# Patient Record
Sex: MALE | Race: WHITE | NOT HISPANIC OR LATINO | Employment: OTHER | URBAN - METROPOLITAN AREA
[De-identification: names, ages, dates, MRNs, and addresses within clinical notes are randomized per-mention and may not be internally consistent; named-entity substitution may affect disease eponyms.]

---

## 2018-01-17 ENCOUNTER — OFFICE VISIT CONVERTED (OUTPATIENT)
Dept: UROLOGY | Facility: CLINIC | Age: 58
End: 2018-01-17
Attending: UROLOGY

## 2019-01-23 ENCOUNTER — HOSPITAL ENCOUNTER (OUTPATIENT)
Dept: LAB | Facility: HOSPITAL | Age: 59
Discharge: HOME | End: 2019-01-23
Attending: UROLOGY

## 2019-01-23 ENCOUNTER — OFFICE VISIT CONVERTED (OUTPATIENT)
Dept: UROLOGY | Facility: CLINIC | Age: 59
End: 2019-01-23
Attending: UROLOGY

## 2019-01-23 LAB
BASOPHILS # BLD AUTO: 0.08 10*3/UL (ref 0–0.2)
BASOPHILS NFR BLD AUTO: 0.93 % (ref 0–3)
EOSINOPHIL # BLD AUTO: 0.14 10*3/UL (ref 0–0.7)
EOSINOPHIL # BLD AUTO: 1.64 % (ref 0–7)
ERYTHROCYTE [DISTWIDTH] IN BLOOD BY AUTOMATED COUNT: 12.9 % (ref 11.5–14.5)
HBA1C MFR BLD: 16.5 G/DL (ref 14–18)
HCT VFR BLD AUTO: 51.6 % (ref 42–52)
LYMPHOCYTES # BLD AUTO: 1.51 10*3/UL (ref 1–5)
MCH RBC QN AUTO: 28.9 PG (ref 27–31)
MCHC RBC AUTO-ENTMCNC: 31.9 G/DL (ref 33–37)
MCV RBC AUTO: 90.8 FL (ref 80–96)
MONOCYTES # BLD AUTO: 0.61 10*3/UL (ref 0.2–1.2)
MONOCYTES NFR BLD AUTO: 7.39 % (ref 3–10)
NEUTROPHILS # BLD AUTO: 5.95 10*3/UL (ref 2–8)
NEUTROPHILS NFR BLD AUTO: 71.8 % (ref 30–85)
NRBC BLD AUTO-RTO: 0 % (ref 0–0.01)
PLATELET # BLD AUTO: 167 10*3/UL (ref 130–400)
PMV BLD AUTO: 8.7 FL (ref 7.4–10.4)
RBC # BLD AUTO: 5.69 10*6/UL (ref 4.7–6.1)
VARIANT LYMPHS NFR BLD MANUAL: 18.2 % (ref 20–45)
WBC # BLD AUTO: 8.29 10*3/UL (ref 4.8–10.8)

## 2019-01-30 ENCOUNTER — OFFICE VISIT CONVERTED (OUTPATIENT)
Dept: FAMILY MEDICINE CLINIC | Facility: CLINIC | Age: 59
End: 2019-01-30
Attending: FAMILY MEDICINE

## 2019-05-27 ENCOUNTER — HOSPITAL ENCOUNTER (OUTPATIENT)
Dept: URGENT CARE | Facility: CLINIC | Age: 59
Discharge: HOME | End: 2019-05-27
Attending: PHYSICIAN ASSISTANT

## 2019-05-29 LAB — BACTERIA SPEC AEROBE CULT: NORMAL

## 2019-06-13 ENCOUNTER — OFFICE VISIT CONVERTED (OUTPATIENT)
Dept: FAMILY MEDICINE CLINIC | Facility: CLINIC | Age: 59
End: 2019-06-13
Attending: FAMILY MEDICINE

## 2019-11-22 ENCOUNTER — HOSPITAL ENCOUNTER (OUTPATIENT)
Dept: URGENT CARE | Facility: CLINIC | Age: 59
Discharge: HOME | End: 2019-11-22
Attending: EMERGENCY MEDICINE

## 2020-01-24 ENCOUNTER — HOSPITAL ENCOUNTER (OUTPATIENT)
Dept: LAB | Facility: HOSPITAL | Age: 60
Discharge: HOME | End: 2020-01-24
Attending: UROLOGY

## 2020-01-24 LAB
ALBUMIN SERPL-MCNC: 4.7 G/DL (ref 3.5–5)
ALBUMIN/GLOB SERPL: 1.7 {RATIO} (ref 1.4–2.6)
ALP SERPL-CCNC: 59 U/L (ref 56–155)
ALT SERPL-CCNC: 45 U/L (ref 10–40)
ANION GAP SERPL CALC-SCNC: 23 MMOL/L (ref 8–19)
AST SERPL-CCNC: 21 U/L (ref 15–50)
BASOPHILS # BLD AUTO: 0.05 10*3/UL (ref 0–0.2)
BASOPHILS NFR BLD AUTO: 0.7 % (ref 0–3)
BILIRUB SERPL-MCNC: 0.52 MG/DL (ref 0.2–1.3)
BUN SERPL-MCNC: 12 MG/DL (ref 5–25)
BUN/CREAT SERPL: 11 {RATIO} (ref 6–20)
CALCIUM SERPL-MCNC: 9.4 MG/DL (ref 8.7–10.4)
CHLORIDE SERPL-SCNC: 100 MMOL/L (ref 99–111)
CONV ABS IMM GRAN: 0.03 10*3/UL (ref 0–0.2)
CONV CO2: 22 MMOL/L (ref 22–32)
CONV IMMATURE GRAN: 0.4 % (ref 0–1.8)
CONV TOTAL PROTEIN: 7.5 G/DL (ref 6.3–8.2)
CREAT UR-MCNC: 1.05 MG/DL (ref 0.7–1.2)
DEPRECATED RDW RBC AUTO: 44.5 FL (ref 35.1–43.9)
EOSINOPHIL # BLD AUTO: 0.15 10*3/UL (ref 0–0.7)
EOSINOPHIL # BLD AUTO: 2.1 % (ref 0–7)
ERYTHROCYTE [DISTWIDTH] IN BLOOD BY AUTOMATED COUNT: 13.5 % (ref 11.6–14.4)
GFR SERPLBLD BASED ON 1.73 SQ M-ARVRAT: >60 ML/MIN/{1.73_M2}
GLOBULIN UR ELPH-MCNC: 2.8 G/DL (ref 2–3.5)
GLUCOSE SERPL-MCNC: 245 MG/DL (ref 70–99)
HCT VFR BLD AUTO: 49 % (ref 42–52)
HGB BLD-MCNC: 15.7 G/DL (ref 14–18)
LYMPHOCYTES # BLD AUTO: 1.75 10*3/UL (ref 1–5)
LYMPHOCYTES NFR BLD AUTO: 24.6 % (ref 20–45)
MCH RBC QN AUTO: 29.1 PG (ref 27–31)
MCHC RBC AUTO-ENTMCNC: 32 G/DL (ref 33–37)
MCV RBC AUTO: 90.9 FL (ref 80–96)
MONOCYTES # BLD AUTO: 0.63 10*3/UL (ref 0.2–1.2)
MONOCYTES NFR BLD AUTO: 8.8 % (ref 3–10)
NEUTROPHILS # BLD AUTO: 4.51 10*3/UL (ref 2–8)
NEUTROPHILS NFR BLD AUTO: 63.4 % (ref 30–85)
NRBC CBCN: 0 % (ref 0–0.7)
OSMOLALITY SERPL CALC.SUM OF ELEC: 298 MOSM/KG (ref 273–304)
PLATELET # BLD AUTO: 170 10*3/UL (ref 130–400)
PMV BLD AUTO: 11.3 FL (ref 9.4–12.4)
POTASSIUM SERPL-SCNC: 4.6 MMOL/L (ref 3.5–5.3)
PSA SERPL-MCNC: 0.41 NG/ML (ref 0–4)
RBC # BLD AUTO: 5.39 10*6/UL (ref 4.7–6.1)
SODIUM SERPL-SCNC: 140 MMOL/L (ref 135–147)
TESTOST SERPL-MCNC: 400 NG/DL (ref 193–740)
WBC # BLD AUTO: 7.12 10*3/UL (ref 4.8–10.8)

## 2020-01-29 ENCOUNTER — OFFICE VISIT CONVERTED (OUTPATIENT)
Dept: UROLOGY | Facility: CLINIC | Age: 60
End: 2020-01-29
Attending: UROLOGY

## 2020-01-29 ENCOUNTER — HOSPITAL ENCOUNTER (OUTPATIENT)
Dept: LAB | Facility: HOSPITAL | Age: 60
Discharge: HOME | End: 2020-01-29
Attending: UROLOGY

## 2020-01-29 LAB
AMPHETAMINES UR QL SCN: NEGATIVE
BARBITURATES UR QL SCN: NEGATIVE
BENZODIAZ UR QL SCN: NEGATIVE
CONV COCAINE, UR: NEGATIVE
METHADONE UR QL SCN: NEGATIVE
OPIATES TESTED UR SCN: NEGATIVE
OXYCODONE UR QL SCN: NEGATIVE
PCP UR QL: NEGATIVE
THC SERPLBLD CFM-MCNC: NEGATIVE NG/ML

## 2020-02-27 ENCOUNTER — CONVERSION ENCOUNTER (OUTPATIENT)
Dept: FAMILY MEDICINE CLINIC | Facility: CLINIC | Age: 60
End: 2020-02-27

## 2020-02-27 ENCOUNTER — OFFICE VISIT CONVERTED (OUTPATIENT)
Dept: FAMILY MEDICINE CLINIC | Facility: CLINIC | Age: 60
End: 2020-02-27
Attending: FAMILY MEDICINE

## 2020-04-24 ENCOUNTER — HOSPITAL ENCOUNTER (OUTPATIENT)
Dept: LAB | Facility: HOSPITAL | Age: 60
Discharge: HOME | End: 2020-04-24
Attending: INTERNAL MEDICINE

## 2020-04-24 LAB
ANION GAP SERPL CALC-SCNC: 24 MMOL/L (ref 8–19)
BUN SERPL-MCNC: 17 MG/DL (ref 5–25)
BUN/CREAT SERPL: 18 {RATIO} (ref 6–20)
CALCIUM SERPL-MCNC: 9.2 MG/DL (ref 8.7–10.4)
CHLORIDE SERPL-SCNC: 97 MMOL/L (ref 99–111)
CONV CO2: 19 MMOL/L (ref 22–32)
CREAT UR-MCNC: 0.96 MG/DL (ref 0.7–1.2)
GFR SERPLBLD BASED ON 1.73 SQ M-ARVRAT: >60 ML/MIN/{1.73_M2}
GLUCOSE SERPL-MCNC: 216 MG/DL (ref 70–99)
OSMOLALITY SERPL CALC.SUM OF ELEC: 288 MOSM/KG (ref 273–304)
POTASSIUM SERPL-SCNC: 4.5 MMOL/L (ref 3.5–5.3)
SODIUM SERPL-SCNC: 135 MMOL/L (ref 135–147)

## 2020-07-04 ENCOUNTER — HOSPITAL ENCOUNTER (OUTPATIENT)
Dept: URGENT CARE | Facility: CLINIC | Age: 60
Discharge: HOME | End: 2020-07-04
Attending: PHYSICIAN ASSISTANT

## 2020-07-08 LAB — SARS-COV-2 RNA SPEC QL NAA+PROBE: NOT DETECTED

## 2020-08-12 ENCOUNTER — HOSPITAL ENCOUNTER (OUTPATIENT)
Dept: URGENT CARE | Facility: CLINIC | Age: 60
Discharge: HOME | End: 2020-08-12
Attending: NURSE PRACTITIONER

## 2020-11-09 ENCOUNTER — HOSPITAL ENCOUNTER (OUTPATIENT)
Dept: LAB | Facility: HOSPITAL | Age: 60
Discharge: HOME | End: 2020-11-09
Attending: INTERNAL MEDICINE

## 2020-11-09 LAB
BASOPHILS # BLD AUTO: 0.07 10*3/UL (ref 0–0.2)
BASOPHILS NFR BLD AUTO: 0.8 % (ref 0–3)
CONV ABS IMM GRAN: 0.07 10*3/UL (ref 0–0.2)
CONV IMMATURE GRAN: 0.8 % (ref 0–1.8)
DEPRECATED RDW RBC AUTO: 44.6 FL (ref 35.1–43.9)
EOSINOPHIL # BLD AUTO: 0.17 10*3/UL (ref 0–0.7)
EOSINOPHIL # BLD AUTO: 1.8 % (ref 0–7)
ERYTHROCYTE [DISTWIDTH] IN BLOOD BY AUTOMATED COUNT: 13.8 % (ref 11.6–14.4)
HCT VFR BLD AUTO: 49.9 % (ref 42–52)
HGB BLD-MCNC: 16.3 G/DL (ref 14–18)
LYMPHOCYTES # BLD AUTO: 1.37 10*3/UL (ref 1–5)
LYMPHOCYTES NFR BLD AUTO: 14.9 % (ref 20–45)
MCH RBC QN AUTO: 28.9 PG (ref 27–31)
MCHC RBC AUTO-ENTMCNC: 32.7 G/DL (ref 33–37)
MCV RBC AUTO: 88.5 FL (ref 80–96)
MONOCYTES # BLD AUTO: 0.69 10*3/UL (ref 0.2–1.2)
MONOCYTES NFR BLD AUTO: 7.5 % (ref 3–10)
NEUTROPHILS # BLD AUTO: 6.84 10*3/UL (ref 2–8)
NEUTROPHILS NFR BLD AUTO: 74.2 % (ref 30–85)
NRBC CBCN: 0 % (ref 0–0.7)
PLATELET # BLD AUTO: 186 10*3/UL (ref 130–400)
PMV BLD AUTO: 11 FL (ref 9.4–12.4)
RBC # BLD AUTO: 5.64 10*6/UL (ref 4.7–6.1)
WBC # BLD AUTO: 9.21 10*3/UL (ref 4.8–10.8)

## 2021-01-19 ENCOUNTER — OFFICE VISIT CONVERTED (OUTPATIENT)
Dept: NEUROLOGY | Facility: CLINIC | Age: 61
End: 2021-01-19
Attending: PSYCHIATRY & NEUROLOGY

## 2021-01-19 ENCOUNTER — CONVERSION ENCOUNTER (OUTPATIENT)
Dept: OTHER | Facility: HOSPITAL | Age: 61
End: 2021-01-19

## 2021-01-21 ENCOUNTER — HOSPITAL ENCOUNTER (OUTPATIENT)
Dept: LAB | Facility: HOSPITAL | Age: 61
Discharge: HOME | End: 2021-01-21
Attending: UROLOGY

## 2021-01-21 LAB
ALBUMIN SERPL-MCNC: 4.6 G/DL (ref 3.5–5)
ALBUMIN/GLOB SERPL: 1.5 {RATIO} (ref 1.4–2.6)
ALP SERPL-CCNC: 65 U/L (ref 56–155)
ALT SERPL-CCNC: 37 U/L (ref 10–40)
AMPHETAMINES UR QL SCN: NEGATIVE
ANION GAP SERPL CALC-SCNC: 23 MMOL/L (ref 8–19)
AST SERPL-CCNC: 19 U/L (ref 15–50)
BARBITURATES UR QL SCN: NEGATIVE
BASOPHILS # BLD AUTO: 0.07 10*3/UL (ref 0–0.2)
BASOPHILS NFR BLD AUTO: 0.9 % (ref 0–3)
BENZODIAZ UR QL SCN: NEGATIVE
BILIRUB SERPL-MCNC: 0.57 MG/DL (ref 0.2–1.3)
BUN SERPL-MCNC: 17 MG/DL (ref 5–25)
BUN/CREAT SERPL: 15 {RATIO} (ref 6–20)
CALCIUM SERPL-MCNC: 9.7 MG/DL (ref 8.7–10.4)
CHLORIDE SERPL-SCNC: 99 MMOL/L (ref 99–111)
CONV ABS IMM GRAN: 0.05 10*3/UL (ref 0–0.2)
CONV CO2: 22 MMOL/L (ref 22–32)
CONV COCAINE, UR: NEGATIVE
CONV IMMATURE GRAN: 0.6 % (ref 0–1.8)
CONV TOTAL PROTEIN: 7.7 G/DL (ref 6.3–8.2)
CREAT UR-MCNC: 1.12 MG/DL (ref 0.7–1.2)
DEPRECATED RDW RBC AUTO: 45.1 FL (ref 35.1–43.9)
EOSINOPHIL # BLD AUTO: 0.2 10*3/UL (ref 0–0.7)
EOSINOPHIL # BLD AUTO: 2.5 % (ref 0–7)
ERYTHROCYTE [DISTWIDTH] IN BLOOD BY AUTOMATED COUNT: 13.8 % (ref 11.6–14.4)
GFR SERPLBLD BASED ON 1.73 SQ M-ARVRAT: >60 ML/MIN/{1.73_M2}
GLOBULIN UR ELPH-MCNC: 3.1 G/DL (ref 2–3.5)
GLUCOSE SERPL-MCNC: 211 MG/DL (ref 70–99)
HCT VFR BLD AUTO: 51.6 % (ref 42–52)
HGB BLD-MCNC: 16.8 G/DL (ref 14–18)
LYMPHOCYTES # BLD AUTO: 1.67 10*3/UL (ref 1–5)
LYMPHOCYTES NFR BLD AUTO: 21.1 % (ref 20–45)
MCH RBC QN AUTO: 29.3 PG (ref 27–31)
MCHC RBC AUTO-ENTMCNC: 32.6 G/DL (ref 33–37)
MCV RBC AUTO: 90.1 FL (ref 80–96)
METHADONE UR QL SCN: NEGATIVE
MONOCYTES # BLD AUTO: 0.83 10*3/UL (ref 0.2–1.2)
MONOCYTES NFR BLD AUTO: 10.5 % (ref 3–10)
NEUTROPHILS # BLD AUTO: 5.09 10*3/UL (ref 2–8)
NEUTROPHILS NFR BLD AUTO: 64.4 % (ref 30–85)
NRBC CBCN: 0 % (ref 0–0.7)
OPIATES TESTED UR SCN: NEGATIVE
OSMOLALITY SERPL CALC.SUM OF ELEC: 296 MOSM/KG (ref 273–304)
OXYCODONE UR QL SCN: NEGATIVE
PCP UR QL: NEGATIVE
PLATELET # BLD AUTO: 193 10*3/UL (ref 130–400)
PMV BLD AUTO: 11.4 FL (ref 9.4–12.4)
POTASSIUM SERPL-SCNC: 4.5 MMOL/L (ref 3.5–5.3)
PSA SERPL-MCNC: 0.44 NG/ML (ref 0–4)
RBC # BLD AUTO: 5.73 10*6/UL (ref 4.7–6.1)
SODIUM SERPL-SCNC: 139 MMOL/L (ref 135–147)
TESTOST SERPL-MCNC: 251 NG/DL (ref 193–740)
THC SERPLBLD CFM-MCNC: NEGATIVE NG/ML
WBC # BLD AUTO: 7.91 10*3/UL (ref 4.8–10.8)

## 2021-01-25 ENCOUNTER — OFFICE VISIT CONVERTED (OUTPATIENT)
Dept: ORTHOPEDIC SURGERY | Facility: CLINIC | Age: 61
End: 2021-01-25
Attending: ORTHOPAEDIC SURGERY

## 2021-01-25 ENCOUNTER — CONVERSION ENCOUNTER (OUTPATIENT)
Dept: ORTHOPEDIC SURGERY | Facility: CLINIC | Age: 61
End: 2021-01-25

## 2021-02-02 ENCOUNTER — TELEPHONE CONVERTED (OUTPATIENT)
Dept: UROLOGY | Facility: CLINIC | Age: 61
End: 2021-02-02
Attending: UROLOGY

## 2021-03-01 ENCOUNTER — HOSPITAL ENCOUNTER (OUTPATIENT)
Dept: LAB | Facility: HOSPITAL | Age: 61
Discharge: HOME | End: 2021-03-01
Attending: SPECIALIST

## 2021-03-01 LAB — TSH SERPL-ACNC: 1.63 M[IU]/L (ref 0.27–4.2)

## 2021-03-10 ENCOUNTER — OFFICE VISIT CONVERTED (OUTPATIENT)
Dept: ORTHOPEDIC SURGERY | Facility: CLINIC | Age: 61
End: 2021-03-10
Attending: ORTHOPAEDIC SURGERY

## 2021-04-14 ENCOUNTER — OFFICE VISIT CONVERTED (OUTPATIENT)
Dept: ORTHOPEDIC SURGERY | Facility: CLINIC | Age: 61
End: 2021-04-14
Attending: ORTHOPAEDIC SURGERY

## 2021-05-10 NOTE — H&P
History and Physical      Patient Name: Osman Holman   Patient ID: [id number]   Sex: Male   YOB: 1960    Primary Care Provider: Carlos A Gibson III MD   Referring Provider: Jo Ann Darby MD    Visit Date: January 25, 2021    Provider: Ab Copeland MD   Location: [facility]   Location Address: [address] [place] [address]  [id number]   Location Phone: [phone]          Chief Complaint  · Bilateral hand pain       History Of Present Illness  Osman Holman is a 61 year old /White male who presents today to [facility].      Patient presents today for an evaluation of bilateral hand pain. Patient complains of numbness and tingling for the last 3 months. He states that the left is worse than the right. He states with certain movements he has pain that shoots up to his elbow. Patient didn't need carpal tunnel braces at first but now he has to sleep with them every night.       Past Medical History  Allergic rhinitis; Aortic stenosis; Arthritis; Diabetes; Diabetes mellitus type 2 with complications, uncontrolled; ED (erectile dysfunction); Essential hypertension; GERD (gastroesophageal reflux disease); Heart Murmur; Hyperlipemia; Hyperlipidemia; Hypertension; Hypogonadism in male; Hypothyroidism; Nose irritation; Nostril sore; Thyroid disorder; Vitamin D Deficiency         Past Surgical History  Cholecstectomy; Colonoscopy; Gallbladder         Medication List  amlodipine 5 mg oral tablet; cetirizine 10 mg oral tablet; cyclobenzaprine 10 mg oral tablet; Flonase Sensimist 27.5 mcg/actuation nasal spray,suspension; glipizide 10 mg oral tablet; Janumet XR 50-1,000 mg oral tablet, ER multiphase 24 hr; levothyroxine 50 mcg oral tablet; omeprazole 20 mg oral capsule,delayed release(DR/EC); sildenafil 100 mg oral tablet; testosterone cypionate 200 mg/mL intramuscular oil         Allergy List  PENICILLINS       Allergies Reconciled  Family Medical History  Stroke; Diabetes,  "unspecified type; Hypertension; Diabetes; Family history of Arthritis         Social History  Alcohol (Light); Alcohol Use (Current some day); Claustophobic (Unknown); lives with spouse; .; Recreational Drug Use (Never); Retired.; Tobacco (Former)         Review of Systems  · Constitutional  o Denies  o : fever, chills, weight loss  · Cardiovascular  o Denies  o : chest pain, shortness of breath  · Gastrointestinal  o Denies  o : liver disease, heartburn, nausea, blood in stools  · Genitourinary  o Denies  o : painful urination, blood in urine  · Integument  o Denies  o : rash, itching  · Neurologic  o Denies  o : headache, weakness, loss of consciousness  · Musculoskeletal  o Denies  o : painful, swollen joints  · Psychiatric  o Denies  o : drug/alcohol addiction, anxiety, depression      Vitals  Date Time BP Position Site L\R Cuff Size HR RR TEMP (F) WT  HT  BMI kg/m2 BSA m2 O2 Sat FR L/min FiO2        01/25/2021 04:06 PM      97 - R   204lbs 2oz 5'  6\" 32.95 2.08 97 %            Physical Examination  · Constitutional  o Appearance  o : well developed, well-nourished, no obvious deformities present  · Head and Face  o Head  o :   § Inspection  § : normocephalic  o Face  o :   § Inspection  § : no facial lesions  · Eyes  o Conjunctivae  o : conjunctivae normal  o Sclerae  o : sclerae white  · Ears, Nose, Mouth and Throat  o Ears  o :   § External Ears  § : appearance within normal limits  § Hearing  § : intact  o Nose  o :   § External Nose  § : appearance normal  · Neck  o Inspection/Palpation  o : normal appearance  o Range of Motion  o : full range of motion  · Respiratory  o Respiratory Effort  o : breathing unlabored  o Inspection of Chest  o : normal appearance  o Auscultation of Lungs  o : no audible wheezing or rales  · Cardiovascular  o Heart  o : regular rate  · Gastrointestinal  o Abdominal Examination  o : soft and non-tender  · Skin and Subcutaneous Tissue  o General Inspection  o : intact, no " rashes  · Psychiatric  o General  o : Alert and oriented x3  o Judgement and Insight  o : judgment and insight intact  o Mood and Affect  o : mood normal, affect appropriate  · Extremities  o Extremities  o : BILATERAL WRISTS: Sensation grossly intact. Neurovascular intact. Skin intact. No swelling, skin discoloration or atrophy. Positive Tinels. Patient able to wiggle fingers and make a fist. Full wrist extension, full wrist flexion, full , full thumb opposition, full PIP flexors, full DIP flexors, full PIP extensors, full finger adduction, full finger abduction. Radial pulse 2+, ulnar pulse 2+.   · Injection Note/Aspiration Note  o Site  o : Bilateral, Wrist  o Procedure  o : Procedure: After educating the patient, patient gave consent for procedure. After using Chloraprep, the joint space was injected. The patient tolerated the procedure well.   o Medication  o : 80 mg of DepoMedrol with 1cc of 1% Lidocaine  · Imaging  o Imaging  o : 1/19/21 EMG: Bilateral carpal tunnel syndrome. Mild to moderate on the left and mild on the right.           Assessment  · Bilateral carpal tunnel syndrome     354.0/G56.03  · Arthralgia of both hands       Pain in joints of right hand     719.44/M25.541  Pain in joints of left hand     719.44/M25.542      Plan  · Orders  o 2 - Depo-Medrol injection 80mg () - - 01/25/2021   Lot [id number] Exp 01 2022 [employer] Administered by ALEX Copeland MD  o 2 - Carpal Tunnel (Injection) (46077) - - 01/25/2021   Lot [id number] Exp 03 01 2022 [employer] Administered by ALEX Copeland MD  · Medications  o Medications have been Reconciled  o Transition of Care or Provider Policy  · Instructions  o Dr. Copeland saw and examined the patient and agrees with plan.   o EMG reviewed by Dr. Copeland.  o Reviewed the patient's Past Medical, Social, and Family history as well as the ROS at today's visit, no changes.  o Call or return if worsening symptoms.  o Follow up in 6 weeks.  o This note was transcribed by  Mary Emerson. gabe  o Discussed diagnosis and treatment options with the patient. Patient opted for bilateral carpal tunnel injections.            Electronically Signed by: Mary Emerson-, Other -Author on January 28, 2021 10:38:32 AM  Electronically Co-signed by: Ab Copeland MD -Reviewer on January 28, 2021 10:18:52 PM

## 2021-05-14 VITALS
HEART RATE: 75 BPM | HEIGHT: 66 IN | TEMPERATURE: 97.4 F | BODY MASS INDEX: 32.47 KG/M2 | RESPIRATION RATE: 18 BRPM | WEIGHT: 202 LBS | SYSTOLIC BLOOD PRESSURE: 140 MMHG | DIASTOLIC BLOOD PRESSURE: 72 MMHG

## 2021-05-14 VITALS — OXYGEN SATURATION: 96 % | HEART RATE: 90 BPM | HEIGHT: 66 IN | WEIGHT: 211 LBS | BODY MASS INDEX: 33.91 KG/M2

## 2021-05-14 VITALS — WEIGHT: 204.12 LBS | BODY MASS INDEX: 32.81 KG/M2 | HEIGHT: 66 IN | HEART RATE: 97 BPM | OXYGEN SATURATION: 97 %

## 2021-05-14 VITALS — BODY MASS INDEX: 33.16 KG/M2 | WEIGHT: 206.31 LBS | HEIGHT: 66 IN

## 2021-05-14 NOTE — PROGRESS NOTES
Progress Note      Patient Name: Osman Holman   Patient ID: [id number]   Sex: Male   YOB: 1960    Primary Care Provider: Carlos A Gibson III MD   Referring Provider: Jo Ann Darby MD    Visit Date: March 10, 2021    Provider: Ab Copeland MD   Location: [facility]   Location Address: [address] [place] [address]  [id number]   Location Phone: [phone]          Chief Complaint  · Bilateral hand pain      History Of Present Illness  Osman Holman is a 61 year old /White male who presents today to [facility].      Patient presents today for a follow-up of bilateral carpal tunnel syndrome. Patient complains of numbness and tingling for the last 3 months. He states that the left is worse than the right. He states with certain movements he has pain that shoots up to his elbow. Patient didn't need carpal tunnel braces at first but now he has to sleep with them every night. Patient received an injection on 1/25/21 that gave him some relief. He states that he is in-between his COVID vaccinations right now. He is currently taking care of his wife and wants to hold off on surgical intervention at this time. He states thanks to the injection he has better strength in his hands. He states he is still experiencing numbness and tingling in his hands but it is a bit more tolerable.      Patient complains of pain in his calf muscle in his right leg. He states he was leaning forward in his wife's car and felt a pop in his calf. He denies any swelling or bruising afterwards. He states that he has been resting his calf muscle and it has improved some. He states stretching his calf gives him some relief.          Past Medical History  Allergic rhinitis; Aortic stenosis; Arthritis; Diabetes; Diabetes mellitus type 2 with complications, uncontrolled; ED (erectile dysfunction); Essential hypertension; GERD (gastroesophageal reflux disease); Heart Murmur; Hyperlipemia;  "Hyperlipidemia; Hypertension; Hypogonadism in male; Hypothyroidism; Nose irritation; Nostril sore; Thyroid disorder; Vitamin D Deficiency         Past Surgical History  Cholecstectomy; Colonoscopy; Gallbladder         Medication List  amlodipine 5 mg oral tablet; cetirizine 10 mg oral tablet; cyclobenzaprine 10 mg oral tablet; Flonase Sensimist 27.5 mcg/actuation nasal spray,suspension; glipizide 10 mg oral tablet; Janumet XR 50-1,000 mg oral tablet, ER multiphase 24 hr; levothyroxine 50 mcg oral tablet; omeprazole 20 mg oral capsule,delayed release(DR/EC); sildenafil 100 mg oral tablet; testosterone cypionate 200 mg/mL intramuscular oil         Allergy List  PENICILLINS       Allergies Reconciled  Family Medical History  Stroke; Diabetes, unspecified type; Hypertension; Diabetes; Family history of Arthritis         Social History  Alcohol (Light); Alcohol Use; Claustophobic (Unknown); lives with spouse; .; Recreational Drug Use (Never); Retired.; Tobacco (Former)         Review of Systems  · Constitutional  o Denies  o : fever, chills, weight loss  · Cardiovascular  o Denies  o : chest pain, shortness of breath  · Gastrointestinal  o Denies  o : liver disease, heartburn, nausea, blood in stools  · Genitourinary  o Denies  o : painful urination, blood in urine  · Integument  o Denies  o : rash, itching  · Neurologic  o Denies  o : headache, weakness, loss of consciousness  · Musculoskeletal  o Denies  o : painful, swollen joints  · Psychiatric  o Denies  o : drug/alcohol addiction, anxiety, depression      Vitals  Date Time BP Position Site L\R Cuff Size HR RR TEMP (F) WT  HT  BMI kg/m2 BSA m2 O2 Sat FR L/min FiO2        03/10/2021 01:23 PM         206lbs 5oz 5'  6\" 33.3 2.09             Physical Examination  · Constitutional  o Appearance  o : well developed, well-nourished, no obvious deformities present  · Head and Face  o Head  o :   § Inspection  § : normocephalic  o Face  o :   § Inspection  § : no " facial lesions  · Eyes  o Conjunctivae  o : conjunctivae normal  o Sclerae  o : sclerae white  · Ears, Nose, Mouth and Throat  o Ears  o :   § External Ears  § : appearance within normal limits  § Hearing  § : intact  o Nose  o :   § External Nose  § : appearance normal  · Neck  o Inspection/Palpation  o : normal appearance  o Range of Motion  o : full range of motion  · Respiratory  o Respiratory Effort  o : breathing unlabored  o Inspection of Chest  o : normal appearance  o Auscultation of Lungs  o : no audible wheezing or rales  · Cardiovascular  o Heart  o : regular rate  · Gastrointestinal  o Abdominal Examination  o : soft and non-tender  · Skin and Subcutaneous Tissue  o General Inspection  o : intact, no rashes  · Psychiatric  o General  o : Alert and oriented x3  o Judgement and Insight  o : judgment and insight intact  o Mood and Affect  o : mood normal, affect appropriate  · Right Hand  o Inspection  o : No swelling, atrophy, and skin discoloration. Skin intact. Full ROM. Patient able to wiggle fingers and make a fist. Full wrist extension, full wrist flexion, full , full thumb opposition, full PIP flexors, full DIP flexors, full PIP extensors, full finger adduction, full finger abduction. Radial pulse 2+, ulnar pulse 2+. Positive median nerve compression.   · Left Hand  o Inspection  o : No swelling, atrophy, and skin discoloration. Skin intact. Full ROM. Patient able to wiggle fingers and make a fist. Full wrist extension, full wrist flexion, full , full thumb opposition, full PIP flexors, full DIP flexors, full PIP extensors, full finger adduction, full finger abduction. Radial pulse 2+, ulnar pulse 2+. Positive median nerve compression.   · Right Lower Extremity  o Inspection  o : Stable to valgus/varus stress. Good strength in quadriceps, hamstrings, dorsiflexors, and plantar flexors. Sensation grossly intact. Neurovascular intact. No swelling, skin discoloration or atrophy. Full weight  bearing. Non-antalgic gait. Calf supple, non-tender. Full flexion and extension. Negative ballotable effusion , negtive fluid wave, negative patellar compression, negative patellar apprehension, positive Eliel's test, positive Apley's test, negative anterior drawer, negative posterior drawer, negative lachman's drawer.   · Imaging  o Imaging  o : 1/19/21 EMG: Bilateral carpal tunnel syndrome. Mild to moderate on the left and mild on the right.          Assessment  · Bilateral carpal tunnel syndrome     354.0/G56.03  · Arthralgia of both hands       Pain in joints of right hand     719.44/M25.541  Pain in joints of left hand     719.44/M25.542  · Calf strain, right     729.5/M79.669      Plan  · Medications  o Medications have been Reconciled  o Transition of Care or Provider Policy  · Instructions  o Dr. Copeland saw and examined the patient and agrees with plan.   o EMG reviewed by Dr. Copeland.  o Reviewed the patient's Past Medical, Social, and Family history as well as the ROS at today's visit, no changes.  o Call or return if worsening symptoms.  o Exercise handout given.  o Follow Up PRN.  o The above service was scribed by Mary Emerson on my behalf and I attest to the accuracy of the note. gabe  o Discussed treatment plans with the patient. He states that he is in-between his COVID vaccinations right now. He is currently taking care of his wife and wants to hold off on surgical intervention at this time. He wishes to proceed with conservative management. Patient given some at home exercises for calf strains. Patient given a prescription for physical therapy if he wishes to attend.            Electronically Signed by: Mary Emerson-, Other -Author on March 11, 2021 11:18:21 AM  Electronically Co-signed by: Ab Copeland MD -Reviewer on March 14, 2021 05:40:41 PM

## 2021-05-14 NOTE — PROGRESS NOTES
"   Progress Note      Patient Name: Osman Holman   Patient ID: [id number]   Sex: Male   YOB: 1960    Primary Care Provider: Carlos A Gibson III MD   Referring Provider: Jo Ann Darby MD    Visit Date: February 2, 2021    Provider: Chuck Reese MD   Location: [facility]   Location Address: [address] [place] [address]  [id number]   Location Phone: [phone]          Chief Complaint  · urological issues      History Of Present Illness  TELEHEALTH TELEPHONE VISIT  Osman Holman is a 61 year old /White male who is presenting for evaluation via telehealth telephone visit. Verbal consent obtained before beginning visit.   Provider spent 12 minutes with the patient during the telehealth visit.   The following staff were present during this visit: CULLEN Frankel   Past Medical History/ Overview of Patient Symptoms     58, H/H 16/51    Total testosterone    1/21    251  1/20    400  11/18  393  11/17  504      Previous    Patient's main complaint initially was decreased energy and decreased libido.    No side effects or problems with his injections.    Voiding well. no gross hematuria,No history of kidney stone.    No urologic family history,   Has never had any urologic surgery.    No cardiopulmonary history.  Patient does not smoke.  Patient does not use blood thinner.    PSA    1/21     0.44  1/20     0.41  11/18   0.48  11/17   0.46  11/13   0.47\">61-year-old  gentleman who is on testosterone therapy for hypogonadism.    Does not want a NP for rectal exam.    Energy level and libido ok     Was on Depo testosterone testosterone injections 200 mg IM every 2 weeks.  Patient is followed by endocrinology secondary to his diabetes.  She found that his H&H was elevated and had him decrease his testosterone down to 175 mg (0.8 ml) every 2 weeks.  He has been doing okay on this and on repeat H/H he is back within normal limits.  No real changes to his fatigue or " libido.      He is doing ok on this new dose.    f/u Today with labs    1/21 creatinine 1.1, > 60, H/H 16/51    Total testosterone    1/21    251  1/20    400  11/18  393  11/17  504      Previous    Patient's main complaint initially was decreased energy and decreased libido.    No side effects or problems with his injections.    Voiding well. no gross hematuria,No history of kidney stone.    No urologic family history,   Has never had any urologic surgery.    No cardiopulmonary history.  Patient does not smoke.  Patient does not use blood thinner.    PSA    1/21     0.44  1/20     0.41  11/18   0.48  11/17   0.46  11/13   0.47       Past Medical History  Allergic rhinitis; Aortic stenosis; Arthritis; Diabetes; Diabetes mellitus type 2 with complications, uncontrolled; ED (erectile dysfunction); Essential hypertension; GERD (gastroesophageal reflux disease); Heart Murmur; Hyperlipemia; Hyperlipidemia; Hypertension; Hypogonadism in male; Hypothyroidism; Nose irritation; Nostril sore; Thyroid disorder; Vitamin D Deficiency         Past Surgical History  Cholecstectomy; Colonoscopy; Gallbladder         Medication List  amlodipine 5 mg oral tablet; cetirizine 10 mg oral tablet; cyclobenzaprine 10 mg oral tablet; Flonase Sensimist 27.5 mcg/actuation nasal spray,suspension; glipizide 10 mg oral tablet; Janumet XR 50-1,000 mg oral tablet, ER multiphase 24 hr; levothyroxine 50 mcg oral tablet; omeprazole 20 mg oral capsule,delayed release(DR/EC); sildenafil 100 mg oral tablet; testosterone cypionate 200 mg/mL intramuscular oil         Allergy List  PENICILLINS         Family Medical History  Stroke; Diabetes, unspecified type; Hypertension; Diabetes; Family history of Arthritis         Social History  Alcohol (Light); Alcohol Use; Claustophobic (Unknown); lives with spouse; .; Recreational Drug Use (Never); Retired.; Tobacco (Former)         Review of Systems  · Constitutional  o Denies  o : fatigue, night  sweats  · Eyes  o Denies  o : double vision, blurred vision  · HENT  o Denies  o : vertigo, recent head injury  · Breasts  o Denies  o : abnormal changes in breast size, additional breast symptoms except as noted in the HPI  · Cardiovascular  o Denies  o : chest pain, irregular heart beats  · Respiratory  o Denies  o : shortness of breath, productive cough  · Gastrointestinal  o Denies  o : nausea, vomiting  · Genitourinary  o Denies  o : dysuria, urinary retention  · Integument  o Denies  o : hair growth change, new skin lesions  · Neurologic  o Denies  o : altered mental status, seizures  · Musculoskeletal  o Denies  o : joint swelling, limitation of motion  · Endocrine  o Denies  o : cold intolerance, heat intolerance  · Heme-Lymph  o Denies  o : petechiae, lymph node enlargement or tenderness  · Allergic-Immunologic  o Denies  o : frequent illnesses              Assessment  · Hypogonadism     257.2  · Prostate cancer screening     V76.44/Z12.5  · Therapeutic drug monitoring     V58.83/Z51.81      Plan  · Orders  o CBC with Auto Diff [facility] (11033) - 257.2, V58.83/Z51.81 - 08/02/2021  o CMP [facility] (31190) - 257.2, V58.83/Z51.81 - 08/02/2021  o Physician Telephone Evaluation, 11-20 minutes (29548) - 257.2, V76.44/Z12.5, V58.83/Z51.81 - 02/02/2021  o Total testosterone (61108) - 257.2, V58.83/Z51.81 - 08/02/2021  o Urine Drug Screen ([facility]) (30385) - 257.2, V58.83/Z51.81 - 08/02/2021  · Medications  o Medications have been Reconciled  o Transition of Care or Provider Policy  · Instructions  o Plan Of Care:   o Electronically Identified Patient Education Materials Provided Electronically       Patient will continue Depo-Testosterone 175 mg every 2 weeks IM at home.    Patient is using 1 mL vials and wasting the rest    Follow-up in 6 months with a CMP, CBC and total testosterone             Electronically Signed by: Chuck Reese MD -Author on February 2, 2021 09:04:10 AM

## 2021-05-14 NOTE — PROGRESS NOTES
Progress Note      Patient Name: Osman Holman   Patient ID: [id number]   Sex: Male   YOB: 1960    Primary Care Provider: Carlos A Gibson III MD   Referring Provider: Jo Ann Darby MD    Visit Date: April 14, 2021    Provider: Ab Copeland MD   Location: [facility]   Location Address: [address] [place] [address]  [id number]   Location Phone: [phone]          Chief Complaint  · Bilateral Carpal Tunnel Syndrome      History Of Present Illness  Osman Holman is a 61 year old /White male who presents today to [facility].      Patient presents today for a follow-up of bilateral carpal tunnel syndrome. Patient complains of numbness and tingling for the last 3 months. He states that the left is worse than the right. He states with certain movements he has pain that shoots up to his elbow. Patient didn't need carpal tunnel braces at first but now he has to sleep with them every night. Patient received an injection on 1/25/21 that gave him some relief. He is currently taking care of his wife and wants to hold off on surgical intervention at this time. He states thanks to the injection he has better strength in his hands. He states he is still experiencing numbness and tingling in his hands but it is a bit more tolerable. Patient presents today for bilateral carpal tunnel injections.       Past Medical History  Allergic rhinitis; Aortic stenosis; Arthritis; Diabetes; Diabetes mellitus type 2 with complications, uncontrolled; ED (erectile dysfunction); Essential hypertension; GERD (gastroesophageal reflux disease); Heart Murmur; Hyperlipemia; Hyperlipidemia; Hypertension; Hypogonadism in male; Hypothyroidism; Nose irritation; Nostril sore; Thyroid disorder; Vitamin D Deficiency         Past Surgical History  Cholecstectomy; Colonoscopy; Gallbladder         Medication List  amlodipine 5 mg oral tablet; cetirizine 10 mg oral tablet; cyclobenzaprine 10 mg oral tablet;  "Flonase Sensimist 27.5 mcg/actuation nasal spray,suspension; glipizide 10 mg oral tablet; Janumet XR 50-1,000 mg oral tablet, ER multiphase 24 hr; levothyroxine 50 mcg oral tablet; omeprazole 20 mg oral capsule,delayed release(DR/EC); sildenafil 100 mg oral tablet; testosterone cypionate 200 mg/mL intramuscular oil         Allergy List  PENICILLINS       Allergies Reconciled  Family Medical History  Stroke; Diabetes, unspecified type; Hypertension; Diabetes; Family history of Arthritis         Social History  Alcohol (Light); Alcohol Use; Claustophobic (Unknown); lives with spouse; .; Recreational Drug Use (Never); Retired.; Tobacco (Former)         Review of Systems  · Constitutional  o Denies  o : fever, chills, weight loss  · Cardiovascular  o Denies  o : chest pain, shortness of breath  · Gastrointestinal  o Denies  o : liver disease, heartburn, nausea, blood in stools  · Genitourinary  o Denies  o : painful urination, blood in urine  · Integument  o Denies  o : rash, itching  · Neurologic  o Denies  o : headache, weakness, loss of consciousness  · Musculoskeletal  o Denies  o : painful, swollen joints  · Psychiatric  o Denies  o : drug/alcohol addiction, anxiety, depression      Vitals  Date Time BP Position Site L\R Cuff Size HR RR TEMP (F) WT  HT  BMI kg/m2 BSA m2 O2 Sat FR L/min FiO2        04/14/2021 10:27 AM      90 - R   211lbs 0oz 5'  6\" 34.06 2.11 96 %            Physical Examination  · Constitutional  o Appearance  o : well developed, well-nourished, no obvious deformities present  · Head and Face  o Head  o :   § Inspection  § : normocephalic  o Face  o :   § Inspection  § : no facial lesions  · Eyes  o Conjunctivae  o : conjunctivae normal  o Sclerae  o : sclerae white  · Ears, Nose, Mouth and Throat  o Ears  o :   § External Ears  § : appearance within normal limits  § Hearing  § : intact  o Nose  o :   § External Nose  § : appearance normal  · Neck  o Inspection/Palpation  o : normal " appearance  o Range of Motion  o : full range of motion  · Respiratory  o Respiratory Effort  o : breathing unlabored  o Inspection of Chest  o : normal appearance  o Auscultation of Lungs  o : no audible wheezing or rales  · Cardiovascular  o Heart  o : regular rate  · Gastrointestinal  o Abdominal Examination  o : soft and non-tender  · Skin and Subcutaneous Tissue  o General Inspection  o : intact, no rashes  · Psychiatric  o General  o : Alert and oriented x3  o Judgement and Insight  o : judgment and insight intact  o Mood and Affect  o : mood normal, affect appropriate  · Extremities  o Extremities  o : BILATERAL HANDS: Neurovascular intact. Sensation grossly intact. No swelling, atrophy, and skin discoloration. Skin intact. Full ROM. Patient able to wiggle fingers and make a fist. Full wrist extension, full wrist flexion, full , full thumb opposition, full PIP flexors, full DIP flexors, full PIP extensors, full finger adduction, full finger abduction. Radial pulse 2+, ulnar pulse 2+. Positive Phalen's. Positive median nerve compression. Positive Tinel's.   · Injection Note/Aspiration Note  o Site  o : Bilateral Carpal Tunnel  o Procedure  o : Procedure: After educating the patient, patient gave consent for procedure. After using Chloraprep, the joint space was injected. The patient tolerated the procedure well.   o Medication  o : 80 mg of DepoMedrol with 1cc of 1% Lidocaine          Assessment  · Bilateral carpal tunnel syndrome     354.0/G56.03      Plan  · Orders  o 2 - Carpal Tunnel (Injection) (41781) - - 04/14/2021   Lot [id number] Exp 04 2024 [employer] Administered by ALEX Copeland MD  o 2 - Depo-Medrol 80 () - - 04/14/2021   Lot [id number] Exp 02 2022 [employer] Administered by ALEX Copeland MD  · Medications  o Medications have been Reconciled  o Transition of Care or Provider Policy  · Instructions  o Dr. Copeland saw and examined the patient and agrees with plan.   o Reviewed the patient's Past  Medical, Social, and Family history as well as the ROS at today's visit, no changes.  o Call or return if worsening symptoms.  o Follow Up PRN.  o Discussed treatment plans with the patient. Patient received bilateral carpal tunnel injections and tolerated this procedure well.   o The above service was scribed by Mary Emerson on my behalf and I attest to the accuracy of the note. [email or web address]            Electronically Signed by: Mary Emerson-, Other -Author on April 15, 2021 01:51:11 PM  Electronically Co-signed by: Ab Copeland MD -Reviewer on April 18, 2021 07:40:33 PM

## 2021-05-15 VITALS
SYSTOLIC BLOOD PRESSURE: 140 MMHG | DIASTOLIC BLOOD PRESSURE: 74 MMHG | HEIGHT: 66 IN | BODY MASS INDEX: 32.78 KG/M2 | WEIGHT: 204 LBS

## 2021-05-15 VITALS
SYSTOLIC BLOOD PRESSURE: 144 MMHG | DIASTOLIC BLOOD PRESSURE: 82 MMHG | WEIGHT: 206 LBS | BODY MASS INDEX: 33.11 KG/M2 | HEIGHT: 66 IN

## 2021-05-16 VITALS
HEART RATE: 97 BPM | TEMPERATURE: 99.6 F | DIASTOLIC BLOOD PRESSURE: 70 MMHG | HEIGHT: 66 IN | WEIGHT: 209 LBS | SYSTOLIC BLOOD PRESSURE: 140 MMHG | BODY MASS INDEX: 33.59 KG/M2 | OXYGEN SATURATION: 96 %

## 2021-05-16 VITALS — BODY MASS INDEX: 33.27 KG/M2 | RESPIRATION RATE: 12 BRPM | HEIGHT: 66 IN | WEIGHT: 207 LBS

## 2021-05-16 VITALS — WEIGHT: 207 LBS | HEIGHT: 66 IN | RESPIRATION RATE: 14 BRPM | BODY MASS INDEX: 33.27 KG/M2

## 2021-07-12 ENCOUNTER — LAB (OUTPATIENT)
Dept: LAB | Facility: HOSPITAL | Age: 61
End: 2021-07-12

## 2021-07-12 ENCOUNTER — TRANSCRIBE ORDERS (OUTPATIENT)
Dept: ADMINISTRATIVE | Facility: HOSPITAL | Age: 61
End: 2021-07-12

## 2021-07-12 DIAGNOSIS — E29.1 HYPOGONADISM IN MALE: Primary | ICD-10-CM

## 2021-07-12 DIAGNOSIS — Z51.81 ENCOUNTER FOR THERAPEUTIC DRUG MONITORING: ICD-10-CM

## 2021-07-12 DIAGNOSIS — E29.1 HYPOGONADISM IN MALE: ICD-10-CM

## 2021-07-12 LAB
ALBUMIN SERPL-MCNC: 4.6 G/DL (ref 3.5–5.2)
ALBUMIN/GLOB SERPL: 1.8 G/DL
ALP SERPL-CCNC: 53 U/L (ref 39–117)
ALT SERPL W P-5'-P-CCNC: 27 U/L (ref 1–41)
AMPHET+METHAMPHET UR QL: NEGATIVE
ANION GAP SERPL CALCULATED.3IONS-SCNC: 10.5 MMOL/L (ref 5–15)
AST SERPL-CCNC: 14 U/L (ref 1–40)
BARBITURATES UR QL SCN: NEGATIVE
BASOPHILS # BLD AUTO: 0.06 10*3/MM3 (ref 0–0.2)
BASOPHILS NFR BLD AUTO: 0.7 % (ref 0–1.5)
BENZODIAZ UR QL SCN: NEGATIVE
BILIRUB SERPL-MCNC: 0.6 MG/DL (ref 0–1.2)
BUN SERPL-MCNC: 20 MG/DL (ref 8–23)
BUN/CREAT SERPL: 24.1 (ref 7–25)
CALCIUM SPEC-SCNC: 8.8 MG/DL (ref 8.6–10.5)
CANNABINOIDS SERPL QL: NEGATIVE
CHLORIDE SERPL-SCNC: 100 MMOL/L (ref 98–107)
CO2 SERPL-SCNC: 21.5 MMOL/L (ref 22–29)
COCAINE UR QL: NEGATIVE
CREAT SERPL-MCNC: 0.83 MG/DL (ref 0.76–1.27)
DEPRECATED RDW RBC AUTO: 43.2 FL (ref 37–54)
EOSINOPHIL # BLD AUTO: 0.18 10*3/MM3 (ref 0–0.4)
EOSINOPHIL NFR BLD AUTO: 2.1 % (ref 0.3–6.2)
ERYTHROCYTE [DISTWIDTH] IN BLOOD BY AUTOMATED COUNT: 13.5 % (ref 12.3–15.4)
GFR SERPL CREATININE-BSD FRML MDRD: 94 ML/MIN/1.73
GLOBULIN UR ELPH-MCNC: 2.6 GM/DL
GLUCOSE SERPL-MCNC: 192 MG/DL (ref 65–99)
HCT VFR BLD AUTO: 49.6 % (ref 37.5–51)
HGB BLD-MCNC: 16.7 G/DL (ref 13–17.7)
IMM GRANULOCYTES # BLD AUTO: 0.05 10*3/MM3 (ref 0–0.05)
IMM GRANULOCYTES NFR BLD AUTO: 0.6 % (ref 0–0.5)
LYMPHOCYTES # BLD AUTO: 1.5 10*3/MM3 (ref 0.7–3.1)
LYMPHOCYTES NFR BLD AUTO: 17.9 % (ref 19.6–45.3)
MCH RBC QN AUTO: 29.7 PG (ref 26.6–33)
MCHC RBC AUTO-ENTMCNC: 33.7 G/DL (ref 31.5–35.7)
MCV RBC AUTO: 88.1 FL (ref 79–97)
METHADONE UR QL SCN: NEGATIVE
MONOCYTES # BLD AUTO: 0.73 10*3/MM3 (ref 0.1–0.9)
MONOCYTES NFR BLD AUTO: 8.7 % (ref 5–12)
NEUTROPHILS NFR BLD AUTO: 5.88 10*3/MM3 (ref 1.7–7)
NEUTROPHILS NFR BLD AUTO: 70 % (ref 42.7–76)
NRBC BLD AUTO-RTO: 0 /100 WBC (ref 0–0.2)
OPIATES UR QL: NEGATIVE
OXYCODONE UR QL SCN: NEGATIVE
PLATELET # BLD AUTO: 180 10*3/MM3 (ref 140–450)
PMV BLD AUTO: 11.3 FL (ref 6–12)
POTASSIUM SERPL-SCNC: 4.4 MMOL/L (ref 3.5–5.2)
PROT SERPL-MCNC: 7.2 G/DL (ref 6–8.5)
RBC # BLD AUTO: 5.63 10*6/MM3 (ref 4.14–5.8)
SODIUM SERPL-SCNC: 132 MMOL/L (ref 136–145)
WBC # BLD AUTO: 8.4 10*3/MM3 (ref 3.4–10.8)

## 2021-07-12 PROCEDURE — 80307 DRUG TEST PRSMV CHEM ANLYZR: CPT

## 2021-07-12 PROCEDURE — 84402 ASSAY OF FREE TESTOSTERONE: CPT

## 2021-07-12 PROCEDURE — 85025 COMPLETE CBC W/AUTO DIFF WBC: CPT

## 2021-07-12 PROCEDURE — 36415 COLL VENOUS BLD VENIPUNCTURE: CPT

## 2021-07-12 PROCEDURE — 80053 COMPREHEN METABOLIC PANEL: CPT

## 2021-07-12 PROCEDURE — 84403 ASSAY OF TOTAL TESTOSTERONE: CPT

## 2021-07-16 LAB
TESTOST FREE SERPL-MCNC: 3.4 PG/ML (ref 6.6–18.1)
TESTOST SERPL-MCNC: 93 NG/DL (ref 264–916)

## 2021-07-21 ENCOUNTER — OFFICE VISIT (OUTPATIENT)
Dept: ORTHOPEDIC SURGERY | Facility: CLINIC | Age: 61
End: 2021-07-21

## 2021-07-21 VITALS — BODY MASS INDEX: 32.3 KG/M2 | HEIGHT: 66 IN | WEIGHT: 201 LBS | HEART RATE: 61 BPM | OXYGEN SATURATION: 97 %

## 2021-07-21 DIAGNOSIS — G56.03 BILATERAL CARPAL TUNNEL SYNDROME: Primary | ICD-10-CM

## 2021-07-21 PROCEDURE — 99213 OFFICE O/P EST LOW 20 MIN: CPT | Performed by: ORTHOPAEDIC SURGERY

## 2021-07-21 PROCEDURE — 20526 THER INJECTION CARP TUNNEL: CPT | Performed by: ORTHOPAEDIC SURGERY

## 2021-07-21 RX ORDER — EZETIMIBE 10 MG/1
10 TABLET ORAL DAILY
COMMUNITY

## 2021-07-21 RX ORDER — MELOXICAM 15 MG/1
15 TABLET ORAL DAILY PRN
COMMUNITY
Start: 2021-07-18

## 2021-07-21 RX ORDER — MELATONIN
1000 DAILY
COMMUNITY

## 2021-07-21 RX ORDER — LEVOTHYROXINE SODIUM 0.05 MG/1
50 TABLET ORAL
COMMUNITY
Start: 2021-03-15

## 2021-07-21 RX ORDER — CETIRIZINE HYDROCHLORIDE 10 MG/1
10 TABLET ORAL EVERY MORNING
COMMUNITY

## 2021-07-21 RX ORDER — FLUTICASONE PROPIONATE 50 MCG
2 SPRAY, SUSPENSION (ML) NASAL DAILY PRN
COMMUNITY
Start: 2021-07-18

## 2021-07-21 RX ORDER — OMEPRAZOLE 20 MG/1
20 CAPSULE, DELAYED RELEASE ORAL DAILY PRN
COMMUNITY
Start: 2021-02-20

## 2021-07-21 RX ORDER — SILDENAFIL 100 MG/1
TABLET, FILM COATED ORAL
COMMUNITY
Start: 2021-07-18

## 2021-07-21 RX ORDER — ATORVASTATIN CALCIUM 40 MG/1
40 TABLET, FILM COATED ORAL NIGHTLY
COMMUNITY
Start: 2021-07-01

## 2021-07-21 RX ORDER — GLIPIZIDE 10 MG/1
20 TABLET ORAL
COMMUNITY
Start: 2021-03-19

## 2021-07-21 RX ORDER — LISINOPRIL AND HYDROCHLOROTHIAZIDE 12.5; 1 MG/1; MG/1
1 TABLET ORAL EVERY MORNING
COMMUNITY
Start: 2021-02-21

## 2021-07-21 RX ORDER — SITAGLIPTIN AND METFORMIN HYDROCHLORIDE 1000; 50 MG/1; MG/1
1 TABLET, FILM COATED, EXTENDED RELEASE ORAL NIGHTLY
COMMUNITY
End: 2021-10-05

## 2021-07-21 RX ORDER — TESTOSTERONE CYPIONATE 200 MG/ML
175 INJECTION, SOLUTION INTRAMUSCULAR
COMMUNITY
Start: 2021-02-02 | End: 2021-08-09 | Stop reason: SDUPTHER

## 2021-07-21 RX ORDER — AMLODIPINE BESYLATE 5 MG/1
5 TABLET ORAL NIGHTLY
COMMUNITY
Start: 2021-03-05

## 2021-07-21 RX ORDER — GLUCOSAMINE HCL 500 MG
100 TABLET ORAL DAILY
COMMUNITY

## 2021-07-21 RX ADMIN — LIDOCAINE HYDROCHLORIDE 1 ML: 10 INJECTION, SOLUTION INFILTRATION; PERINEURAL at 11:41

## 2021-07-21 NOTE — PROGRESS NOTES
"Chief Complaint  Follow-up of the Right Hand and Follow-up of the Left Hand     Subjective      Osman Holman presents to [facility] for a follow-up of bilateral hands. Patient has a history of bilateral carpal tunnel syndrome that he has been treating conservatively. Patient gets periodic injections in bilateral hands that do provide him with relief of pain, numbness and tingling. He states numbness and tingling is worse with driving and sleeping at night. He is currently taking care of his wife and wants to hold off on surgical intervention at this time.     Allergies   Allergen Reactions   • Penicillins Itching        Social History     Socioeconomic History   • Marital status:      Spouse name: Not on file   • Number of children: Not on file   • Years of education: Not on file   • Highest education level: Not on file   Tobacco Use   • Smoking status: Former Smoker   • Smokeless tobacco: Never Used   • Tobacco comment: 0.5 ppd, smoked 6-10 years   Substance and Sexual Activity   • Alcohol use: Yes     Comment: occasionally drinks   • Drug use: Never        Review of Systems     Objective   Vital Signs:   Pulse 61   Ht 167.6 cm (66\")   Wt 91.2 kg (201 lb)   SpO2 97%   BMI 32.44 kg/m²       Physical Exam  Constitutional:       Appearance: Normal appearance. He is well-developed and normal weight.   HENT:      Head: Normocephalic.      Right Ear: Hearing and external ear normal.      Left Ear: Hearing and external ear normal.      Nose: Nose normal.   Eyes:      Conjunctiva/sclera: Conjunctivae normal.   Cardiovascular:      Rate and Rhythm: Normal rate.   Pulmonary:      Effort: Pulmonary effort is normal.      Breath sounds: No wheezing or rales.   Abdominal:      Palpations: Abdomen is soft.      Tenderness: There is no abdominal tenderness.   Musculoskeletal:      Cervical back: Normal range of motion.   Skin:     Findings: No rash.   Neurological:      Mental Status: He " is alert and oriented to person, place, and time.   Psychiatric:         Mood and Affect: Mood and affect normal.         Judgment: Judgment normal.       Ortho Exam      BILATERAL HANDS: Neurovascular intact. Sensation grossly intact. No swelling, atrophy, and skin discoloration. Skin intact. Full ROM. Patient able to wiggle fingers and make a fist. Full wrist extension, full wrist flexion, full , full thumb opposition, full PIP flexors, full DIP flexors, full PIP extensors, full finger adduction, full finger abduction. Radial pulse 2+, ulnar pulse 2+. Positive Phalen's. Positive median nerve compression. Positive Tinel's.       Small Joint Arthrocentesis  Consent given by: patient  Site marked: site marked  Timeout: Immediately prior to procedure a time out was called to verify the correct patient, procedure, equipment, support staff and site/side marked as required   Procedure Details  Location: right carpal tunnel.  Preparation: Patient was prepped and draped in the usual sterile fashion  Needle gauge: 23G.  Medications administered: 1 mL lidocaine 1 %; 80 mg methylPREDNISolone acetate 80 MG/ML  Patient tolerance: patient tolerated the procedure well with no immediate complications    Small Joint Arthrocentesis  Supporting Documentation  Indications: pain   Procedure Details  Location: left carpal tunnel.  Needle gauge: 23G.  Medications administered: 1 mL lidocaine 1 %; 80 mg methylPREDNISolone acetate 80 MG/ML  Patient tolerance: patient tolerated the procedure well with no immediate complications              Imaging Results (Most Recent)     None           Result Review :       No results found.           Assessment and Plan     DX: Bilateral carpal tunnel syndrome    Discussed treatment plans with the patient. Patient given bilateral carpal tunnel injections and tolerated this procedure well. Patient is considering surgical intervention but states he will call back to schedule this.     Discussed  surgery., Risks/benefits discussed with patient including, but not limited to: infection, bleeding, neurovascular damage, malunion, nonunion, aesthetic deformity, need for further surgery, and death. and Call or return if worsening symptoms.    Follow Up     PRN.       Patient was given instructions and counseling regarding his condition or for health maintenance advice. Please see specific information pulled into the AVS if appropriate.     Scribed for Ab Copeland MD by Mary Emerson.  07/21/21   11:22 EDT    I have personally performed the services described in this document as scribed by the above individual and it is both accurate and complete.  Ab Copeland MD 07/21/21  11:22 EDT

## 2021-07-23 ENCOUNTER — PREP FOR SURGERY (OUTPATIENT)
Dept: OTHER | Facility: HOSPITAL | Age: 61
End: 2021-07-23

## 2021-07-23 DIAGNOSIS — G56.03 BILATERAL CARPAL TUNNEL SYNDROME: Primary | ICD-10-CM

## 2021-07-23 RX ORDER — LIDOCAINE HYDROCHLORIDE 10 MG/ML
1 INJECTION, SOLUTION INFILTRATION; PERINEURAL
Status: COMPLETED | OUTPATIENT
Start: 2021-07-21 | End: 2021-07-21

## 2021-07-23 RX ORDER — CEFAZOLIN SODIUM 2 G/100ML
2 INJECTION, SOLUTION INTRAVENOUS ONCE
Status: CANCELLED | OUTPATIENT
Start: 2021-07-23 | End: 2021-07-23

## 2021-07-23 RX ORDER — CEFAZOLIN SODIUM IN 0.9 % NACL 3 G/100 ML
3 INTRAVENOUS SOLUTION, PIGGYBACK (ML) INTRAVENOUS ONCE
Status: CANCELLED | OUTPATIENT
Start: 2021-07-23 | End: 2021-07-23

## 2021-07-26 ENCOUNTER — PREP FOR SURGERY (OUTPATIENT)
Dept: OTHER | Facility: HOSPITAL | Age: 61
End: 2021-07-26

## 2021-07-26 ENCOUNTER — TELEPHONE (OUTPATIENT)
Dept: ORTHOPEDIC SURGERY | Facility: CLINIC | Age: 61
End: 2021-07-26

## 2021-08-08 PROBLEM — E29.1 HYPOGONADISM IN MALE: Status: ACTIVE | Noted: 2021-08-08

## 2021-08-08 NOTE — PROGRESS NOTES
Chief Complaint    Urologic complaint    Subjective          Osman Holman presents to [facility]  History of Present Illness     61-year-old  gentleman who is on testosterone therapy for hypogonadism.      Energy level and libido ok , unchanged    Had his labs drawn just a couple days before injection    Patient has been having a little more frequency.  Patient does drink a lot of liquid.  No straining.  No burning/dysuria.    Patient is scheduled for bilateral carpal tunnel release upcoming    Previous    Does not want a NP for rectal exam.  We did discuss risk and benefits of rectal exams at this time and unless we see increasing PSA will likely do no further rectal exam.    Was on Depo testosterone testosterone injections 200 mg IM every 2 weeks.  Patient is followed by endocrinology secondary to his diabetes.  She found that his H&H was elevated and had him decrease his testosterone down to 175 mg (0.8 ml) every 2 weeks.  He has been doing okay on this and on repeat H/H he is back within normal limits.  No real changes to his fatigue or libido.      He is doing ok on this new dose.    f/u Today with labs    7/21 creatinine 0.8, GFR 94, H/H  16/49 1/21 creatinine 1.1, > 60, H/H 16/51    Total testosterone    7/21     93  1/21    251  1/20    400  11/18  393  11/17  504      Previous    Patient's main complaint initially was decreased energy and decreased libido.    No side effects or problems with his injections.    Voiding well. no gross hematuria,No history of kidney stone.    No urologic family history,   Has never had any urologic surgery.    No cardiopulmonary history.  Patient does not smoke.  Patient does not use blood thinner.    PSA    1/21     0.44  1/20     0.41  11/18   0.48  11/17   0.46  11/13   0.47    Past History:  Medical History: has a past medical history of Allergic rhinitis, Aortic stenosis, Arthritis, Diabetes (CMS/[facility]), Diabetes mellitus type 2 with  complications, uncontrolled (CMS/[facility]) (01/30/2019), ED (erectile dysfunction), Essential hypertension, GERD (gastroesophageal reflux disease), Heart murmur, Hyperlipemia, Hyperlipidemia, Hypertension, Hypogonadism in male (01/30/2019), Nose irritation (02/27/2020), Nostril sore (02/27/2020), Thyroid disorder, and Vitamin D deficiency.   Surgical History: has a past surgical history that includes Cholecystectomy; Colonoscopy; and Gallbladder surgery.   Family History: family history includes Arthritis in his mother and sister; Diabetes in his father and another family member; Hypertension in his father, mother, sister, and another family member; Stroke in his father.   Social History: reports that he has quit smoking. He has never used smokeless tobacco. He reports current alcohol use. He reports that he does not use drugs.  Allergies: Penicillins       Current Outpatient Medications:   •  amLODIPine (NORVASC) 5 MG tablet, amlodipine 5 mg oral tablet take 1 tablet (5 mg) by oral route once daily   Active, Disp: , Rfl:   •  atorvastatin (LIPITOR) 40 MG tablet, , Disp: , Rfl:   •  cetirizine (zyrTEC) 10 MG tablet, cetirizine 10 mg oral tablet take 1 tablet (10 mg) by oral route once daily   Active, Disp: , Rfl:   •  cholecalciferol (Cholecalciferol) 25 MCG (1000 UT) tablet, Take 1,000 Units by mouth Daily., Disp: , Rfl:   •  Coenzyme Q10 100 MG tablet, Take 100 mg by mouth Daily., Disp: , Rfl:   •  empagliflozin (Jardiance) 25 MG tablet tablet, 25 mg., Disp: , Rfl:   •  ezetimibe (ZETIA) 10 MG tablet, Take 10 mg by mouth Daily., Disp: , Rfl:   •  fluticasone (FLONASE) 50 MCG/ACT nasal spray, , Disp: , Rfl:   •  glipizide (GLUCOTROL) 10 MG tablet, glipizide 10 mg oral tablet take 1 tablet (10 mg) by oral route once daily before a meal   Active, Disp: , Rfl:   •  hydrocortisone 2.5 % cream, , Disp: , Rfl:   •  Ketoconazole 1 % shampoo, Apply  topically to the appropriate area as directed., Disp: , Rfl:   •  levothyroxine  (Synthroid) 50 MCG tablet, Take 50 mcg by mouth., Disp: , Rfl:   •  lisinopril-hydrochlorothiazide (PRINZIDE,ZESTORETIC) 10-12.5 MG per tablet, lisinopril-hydrochlorothiazide 10-12.5 mg oral tablet take 1 tablet by oral route once daily   Suspended, Disp: , Rfl:   •  meloxicam (MOBIC) 15 MG tablet, , Disp: , Rfl:   •  omeprazole (priLOSEC) 20 MG capsule, omeprazole 20 mg oral capsule,delayed release(DR/EC) take 1 capsule (20 mg) by oral route once daily before a meal   Active, Disp: , Rfl:   •  sildenafil (VIAGRA) 100 MG tablet, , Disp: , Rfl:   •  SITagliptin-metFORMIN HCl ER (Janumet XR)  MG tablet, Janumet XR 50-1,000 mg oral tablet, ER multiphase 24 hr take 1 tablet by oral route once daily with the evening meal, swallowing whole. Do not crush, chew and/or divide.   Active, Disp: , Rfl:   •  Testosterone Cypionate (DEPOTESTOTERONE CYPIONATE) 200 MG/ML injection, Inject 175 mg into the appropriate muscle as directed by prescriber., Disp: , Rfl:   •  urea 10 % lotion, Apply  topically to the appropriate area as directed., Disp: , Rfl:      Physical exam       Alert and orient x3  Well appearing, well developed, in no acute distress   Unlabored respirations  Grossly oriented to person, place and time, judgment is intact, normal mood and affect         Objective     Vital Signs:   There were no vitals taken for this visit.             Assessment and Plan    Diagnoses and all orders for this visit:    1. Hypogonadism in male (Primary)    Testosterone just a little low this time, we discussed this today.  It is always been normal in the last few checks and has been high in the past with increasing H/H we will leave his dose alone and continue to follow him at this dose.  Risk and benefits discussed with the patient    continue Depo-Testosterone 175 mg (0.8ml)every 2 weeks IM at home. Refilled today    Patient is using 1 mL vials and wasting the rest    Follow-up in 6 months with a CMP, CBC, PSA and total  testosterone

## 2021-08-09 ENCOUNTER — OFFICE VISIT (OUTPATIENT)
Dept: UROLOGY | Facility: CLINIC | Age: 61
End: 2021-08-09

## 2021-08-09 VITALS — WEIGHT: 204 LBS | HEIGHT: 66 IN | BODY MASS INDEX: 32.78 KG/M2

## 2021-08-09 DIAGNOSIS — E29.1 HYPOGONADISM IN MALE: Primary | ICD-10-CM

## 2021-08-09 PROCEDURE — 99213 OFFICE O/P EST LOW 20 MIN: CPT | Performed by: UROLOGY

## 2021-08-09 RX ORDER — TESTOSTERONE CYPIONATE 200 MG/ML
175 INJECTION, SOLUTION INTRAMUSCULAR
Qty: 6 ML | Refills: 1 | Status: SHIPPED | OUTPATIENT
Start: 2021-08-09 | End: 2022-02-08 | Stop reason: SDUPTHER

## 2021-08-17 ENCOUNTER — TELEPHONE (OUTPATIENT)
Dept: FAMILY MEDICINE CLINIC | Facility: CLINIC | Age: 61
End: 2021-08-17

## 2021-08-17 NOTE — TELEPHONE ENCOUNTER
Caller: Osman Holman    Relationship: Self    Best call back number: [phone]    What was the call regarding: PATIENT IS HAVING AN ISSUE WITH GETTING CLEARANCE FOR SURGERY AND HE WOULD LIKE A CALL BACK TO DISCUSS WITH SO HE CAN GET THIS TAKEN CARE OF    Do you require a callback: YES

## 2021-08-18 ENCOUNTER — TELEPHONE (OUTPATIENT)
Dept: FAMILY MEDICINE CLINIC | Facility: CLINIC | Age: 61
End: 2021-08-18

## 2021-08-18 RX ORDER — METHYLPREDNISOLONE ACETATE 80 MG/ML
80 INJECTION, SUSPENSION INTRA-ARTICULAR; INTRALESIONAL; INTRAMUSCULAR; SOFT TISSUE
Status: COMPLETED | OUTPATIENT
Start: 2021-08-18 | End: 2021-08-18

## 2021-08-18 RX ORDER — LIDOCAINE HYDROCHLORIDE 10 MG/ML
1 INJECTION, SOLUTION INFILTRATION; PERINEURAL
Status: COMPLETED | OUTPATIENT
Start: 2021-08-18 | End: 2021-08-18

## 2021-08-18 RX ADMIN — LIDOCAINE HYDROCHLORIDE 1 ML: 10 INJECTION, SOLUTION INFILTRATION; PERINEURAL at 08:18

## 2021-08-18 RX ADMIN — METHYLPREDNISOLONE ACETATE 80 MG: 80 INJECTION, SUSPENSION INTRA-ARTICULAR; INTRALESIONAL; INTRAMUSCULAR; SOFT TISSUE at 08:18

## 2021-08-18 NOTE — TELEPHONE ENCOUNTER
Caller: Osman Holman    Relationship: Self    Best call back number: [phone]    What is the best time to reach you: ANYTIME    Who are you requesting to speak with (clinical staff, provider,  specific staff member): CLINICAL      What was the call regarding: PATIENT IS REALLY NEEDING TO SPEAK WITH LAVELLE OR MA ABOUT SURGERY CLEARANCE. PATIENT IS DUE TO HAVE SURGERY ON 09/16/21 FOR CARPAL TUNNEL     Do you require a callback: YES

## 2021-08-18 NOTE — TELEPHONE ENCOUNTER
----- Message from Sera Narayan sent at 8/18/2021  8:53 AM EDT -----  Regarding: needs appointment  Patient needs appt for evaluation.

## 2021-08-18 NOTE — TELEPHONE ENCOUNTER
Called patient regarding surgery clearance states he seen Dr Redman, EKG was fine and then called him next day wanted to do a stress test before he would clear him patient states he doesn't have issues just a heart murmur but doesn't want to waste Dr Gibson time if they need a cardiology approval. I told him to call the orthopedics to see if they need a cardiology approval or what but told him I didn't know what Dr Gibson would do that he would need to make an appointment to discuss with him he could say he was ok or tell him to see cardiology.  Patient is going to call Ortho to see what they need and will call back and make an appointment if he needs to

## 2021-08-18 NOTE — TELEPHONE ENCOUNTER
(Hub to share)  Patient must have appointment before any surgical clearance can be given.  Needs to keep 11/2021 appointment.

## 2021-08-19 RX ORDER — ASPIRIN 81 MG/1
81 TABLET, CHEWABLE ORAL DAILY
COMMUNITY

## 2021-10-05 RX ORDER — SITAGLIPTIN AND METFORMIN HYDROCHLORIDE 1000; 100 MG/1; MG/1
1 TABLET, FILM COATED, EXTENDED RELEASE ORAL NIGHTLY
COMMUNITY
End: 2022-12-05

## 2021-10-05 NOTE — PRE-PROCEDURE INSTRUCTIONS
PRE-OP INSTRUCTIONS REVIEWED WITH PATIENT: FASTING/BATHING/ARRIVAL PROCEDURES.  INSTRUCTED TO TAKE A.M. DAY OF SURGERY: zyrtec, zetia, synthroid  UNDERSTANDING VERBALIZED.

## 2021-10-06 ENCOUNTER — ANESTHESIA EVENT (OUTPATIENT)
Dept: PERIOP | Facility: HOSPITAL | Age: 61
End: 2021-10-06

## 2021-10-06 NOTE — ANESTHESIA PREPROCEDURE EVALUATION
Anesthesia Evaluation     Patient summary reviewed and Nursing notes reviewed   no history of anesthetic complications:  NPO Solid Status: > 8 hours  NPO Liquid Status: > 2 hours           Airway   Mallampati: I  TM distance: >3 FB  Neck ROM: full  No difficulty expected  Dental      Pulmonary - normal exam    breath sounds clear to auscultation  (+) a smoker Former,   Cardiovascular   Exercise tolerance: good (4-7 METS)    Rhythm: regular    (+) hypertension, valvular problems/murmurs AS and murmur, murmur, hyperlipidemia,     ROS comment:  HX of CAD,Aortic Stenosis followed by Dr. Юлия Olivier 11/20/19 EF 60% with Moderate AS. Clearance on chart from Юлия.     Neuro/Psych- negative ROS  GI/Hepatic/Renal/Endo    (+)   diabetes mellitus,     Musculoskeletal (-) negative ROS    Abdominal    Substance History - negative use     OB/GYN negative ob/gyn ROS         Other - negative ROS       ROS/Med Hx Other: HX of CAD,Aortic Stenosis followed by Dr. Юлия Olivier 11/20/19 EF 60% with Moderate AS. Clearance on chart from Юлия.                 Anesthesia Plan    ASA 3     general       Anesthetic plan, all risks, benefits, and alternatives have been provided, discussed and informed consent has been obtained with: patient.

## 2021-10-12 ENCOUNTER — ANESTHESIA (OUTPATIENT)
Dept: PERIOP | Facility: HOSPITAL | Age: 61
End: 2021-10-12

## 2021-10-12 ENCOUNTER — HOSPITAL ENCOUNTER (OUTPATIENT)
Facility: HOSPITAL | Age: 61
Setting detail: HOSPITAL OUTPATIENT SURGERY
Discharge: HOME | End: 2021-10-12
Attending: ORTHOPAEDIC SURGERY | Admitting: ORTHOPAEDIC SURGERY

## 2021-10-12 VITALS
HEART RATE: 80 BPM | SYSTOLIC BLOOD PRESSURE: 136 MMHG | HEIGHT: 65 IN | BODY MASS INDEX: 33.79 KG/M2 | RESPIRATION RATE: 20 BRPM | OXYGEN SATURATION: 96 % | WEIGHT: 202.82 LBS | TEMPERATURE: 98 F | DIASTOLIC BLOOD PRESSURE: 77 MMHG

## 2021-10-12 DIAGNOSIS — G56.03 BILATERAL CARPAL TUNNEL SYNDROME: ICD-10-CM

## 2021-10-12 DIAGNOSIS — G56.03 CARPAL TUNNEL SYNDROME, BILATERAL: Primary | ICD-10-CM

## 2021-10-12 LAB — GLUCOSE BLDC GLUCOMTR-MCNC: 178 MG/DL (ref 70–99)

## 2021-10-12 PROCEDURE — 25010000002 KETOROLAC TROMETHAMINE PER 15 MG: Performed by: NURSE ANESTHETIST, CERTIFIED REGISTERED

## 2021-10-12 PROCEDURE — 25010000003 CEFAZOLIN IN DEXTROSE 2-4 GM/100ML-% SOLUTION: Performed by: ORTHOPAEDIC SURGERY

## 2021-10-12 PROCEDURE — 25010000002 MIDAZOLAM PER 1MG: Performed by: ANESTHESIOLOGY

## 2021-10-12 PROCEDURE — 25010000002 DEXAMETHASONE PER 1 MG: Performed by: NURSE ANESTHETIST, CERTIFIED REGISTERED

## 2021-10-12 PROCEDURE — 64721 CARPAL TUNNEL SURGERY: CPT | Performed by: ORTHOPAEDIC SURGERY

## 2021-10-12 PROCEDURE — 25010000002 HYDROMORPHONE 1 MG/ML SOLUTION: Performed by: NURSE ANESTHETIST, CERTIFIED REGISTERED

## 2021-10-12 PROCEDURE — 25010000002 PROPOFOL 10 MG/ML EMULSION: Performed by: NURSE ANESTHETIST, CERTIFIED REGISTERED

## 2021-10-12 PROCEDURE — 25010000002 FENTANYL CITRATE (PF) 50 MCG/ML SOLUTION: Performed by: NURSE ANESTHETIST, CERTIFIED REGISTERED

## 2021-10-12 PROCEDURE — 82962 GLUCOSE BLOOD TEST: CPT

## 2021-10-12 PROCEDURE — 25010000002 ONDANSETRON PER 1 MG: Performed by: NURSE ANESTHETIST, CERTIFIED REGISTERED

## 2021-10-12 RX ORDER — HYDROCODONE BITARTRATE AND ACETAMINOPHEN 7.5; 325 MG/1; MG/1
1 TABLET ORAL EVERY 4 HOURS PRN
Qty: 20 TABLET | Refills: 0 | Status: SHIPPED | OUTPATIENT
Start: 2021-10-12 | End: 2021-11-23

## 2021-10-12 RX ORDER — MEPERIDINE HYDROCHLORIDE 25 MG/ML
12.5 INJECTION INTRAMUSCULAR; INTRAVENOUS; SUBCUTANEOUS
Status: DISCONTINUED | OUTPATIENT
Start: 2021-10-12 | End: 2021-10-12 | Stop reason: HOSPADM

## 2021-10-12 RX ORDER — DEXAMETHASONE SODIUM PHOSPHATE 4 MG/ML
INJECTION, SOLUTION INTRA-ARTICULAR; INTRALESIONAL; INTRAMUSCULAR; INTRAVENOUS; SOFT TISSUE AS NEEDED
Status: DISCONTINUED | OUTPATIENT
Start: 2021-10-12 | End: 2021-10-12 | Stop reason: SURG

## 2021-10-12 RX ORDER — MIDAZOLAM HYDROCHLORIDE 2 MG/2ML
2 INJECTION, SOLUTION INTRAMUSCULAR; INTRAVENOUS ONCE
Status: COMPLETED | OUTPATIENT
Start: 2021-10-12 | End: 2021-10-12

## 2021-10-12 RX ORDER — CEFAZOLIN SODIUM IN 0.9 % NACL 3 G/100 ML
3 INTRAVENOUS SOLUTION, PIGGYBACK (ML) INTRAVENOUS ONCE
Status: DISCONTINUED | OUTPATIENT
Start: 2021-10-12 | End: 2021-10-12

## 2021-10-12 RX ORDER — PROMETHAZINE HYDROCHLORIDE 25 MG/1
25 SUPPOSITORY RECTAL ONCE AS NEEDED
Status: DISCONTINUED | OUTPATIENT
Start: 2021-10-12 | End: 2021-10-12 | Stop reason: HOSPADM

## 2021-10-12 RX ORDER — ONDANSETRON 2 MG/ML
4 INJECTION INTRAMUSCULAR; INTRAVENOUS ONCE AS NEEDED
Status: DISCONTINUED | OUTPATIENT
Start: 2021-10-12 | End: 2021-10-12 | Stop reason: HOSPADM

## 2021-10-12 RX ORDER — PROMETHAZINE HYDROCHLORIDE 12.5 MG/1
25 TABLET ORAL ONCE AS NEEDED
Status: DISCONTINUED | OUTPATIENT
Start: 2021-10-12 | End: 2021-10-12 | Stop reason: HOSPADM

## 2021-10-12 RX ORDER — SODIUM CHLORIDE, SODIUM LACTATE, POTASSIUM CHLORIDE, CALCIUM CHLORIDE 600; 310; 30; 20 MG/100ML; MG/100ML; MG/100ML; MG/100ML
9 INJECTION, SOLUTION INTRAVENOUS CONTINUOUS PRN
Status: DISCONTINUED | OUTPATIENT
Start: 2021-10-12 | End: 2021-10-12 | Stop reason: HOSPADM

## 2021-10-12 RX ORDER — OXYCODONE HYDROCHLORIDE 5 MG/1
5 TABLET ORAL
Status: DISCONTINUED | OUTPATIENT
Start: 2021-10-12 | End: 2021-10-12 | Stop reason: HOSPADM

## 2021-10-12 RX ORDER — LIDOCAINE HYDROCHLORIDE 20 MG/ML
INJECTION, SOLUTION INFILTRATION; PERINEURAL AS NEEDED
Status: DISCONTINUED | OUTPATIENT
Start: 2021-10-12 | End: 2021-10-12 | Stop reason: SURG

## 2021-10-12 RX ORDER — PROPOFOL 10 MG/ML
VIAL (ML) INTRAVENOUS AS NEEDED
Status: DISCONTINUED | OUTPATIENT
Start: 2021-10-12 | End: 2021-10-12 | Stop reason: SURG

## 2021-10-12 RX ORDER — KETOROLAC TROMETHAMINE 30 MG/ML
INJECTION, SOLUTION INTRAMUSCULAR; INTRAVENOUS AS NEEDED
Status: DISCONTINUED | OUTPATIENT
Start: 2021-10-12 | End: 2021-10-12 | Stop reason: SURG

## 2021-10-12 RX ORDER — FENTANYL CITRATE 50 UG/ML
INJECTION, SOLUTION INTRAMUSCULAR; INTRAVENOUS AS NEEDED
Status: DISCONTINUED | OUTPATIENT
Start: 2021-10-12 | End: 2021-10-12 | Stop reason: SURG

## 2021-10-12 RX ORDER — ACETAMINOPHEN 500 MG
1000 TABLET ORAL ONCE
Status: COMPLETED | OUTPATIENT
Start: 2021-10-12 | End: 2021-10-12

## 2021-10-12 RX ORDER — MAGNESIUM HYDROXIDE 1200 MG/15ML
LIQUID ORAL AS NEEDED
Status: DISCONTINUED | OUTPATIENT
Start: 2021-10-12 | End: 2021-10-12 | Stop reason: HOSPADM

## 2021-10-12 RX ORDER — ONDANSETRON 2 MG/ML
INJECTION INTRAMUSCULAR; INTRAVENOUS AS NEEDED
Status: DISCONTINUED | OUTPATIENT
Start: 2021-10-12 | End: 2021-10-12 | Stop reason: SURG

## 2021-10-12 RX ORDER — CEFAZOLIN SODIUM 2 G/100ML
2 INJECTION, SOLUTION INTRAVENOUS ONCE
Status: COMPLETED | OUTPATIENT
Start: 2021-10-12 | End: 2021-10-12

## 2021-10-12 RX ADMIN — LIDOCAINE HYDROCHLORIDE 100 MG: 20 INJECTION, SOLUTION INFILTRATION; PERINEURAL at 07:08

## 2021-10-12 RX ADMIN — PROPOFOL 200 MG: 10 INJECTION, EMULSION INTRAVENOUS at 07:08

## 2021-10-12 RX ADMIN — ONDANSETRON 4 MG: 2 INJECTION INTRAMUSCULAR; INTRAVENOUS at 07:08

## 2021-10-12 RX ADMIN — CEFAZOLIN SODIUM 2 G: 2 INJECTION, SOLUTION INTRAVENOUS at 07:05

## 2021-10-12 RX ADMIN — FENTANYL CITRATE 100 MCG: 50 INJECTION INTRAMUSCULAR; INTRAVENOUS at 07:08

## 2021-10-12 RX ADMIN — SODIUM CHLORIDE, POTASSIUM CHLORIDE, SODIUM LACTATE AND CALCIUM CHLORIDE 9 ML/HR: 600; 310; 30; 20 INJECTION, SOLUTION INTRAVENOUS at 06:51

## 2021-10-12 RX ADMIN — DEXAMETHASONE SODIUM PHOSPHATE 4 MG: 4 INJECTION INTRA-ARTICULAR; INTRALESIONAL; INTRAMUSCULAR; INTRAVENOUS; SOFT TISSUE at 07:08

## 2021-10-12 RX ADMIN — HYDROMORPHONE HYDROCHLORIDE 0.5 MG: 1 INJECTION, SOLUTION INTRAMUSCULAR; INTRAVENOUS; SUBCUTANEOUS at 08:14

## 2021-10-12 RX ADMIN — KETOROLAC TROMETHAMINE 30 MG: 30 INJECTION, SOLUTION INTRAMUSCULAR; INTRAVENOUS at 07:35

## 2021-10-12 RX ADMIN — MIDAZOLAM HYDROCHLORIDE 2 MG: 1 INJECTION, SOLUTION INTRAMUSCULAR; INTRAVENOUS at 06:52

## 2021-10-12 RX ADMIN — HYDROMORPHONE HYDROCHLORIDE 0.5 MG: 1 INJECTION, SOLUTION INTRAMUSCULAR; INTRAVENOUS; SUBCUTANEOUS at 08:02

## 2021-10-12 RX ADMIN — ACETAMINOPHEN 1000 MG: 500 TABLET ORAL at 06:52

## 2021-10-12 NOTE — OP NOTE
CARPAL TUNNEL RELEASE  Procedure Report    Patient Name:  Osman Holman  YOB: 1960    Date of Surgery:  10/12/2021       Pre-op Diagnosis:   Bilateral carpal tunnel syndrome [G56.03]       Post-Op Diagnosis Codes:     * Bilateral carpal tunnel syndrome [G56.03]    Procedure/CPT® Codes:      Procedure(s):  BILATERAL CARPAL TUNNEL RELEASE    Staff:  Surgeon(s):  Ab Copeland MD    Assistant: Astrid Castellano    Anesthesia: Choice    Estimated Blood Loss: 2 mL    Implants:    Nothing was implanted during the procedure    Specimen:          None      Complications: None    Description of Procedure:   :   The patient was informed of the risks and benefits.  The patient was taken to the operating room and placed supine after general  anesthesia was established.  The bilateral hands and upper extremity were prepped and draped satisfactorily using alcohol and ChloraPrep.      A standard incision was made on the left hand just ulnar to the thenar crease to the thumb, approximately 1.5 cm length, carried down through subcutaneous tissue and fascia, carried down to the palmaris fascia with tenotomy scissors.  Using a 69 blade the transverse carpal ligament was released over the median nerve.  The median nerve had significant signs of compression.  A [place] elevator was used to protect the nerves during the release and after release had been completed, it was checked proximally and distally and under loupe magnification was satisfactory.  The nerve was inspected and was intact along with the motor branch.  The wound was copiously irrigated.  The skin was closed with horizontal mattress suture nylon and interrupted 3-0 horizontal mattress sutures and the incision was washed and dried and sterile dressings were applied.   A standard incision was made on the right hand just ulnar to the thenar crease to the thumb, approximately 1.5 cm length, carried down through subcutaneous tissue and fascia, carried down to  the palmaris fascia with tenotomy scissors.  Using a 69 blade the transverse carpal ligament was released over the median nerve.  The median nerve had significant signs of compression.  A [place] elevator was used to protect the nerves during the release and after release had been completed, it was checked proximally and distally and under loupe magnification was satisfactory.  The nerve was inspected and was intact along with the motor branch.  The wound was copiously irrigated.  The skin was closed with horizontal mattress suture nylon and interrupted 3-0 horizontal mattress sutures and the incision was washed and dried and sterile dressings were applied.         Ab Copeland MD     Date: 10/12/2021  Time: 07:50 EDT

## 2021-10-12 NOTE — H&P
"History and physical    Chief Complaint  Follow-up of the Right Hand and Follow-up of the Left Hand        Subjective          Osman Holman presents to [facility] for a follow-up of bilateral hands. Patient has a history of bilateral carpal tunnel syndrome that he has been treating conservatively. Patient gets periodic injections in bilateral hands that do provide him with relief of pain, numbness and tingling. He states numbness and tingling is worse with driving and sleeping at night. He is currently taking care of his wife and wants to hold off on surgical intervention at this time.           Allergies   Allergen Reactions   • Penicillins Itching         Social History            Socioeconomic History   • Marital status:        Spouse name: Not on file   • Number of children: Not on file   • Years of education: Not on file   • Highest education level: Not on file   Tobacco Use   • Smoking status: Former Smoker   • Smokeless tobacco: Never Used   • Tobacco comment: 0.5 ppd, smoked 6-10 years   Substance and Sexual Activity   • Alcohol use: Yes       Comment: occasionally drinks   • Drug use: Never         Review of Systems            Objective      Vital Signs:   Pulse 61   Ht 167.6 cm (66\")   Wt 91.2 kg (201 lb)   SpO2 97%   BMI 32.44 kg/m²        Physical Exam  Constitutional:       Appearance: Normal appearance. He is well-developed and normal weight.   HENT:      Head: Normocephalic.      Right Ear: Hearing and external ear normal.      Left Ear: Hearing and external ear normal.      Nose: Nose normal.   Eyes:      Conjunctiva/sclera: Conjunctivae normal.   Cardiovascular:      Rate and Rhythm: Normal rate.   Pulmonary:      Effort: Pulmonary effort is normal.      Breath sounds: No wheezing or rales.   Abdominal:      Palpations: Abdomen is soft.      Tenderness: There is no abdominal tenderness.   Musculoskeletal:      Cervical back: Normal range of motion. "   Skin:     Findings: No rash.   Neurological:      Mental Status: He is alert and oriented to person, place, and time.   Psychiatric:         Mood and Affect: Mood and affect normal.         Judgment: Judgment normal.         Ortho Exam       BILATERAL HANDS: Neurovascular intact. Sensation grossly intact. No swelling, atrophy, and skin discoloration. Skin intact. Full ROM. Patient able to wiggle fingers and make a fist. Full wrist extension, full wrist flexion, full , full thumb opposition, full PIP flexors, full DIP flexors, full PIP extensors, full finger adduction, full finger abduction. Radial pulse 2+, ulnar pulse 2+. Positive Phalen's. Positive median nerve compression. Positive Tinel's.       DX: Bilateral carpal tunnel syndrome patient with advanced bilateral carpal tunnel syndrome.  See previous EMG nerve conduction studies.  At this point after exhausting conservative treatment including braces stretches activity modification multiple injections he would like to proceed with bilateral carpal tunnel release     Discussed treatment plans with the patient. Patient given bilateral carpal tunnel injections and tolerated this procedure well. Patient is considering surgical intervention but states he will call back to schedule this.      Discussed surgery., Risks/benefits discussed with patient including, but not limited to: infection, bleeding, neurovascular damage, malunion, nonunion, aesthetic deformity, need for further surgery, and death.  Possible damage to neurovascular structures and Call or return if worsening symptoms.

## 2021-10-12 NOTE — DISCHARGE INSTRUCTIONS
DISCHARGE INSTRUCTIONS  CARPAL TUNNEL RELEASE      ? For your surgery you had:  ? Local anesthesia  ? Monitored anesthesia care  ? You may experience dizziness, drowsiness, or lightheadedness for several hours following surgery.  ? Do not stay alone today or tonight.  ? Limit your activity for 24 hours.  ? Resume your diet slowly.    ? You should not drive or operate machinery, drink alcohol, or sign legally binding documents for 24 hours or while you are taking pain medication.  ? If you had an axillary or regional block, you may not have control of the involved limb for up to 12 hours. Protect the arm with a sling or follow your physician's specific instructions. Carry the upper arm in a sling so that the hand and wrist are above the level of the heart. Elevate affected arm on a pillow when resting.   ? Use ice to affected area for 48-72 hours. Apply 20 minutes on - 20 minutes off. Do NOT apply directly to skin.  ? Exercise fingers frequently by making a full fist and fully straightening the fingers. This will help prevent swelling and stiffness.  ? Do NOT do any heavy lifting, pulling or strenuous activities using the affected hand. [x] Keep splint clean and dry.  [x] Do NOT submerge in water.  [x] Keep incision area clean and dry.  [x] You may shower or bathe tomorrow, keep dressing clean and dry.  NOTIFY YOUR DOCTOR IF YOU EXPERIENCE ANY OF THE FOLLOWING:  ? Temperature greater than 101 degrees Fahrenheit  ? Shaking Chills  ? Redness or excessive drainage from incision  ? Nausea, vomiting and/or pain that is not controlled by prescribed medications  ? Increase in bleeding or bleeding that is excessive  ? Unable to urinate in 6 hours after surgery  ? If unable to reach your doctor, please go to the closest Emergency Room    Last dose of pain medication was given at: may take norco next at 1pm. May take ibuprofen next at 1:35pm.    SPECIAL INSTRUCTIONS:  Follow verbal instructions given to your family by your  doctor.

## 2021-10-12 NOTE — ANESTHESIA POSTPROCEDURE EVALUATION
Patient: Osman Holman    Procedure Summary     Date: 10/12/21 Room / Location: [facility] OR 03 / [facility] MAIN OR    Anesthesia Start: 0704 Anesthesia Stop: 0750    Procedure: BILATERAL CARPAL TUNNEL RELEASE (Bilateral Wrist) Diagnosis:       Bilateral carpal tunnel syndrome      (Bilateral carpal tunnel syndrome [G56.03])    Surgeons: Ab Copeland MD Provider: Miguel A Spain MD    Anesthesia Type: general ASA Status: 3          Anesthesia Type: general    Vitals  Vitals Value Taken Time   /67 10/12/21 0754   Temp 36.8 °C (98.3 °F) 10/12/21 0743   Pulse 90 10/12/21 0754   Resp 18 10/12/21 0753   SpO2 96 % 10/12/21 0754   Vitals shown include unvalidated device data.        Post Anesthesia Care and Evaluation    Patient location during evaluation: bedside  Patient participation: complete - patient participated  Level of consciousness: awake  Pain management: adequate  Airway patency: patent  Anesthetic complications: No anesthetic complications  PONV Status: none  Cardiovascular status: acceptable and stable  Respiratory status: acceptable  Hydration status: acceptable    Comments: An Anesthesiologist personally participated in the most demanding procedures (including induction and emergence if applicable) in the anesthesia plan, monitored the course of anesthesia administration at frequent intervals and remained physically present and available for immediate diagnosis and treatment of emergencies.

## 2021-10-18 ENCOUNTER — CLINICAL SUPPORT (OUTPATIENT)
Dept: FAMILY MEDICINE CLINIC | Facility: CLINIC | Age: 61
End: 2021-10-18

## 2021-10-18 DIAGNOSIS — Z23 NEED FOR IMMUNIZATION AGAINST INFLUENZA: Primary | ICD-10-CM

## 2021-10-18 PROCEDURE — 90471 IMMUNIZATION ADMIN: CPT | Performed by: FAMILY MEDICINE

## 2021-10-18 PROCEDURE — 90686 IIV4 VACC NO PRSV 0.5 ML IM: CPT | Performed by: FAMILY MEDICINE

## 2021-10-25 ENCOUNTER — OFFICE VISIT (OUTPATIENT)
Dept: ORTHOPEDIC SURGERY | Facility: CLINIC | Age: 61
End: 2021-10-25

## 2021-10-25 VITALS — HEART RATE: 84 BPM | HEIGHT: 66 IN | WEIGHT: 209.6 LBS | OXYGEN SATURATION: 98 % | BODY MASS INDEX: 33.68 KG/M2

## 2021-10-25 DIAGNOSIS — Z47.89 AFTERCARE FOLLOWING SURGERY OF THE MUSCULOSKELETAL SYSTEM: Primary | ICD-10-CM

## 2021-10-25 PROCEDURE — 99024 POSTOP FOLLOW-UP VISIT: CPT | Performed by: PHYSICIAN ASSISTANT

## 2021-10-25 NOTE — PROGRESS NOTES
"Chief Complaint  Pain of the Left Hand and Pain of the Right Hand    Subjective          Osman Holman presents to [facility] for s/p bilateral CTR performed by Dr. Copeland on 10/12/21.  Patient reports still having some tingling and soreness of his hands.  He states the soreness is mostly around the incision.  He states swelling is slowly improving following his surgery.  He presents today postop splints and states he has been compliant with the use.    Objective   Allergies   Allergen Reactions   • Penicillins Itching     PT REPORTED CAN TAKE KEFLEX NO REACTIONS        Vital Signs:   Pulse 84   Ht 167.6 cm (66\")   Wt 95.1 kg (209 lb 9.6 oz)   SpO2 98%   BMI 33.83 kg/m²       Physical Exam  Constitutional:       Appearance: Normal appearance. Patient is well-developed and normal weight.   HENT:      Head: Normocephalic.      Right Ear: Hearing and external ear normal.      Left Ear: Hearing and external ear normal.      Nose: Nose normal.   Eyes:      Conjunctiva/sclera: Conjunctivae normal.   Cardiovascular:      Rate and Rhythm: Normal rate.   Pulmonary:      Effort: Pulmonary effort is normal.      Breath sounds: No wheezing or rales.   Abdominal:      Palpations: Abdomen is soft.      Tenderness: There is no abdominal tenderness.   Musculoskeletal:      Cervical back: Normal range of motion.   Skin:     Findings: No rash.   Neurological:      Mental Status: Patient is alert and oriented to person, place, and time.   Psychiatric:         Mood and Affect: Mood and affect normal.         Judgment: Judgment normal.     Ortho Exam  Bilateral hands: Sensation grossly intact, neurovascular intact, radial and ulnar pulses are 2+.  Incisions are with signs of early healing, without surrounding erythema or drainage.  Good muscle tone of wrist flexors and extensors.  Normal thumb opposition and abduction.  Full range of motion of the digits, able to make a fist with good  strength.  " AIN, PIN, ulnar, median and radial nerve intact.    Result Review :   The following data was reviewed by: MY Sellers on 10/25/2021:         Imaging Results (Most Recent)     None                Assessment and Plan    Problem List Items Addressed This Visit        Musculoskeletal and Injuries    Aftercare following bilateral carpal tunnel release - Primary    Relevant Orders    Ambulatory Referral to Occupational Therapy          Follow Up   Return in about 3 weeks (around 11/15/2021).  Patient Instructions   Discussed treatment course with patient. Home exercises for hand  strengthening given today. Order for OT/hand therapy given as well, should patient decide to go.   Ice and elevation for swelling  Keep incisions dry, clean and uncovered  Follow up in 3 weeks to evaluate improvement    Patient was given instructions and counseling regarding his condition or for health maintenance advice. Please see specific information pulled into the AVS if appropriate.

## 2021-10-25 NOTE — PATIENT INSTRUCTIONS
Discussed treatment course with patient. Home exercises for hand  strengthening given today. Order for OT/hand therapy given as well, should patient decide to go.   Ice and elevation for swelling  Keep incisions dry, clean and uncovered  Follow up in 3 weeks to evaluate improvement

## 2021-10-28 ENCOUNTER — TELEPHONE (OUTPATIENT)
Dept: FAMILY MEDICINE CLINIC | Facility: CLINIC | Age: 61
End: 2021-10-28

## 2021-10-28 NOTE — TELEPHONE ENCOUNTER
Discussed with patient told him he had his wife both needed the booster it was safer to get the booster and we do not use antibody levels to decide against the booster

## 2021-10-28 NOTE — TELEPHONE ENCOUNTER
Caller: Osman Holman    Relationship: Self    Best call back number: [phone]    What is the best time to reach you: ANYTIME, CAN LEAVE A DETAILED MESSAGE     Who are you requesting to speak with (clinical staff, provider,  specific staff member): NURSE    What was the call regarding: OMAIRAET IS WANTING TO TALK TO  ABOUT GETTING AN ANTIBODY TEST TO SEE WHERE THEY STAND WITH THE COVID ANTIBODY BEFORE HAVING BOOSTER DONE. PATIENT HAD THE MODERNA SHOT.     Do you require a callback: YES

## 2021-10-28 NOTE — TELEPHONE ENCOUNTER
Patient called back and spoke with Andree. Message sent to Dr Gibson so that he can speak to patient.

## 2021-11-15 ENCOUNTER — OFFICE VISIT (OUTPATIENT)
Dept: ORTHOPEDIC SURGERY | Facility: CLINIC | Age: 61
End: 2021-11-15

## 2021-11-15 VITALS — HEART RATE: 86 BPM | WEIGHT: 206.8 LBS | OXYGEN SATURATION: 95 % | HEIGHT: 66 IN | BODY MASS INDEX: 33.23 KG/M2

## 2021-11-15 DIAGNOSIS — Z47.89 AFTERCARE FOLLOWING SURGERY OF THE MUSCULOSKELETAL SYSTEM: Primary | ICD-10-CM

## 2021-11-15 PROCEDURE — 99024 POSTOP FOLLOW-UP VISIT: CPT | Performed by: PHYSICIAN ASSISTANT

## 2021-11-15 NOTE — PATIENT INSTRUCTIONS
We discussed treatment options today. Patient will continue with home exercises and self-massage of hand/wrist.     Topical diclofenac prescribed today to use on as needed.   Recommend use of braces bilateral during rest.   Follow up in 6 weeks to reevaluate improvement.

## 2021-11-15 NOTE — PROGRESS NOTES
"Chief Complaint  Pain of the Right Wrist and Pain of the Left Wrist    Subjective          Osman Holman presents to [facility] for s/p bilateral carpal tunnel release performed by Dr. Copeland on 10/12/2021.  Patient opted to continue with home exercises versus formal therapy following his last visit.  He states he still has some pain distal to his incisions.  He demonstrated several exercises that he is performing at home.  He has also been working on hand strengthening.  He states he still has some tingling sensation of his digits.  He has not been using braces during rest.    Objective   Allergies   Allergen Reactions   • Penicillins Itching     PT REPORTED CAN TAKE KEFLEX NO REACTIONS        Vital Signs:   Pulse 86   Ht 167.6 cm (66\")   Wt 93.8 kg (206 lb 12.8 oz)   SpO2 95%   BMI 33.38 kg/m²       Physical Exam  Constitutional:       Appearance: Normal appearance. Patient is well-developed and normal weight.   HENT:      Head: Normocephalic.      Right Ear: Hearing and external ear normal.      Left Ear: Hearing and external ear normal.      Nose: Nose normal.   Eyes:      Conjunctiva/sclera: Conjunctivae normal.   Cardiovascular:      Rate and Rhythm: Normal rate.   Pulmonary:      Effort: Pulmonary effort is normal.      Breath sounds: No wheezing or rales.   Abdominal:      Palpations: Abdomen is soft.      Tenderness: There is no abdominal tenderness.   Musculoskeletal:      Cervical back: Normal range of motion.   Skin:     Findings: No rash.   Neurological:      Mental Status: Patient is alert and oriented to person, place, and time.   Psychiatric:         Mood and Affect: Mood and affect normal.         Judgment: Judgment normal.     Ortho Exam  Bilateral hands: Scars are well-healed.  No tenderness to palpation.  No atrophy or swelling.  Able make a fist with good  strength.  Normal thumb opposition and abduction.  Able to do full prayer stretch.  Limited wrist " flexion.  Good muscle tone of wrist flexors and extensors.  Sensation grossly intact.  Neurovascular intact.  Radial and ulnar pulse 2+.    Result Review :   The following data was reviewed by: MY Sellers on 11/15/2021:         Imaging Results (Most Recent)     None                Assessment and Plan    Problem List Items Addressed This Visit        Musculoskeletal and Injuries    Aftercare following bilateral carpal tunnel release - Primary          Follow Up   Return in about 6 weeks (around 12/27/2021).  Patient Instructions   We discussed treatment options today. Patient will continue with home exercises and self-massage of hand/wrist.     Topical diclofenac prescribed today to use on as needed.   Recommend use of braces bilateral during rest.   Follow up in 6 weeks to reevaluate improvement.    Patient was given instructions and counseling regarding his condition or for health maintenance advice. Please see specific information pulled into the AVS if appropriate.

## 2021-11-17 PROBLEM — I35.0 NONRHEUMATIC AORTIC VALVE STENOSIS: Status: ACTIVE | Noted: 2021-11-17

## 2021-11-17 PROBLEM — R01.1 HEART MURMUR: Status: ACTIVE | Noted: 2021-11-17

## 2021-11-18 ENCOUNTER — OFFICE VISIT (OUTPATIENT)
Dept: CARDIOLOGY | Facility: CLINIC | Age: 61
End: 2021-11-18

## 2021-11-18 VITALS
HEIGHT: 66 IN | DIASTOLIC BLOOD PRESSURE: 74 MMHG | WEIGHT: 203 LBS | HEART RATE: 76 BPM | BODY MASS INDEX: 32.62 KG/M2 | SYSTOLIC BLOOD PRESSURE: 148 MMHG

## 2021-11-18 DIAGNOSIS — I51.7 LVH (LEFT VENTRICULAR HYPERTROPHY): ICD-10-CM

## 2021-11-18 DIAGNOSIS — I35.0 NONRHEUMATIC AORTIC VALVE STENOSIS: Primary | ICD-10-CM

## 2021-11-18 DIAGNOSIS — E78.5 DYSLIPIDEMIA: ICD-10-CM

## 2021-11-18 DIAGNOSIS — I10 ESSENTIAL HYPERTENSION: ICD-10-CM

## 2021-11-18 PROCEDURE — 99204 OFFICE O/P NEW MOD 45 MIN: CPT | Performed by: INTERNAL MEDICINE

## 2021-11-18 NOTE — ASSESSMENT & PLAN NOTE
Patient with moderate to severe by recent echocardiogram no overt symptoms recommend repeating echocardiogram in 6 months.

## 2021-11-18 NOTE — PROGRESS NOTES
Chief Complaint  Establish Care and Heart Murmur      History of Present Illness  Osman Holman presents to [facility]  Patient is a 61-year-old with previous aortic stenosis, diabetes, hypertension, and dyslipidemia who is here to severs her cardiologist he previously has been diagnosed with aortic stenosis on echocardiograms he denies any ongoing chest pain shortness of breath and syncope or presyncopal spells.  He has not been experiencing PND orthopnea and physically has no complaints    Past Medical History:   Diagnosis Date   • Allergic rhinitis    • Aortic stenosis     ASYPTOMATIC- SEE'S DR. PACK, DENIED CP/SOB   • Arthritis    • Carpal tunnel syndrome on both sides CURRENTLY   • Dermatitis    • Diabetes (HCC)     TYPE II, BG RUNS UNDER 200 IN AM   • Diabetes mellitus type 2 with complications, uncontrolled (HCC) 01/30/2019   • ED (erectile dysfunction)    • Essential hypertension    • GERD (gastroesophageal reflux disease)    • Heart murmur     ASYMPTOMATIC- SEE'S DR PACK, DENIED CP/SOB   • Hyperlipemia    • Hyperlipidemia    • Hypertension    • Hypogonadism in male 01/30/2019   • Nose irritation 02/27/2020   • Nostril sore 02/27/2020   • Thyroid disorder    • Vitamin D deficiency          Current Outpatient Medications:   •  amLODIPine (NORVASC) 5 MG tablet, Take 5 mg by mouth Every Night., Disp: , Rfl:   •  aspirin 81 MG chewable tablet, Chew 81 mg Daily. PT REPORTED TO STOP 1 WEEK PRIOR TO SURGERY, LAST DOSE 10/04/21per Dr. Copeland's instruction, Disp: , Rfl:   •  atorvastatin (LIPITOR) 40 MG tablet, Take 40 mg by mouth Every Night., Disp: , Rfl:   •  cetirizine (zyrTEC) 10 MG tablet, Take 10 mg by mouth Every Morning., Disp: , Rfl:   •  cholecalciferol (Cholecalciferol) 25 MCG (1000 UT) tablet, Take 1,000 Units by mouth Daily., Disp: , Rfl:   •  Coenzyme Q10 100 MG tablet, Take 100 mg by mouth Daily., Disp: , Rfl:   •  Diclofenac Sodium (VOLTAREN) 1 % gel gel, Apply 4 g  topically to the appropriate area as directed 4 (Four) Times a Day As Needed (as needed)., Disp: 150 g, Rfl: 1  •  empagliflozin (Jardiance) 25 MG tablet tablet, Take 25 mg by mouth Every Morning. INSTRUCTED PER ANESTHESIA STANDING ORDERS, Disp: , Rfl:   •  ezetimibe (ZETIA) 10 MG tablet, Take 10 mg by mouth Daily., Disp: , Rfl:   •  fluticasone (FLONASE) 50 MCG/ACT nasal spray, 2 sprays into the nostril(s) as directed by provider Daily As Needed., Disp: , Rfl:   •  glipizide (GLUCOTROL) 10 MG tablet, Take 20 mg by mouth Daily Before Supper. INSTRUCTED PER ANESTHESIA STANDING ORDERS, Disp: , Rfl:   •  HYDROcodone-acetaminophen (Norco) 7.5-325 MG per tablet, Take 1 tablet by mouth Every 4 (Four) Hours As Needed for Moderate Pain ., Disp: 20 tablet, Rfl: 0  •  hydrocortisone 2.5 % cream, , Disp: , Rfl:   •  Ketoconazole 1 % shampoo, Apply  topically to the appropriate area as directed., Disp: , Rfl:   •  levothyroxine (Synthroid) 50 MCG tablet, Take 50 mcg by mouth., Disp: , Rfl:   •  lisinopril-hydrochlorothiazide (PRINZIDE,ZESTORETIC) 10-12.5 MG per tablet, Take 1 tablet by mouth Every Morning. inst per anesthesia protocol, Disp: , Rfl:   •  meloxicam (MOBIC) 15 MG tablet, Take 15 mg by mouth Daily As Needed. inst per anesthesia protocol, Disp: , Rfl:   •  omeprazole (priLOSEC) 20 MG capsule, Take 20 mg by mouth Daily As Needed., Disp: , Rfl:   •  sildenafil (VIAGRA) 100 MG tablet, , Disp: , Rfl:   •  SITagliptin-metFORMIN HCl ER (Janumet XR) 100-1000 MG tablet, Take 1 tablet by mouth Every Night. inst per anesthesia protocol, Disp: , Rfl:   •  Testosterone Cypionate (DEPOTESTOTERONE CYPIONATE) 200 MG/ML injection, Inject 0.88 mL into the appropriate muscle as directed by prescriber Every 14 (Fourteen) Days., Disp: 6 mL, Rfl: 1  •  urea 10 % lotion, Apply  topically to the appropriate area as directed., Disp: , Rfl:     There are no discontinued medications.  Allergies   Allergen Reactions   • Penicillins Itching     " PT REPORTED CAN TAKE KEFLEX NO REACTIONS         Social History     Tobacco Use   • Smoking status: Former Smoker   • Smokeless tobacco: Never Used   • Tobacco comment: 0.5 ppd, smoked 6-10 years   Vaping Use   • Vaping Use: Never used   Substance Use Topics   • Alcohol use: Yes     Comment: occasionally drinks   • Drug use: Never       Family History   Problem Relation Age of Onset   • Hypertension Mother    • Arthritis Mother    • Stroke Father    • Diabetes Father    • Hypertension Father    • Hypertension Sister    • Arthritis Sister    • Hypertension Other    • Diabetes Other    • Malig Hyperthermia Neg Hx         Objective     /74   Pulse 76   Ht 167.6 cm (66\")   Wt 92.1 kg (203 lb)   BMI 32.77 kg/m²       Physical Exam    General Appearance:   · no acute distress  · Alert and oriented x3  HENT:   · lips not cyanotic  · Atraumatic  Neck:  · No jvd   · supple  Respiratory:  · no respiratory distress  · normal breath sounds  · no rales  Cardiovascular:  · Regular rate and rhythm  · no S3, no S4   · 2/6 mid peaking systolic murmur  · no rub  Extremities  · No cyanosis  · lower extremity edema: none    Skin:   · warm, dry  · No rashes    Result Review :     No results found for: PROBNP  CMP    CMP 1/21/21 7/12/21   Glucose 211 (A) 192 (A)   BUN 17 20   Creatinine 1.12 0.83   eGFR Non African Am  94   Sodium 139 132 (A)   Potassium 4.5 4.4   Chloride 99 100   Calcium 9.7 8.8   Albumin 4.6 4.60   Total Bilirubin 0.57 0.6   Alkaline Phosphatase 65 53   AST (SGOT) 19 14   ALT (SGPT) 37 27   (A) Abnormal value            CBC w/diff    CBC w/Diff 1/21/21 7/12/21   WBC 7.91 8.40   RBC 5.73 5.63   Hemoglobin 16.8 16.7   Hematocrit 51.6 49.6   MCV 90.1 88.1   MCH 29.3 29.7   MCHC 32.6 (A) 33.7   RDW 13.8 13.5   Platelets 193 180   Neutrophil Rel % 64.4 70.0   Immature Granulocyte Rel %  0.6 (A)   Lymphocyte Rel % 21.1 17.9 (A)   Monocyte Rel % 10.5 (A) 8.7   Eosinophil Rel % 2.5 2.1   Basophil Rel % 0.9 0.7 "   (A) Abnormal value             Lab Results   Component Value Date    TSH 1.630 03/01/2021      TSH 1.81    Hemoglobin A1c 8.1  HDL 37  LDL 65  Triglycerides 120        No results found for this or any previous visit.     EKG 8/21 shows normal sinus rhythm with left axis deviation and right bundle branch block with left anterior fascicular        The ASCVD Risk score (Garwood CRYSTAL Jr., et al., 2013) failed to calculate for the following reasons:    Cannot find a previous HDL lab    Cannot find a previous total cholesterol lab     Diagnoses and all orders for this visit:    1. Nonrheumatic aortic valve stenosis (Primary)  Overview:  · ECHO 11/19  Images personally present  Concentric LVH  Moderate aortic stenosis peak gradient of 29 mmHg  · Stress echocardiogram 8/21 low probability for ischemia  Normal LV function  Moderate to severe aortic stenosis    Assessment & Plan:  Patient with moderate to severe by recent echocardiogram no overt symptoms recommend repeating echocardiogram in 6 months.    Orders:  -     Adult Transthoracic Echo Complete W/ Cont if Necessary Per Protocol; Future    2. Essential hypertension  Assessment & Plan:  In office mild systolic elevation may be whitecoat related gave patient a log to keep for the next 2 weeks for further review  Counseled patient on  • low-sodium diet of less than 2 g  • Aerobic activity 30 minutes a day 5 times a week  • Weight loss          3. Dyslipidemia  Assessment & Plan:  Patient is on statin and LDL is at goal tolerating well no further titration needed      4. LVH (left ventricular hypertrophy)          Follow Up     No follow-ups on file.          Patient was given instructions and counseling regarding his condition or for health maintenance advice. Please see specific information pulled into the AVS if appropriate.

## 2021-11-18 NOTE — ASSESSMENT & PLAN NOTE
In office mild systolic elevation may be whitecoat related gave patient a log to keep for the next 2 weeks for further review  Counseled patient on  • low-sodium diet of less than 2 g  • Aerobic activity 30 minutes a day 5 times a week  • Weight loss

## 2021-11-22 ENCOUNTER — PATIENT ROUNDING (BHMG ONLY) (OUTPATIENT)
Dept: CARDIOLOGY | Facility: CLINIC | Age: 61
End: 2021-11-22

## 2021-11-22 NOTE — PROGRESS NOTES
November 22, 2021    Hello, may I speak with Osman Holman?    My name is Carmen    I am  with [facility]  [address] [place] [address] [phone].    Before we get started may I verify your date of birth? 1960    I am calling to officially welcome you to our practice and ask about your recent visit. Is this a good time to talk? yes    Tell me about your visit with us. What things went well?  Everything went  Well. Everyone was helpful and kind       We're always looking for ways to make our patients' experiences even better. Do you have recommendations on ways we may improve?  NO    Overall were you satisfied with your first visit to our practice? {YES       I appreciate you taking the time to speak with me today. Is there anything else I can do for you? NO      Thank you, and have a great day.

## 2021-11-23 ENCOUNTER — OFFICE VISIT (OUTPATIENT)
Dept: FAMILY MEDICINE CLINIC | Facility: CLINIC | Age: 61
End: 2021-11-23

## 2021-11-23 VITALS
OXYGEN SATURATION: 95 % | DIASTOLIC BLOOD PRESSURE: 74 MMHG | BODY MASS INDEX: 32.3 KG/M2 | TEMPERATURE: 98.6 F | WEIGHT: 201 LBS | HEART RATE: 87 BPM | SYSTOLIC BLOOD PRESSURE: 142 MMHG | HEIGHT: 66 IN

## 2021-11-23 DIAGNOSIS — R50.9 FEVER, UNSPECIFIED FEVER CAUSE: ICD-10-CM

## 2021-11-23 DIAGNOSIS — R52 BODY ACHES: ICD-10-CM

## 2021-11-23 DIAGNOSIS — R05.9 COUGH: Primary | ICD-10-CM

## 2021-11-23 LAB
EXPIRATION DATE: NORMAL
INTERNAL CONTROL: NORMAL
Lab: NORMAL
SARS-COV-2 AG UPPER RESP QL IA.RAPID: NOT DETECTED

## 2021-11-23 PROCEDURE — 87426 SARSCOV CORONAVIRUS AG IA: CPT | Performed by: FAMILY MEDICINE

## 2021-11-23 PROCEDURE — 99213 OFFICE O/P EST LOW 20 MIN: CPT | Performed by: FAMILY MEDICINE

## 2021-11-23 NOTE — PROGRESS NOTES
Chief Complaint  Cough, Fever, and Nasal Congestion    SUBJECTIVE  Osman Holman presents to [facility]  61-year-old cough fever nasal congestion some malaise for couple days now starting to feel better    PAST MEDICAL HISTORY  Allergies   Allergen Reactions   • Penicillins Itching     PT REPORTED CAN TAKE KEFLEX NO REACTIONS         Past Surgical History:   • CARPAL TUNNEL RELEASE    Procedure: BILATERAL CARPAL TUNNEL RELEASE;  Surgeon: Ab Copeland MD;  Location: [facility] MAIN OR;  Service: Orthopedics;  Laterality: Bilateral;   • CHOLECYSTECTOMY   • COLONOSCOPY       Social History     Tobacco Use   • Smoking status: Former Smoker   • Smokeless tobacco: Never Used   • Tobacco comment: 0.5 ppd, smoked 6-10 years   Substance Use Topics   • Alcohol use: Yes     Comment: occasionally drinks       Family History   Problem Relation Age of Onset   • Hypertension Mother    • Arthritis Mother    • Stroke Father    • Diabetes Father    • Hypertension Father    • Hypertension Sister    • Arthritis Sister    • Hypertension Other    • Diabetes Other    • Malig Hyperthermia Neg Hx         Health Maintenance Due   Topic Date Due   • URINE MICROALBUMIN  Never done   • COLORECTAL CANCER SCREENING  Never done   • ANNUAL PHYSICAL  Never done   • Pneumococcal Vaccine 0-64 (1 of 2 - PPSV23) Never done   • TDAP/TD VACCINES (1 - Tdap) Never done   • ZOSTER VACCINE (1 of 2) Never done   • HEPATITIS C SCREENING  Never done   • DIABETIC FOOT EXAM  Never done   • HEMOGLOBIN A1C  Never done   • LIPID PANEL  Never done      Last Completed Colonoscopy     This patient has no relevant Health Maintenance data.          REVIEW OF SYSTEMS  Respiratory no shortness of breath coughed up some darkish phlegm no chills no fever  Cardiovascular no chest pain no palpitations  ENT nasal congestion    OBJECTIVE  Vitals:    11/23/21 1458   BP: 142/74   Pulse: 87   Temp: 98.6 °F (37 °C)   SpO2: 95%   Weight: 91.2 kg (201  "lb)   Height: 167.6 cm (66\")     Body mass index is 32.44 kg/m².    PHYSICAL EXAM  General no distress  Lungs clear and equal bilaterally  Cardiovascular regular rhythm    Covid and flu swabs are negative    ASSESSMENT & PLAN  Diagnoses and all orders for this visit:    1. Cough (Primary)  -     POCT SARS-CoV-2 Antigen JUANCARLOS    2. Fever, unspecified fever cause  -     POCT SARS-CoV-2 Antigen JUANCARLOS    3. Body aches  -     POCT SARS-CoV-2 Antigen JUANCARLOS      Viral URI reassurance fluids and Tylenol no other medications            Patient was given instructions and counseling regarding his condition or for health maintenance advice. Please see specific information pulled into the AVS if appropriate.   "

## 2022-01-25 ENCOUNTER — TELEPHONE (OUTPATIENT)
Dept: FAMILY MEDICINE CLINIC | Facility: CLINIC | Age: 62
End: 2022-01-25

## 2022-01-25 NOTE — TELEPHONE ENCOUNTER
Spoke with patient and instructed that Dr Noemi Garcia sent the referral to Dr Hirsch for the patient.  We did not send it.  Instructed patient to keep appointment already scheduled with Dr Hirsch.

## 2022-01-25 NOTE — TELEPHONE ENCOUNTER
Caller: Osman Holman    Relationship: Self    Best call back number: [phone]    What is the best time to reach you: ANYTIME     Who are you requesting to speak with (clinical staff, provider,  specific staff member): NURSE OR DR. CALLI VALDERRAMA     What was the call regarding:  PATIENT IS WANTING TO TALK TO SOMEONE ABOUT A REFERRAL THAT HE DOES NOT HAVE A CLUE ABOUT BECAUSE HE DID NOT ASK FOR THIS.  THE REFERRAL WAS TO SEE DR. ANTHONY AND PATIENT IS ALREADY SEEING DR. ANTHONY HE HAS AN APPOINTMENT IN June WITH HIM.  PLEASE CALL TO DISCUSS THIS IN GREATER DETAIL.     Do you require a callback: YES

## 2022-02-02 ENCOUNTER — LAB (OUTPATIENT)
Dept: LAB | Facility: HOSPITAL | Age: 62
End: 2022-02-02

## 2022-02-02 DIAGNOSIS — E29.1 HYPOGONADISM IN MALE: ICD-10-CM

## 2022-02-02 LAB
ALBUMIN SERPL-MCNC: 4.5 G/DL (ref 3.5–5.2)
ALBUMIN/GLOB SERPL: 1.7 G/DL
ALP SERPL-CCNC: 59 U/L (ref 39–117)
ALT SERPL W P-5'-P-CCNC: 28 U/L (ref 1–41)
ANION GAP SERPL CALCULATED.3IONS-SCNC: 11.5 MMOL/L (ref 5–15)
AST SERPL-CCNC: 16 U/L (ref 1–40)
BILIRUB SERPL-MCNC: 0.6 MG/DL (ref 0–1.2)
BUN SERPL-MCNC: 17 MG/DL (ref 8–23)
BUN/CREAT SERPL: 16.2 (ref 7–25)
CALCIUM SPEC-SCNC: 8.9 MG/DL (ref 8.6–10.5)
CHLORIDE SERPL-SCNC: 96 MMOL/L (ref 98–107)
CO2 SERPL-SCNC: 26.5 MMOL/L (ref 22–29)
CREAT SERPL-MCNC: 1.05 MG/DL (ref 0.76–1.27)
DEPRECATED RDW RBC AUTO: 47.8 FL (ref 37–54)
ERYTHROCYTE [DISTWIDTH] IN BLOOD BY AUTOMATED COUNT: 14 % (ref 12.3–15.4)
GFR SERPL CREATININE-BSD FRML MDRD: 71 ML/MIN/1.73
GLOBULIN UR ELPH-MCNC: 2.6 GM/DL
GLUCOSE SERPL-MCNC: 172 MG/DL (ref 65–99)
HCT VFR BLD AUTO: 52.7 % (ref 37.5–51)
HGB BLD-MCNC: 16.6 G/DL (ref 13–17.7)
MCH RBC QN AUTO: 28.8 PG (ref 26.6–33)
MCHC RBC AUTO-ENTMCNC: 31.5 G/DL (ref 31.5–35.7)
MCV RBC AUTO: 91.5 FL (ref 79–97)
PLATELET # BLD AUTO: 176 10*3/MM3 (ref 140–450)
PMV BLD AUTO: 11 FL (ref 6–12)
POTASSIUM SERPL-SCNC: 4.1 MMOL/L (ref 3.5–5.2)
PROT SERPL-MCNC: 7.1 G/DL (ref 6–8.5)
PSA SERPL-MCNC: 0.33 NG/ML (ref 0–4)
RBC # BLD AUTO: 5.76 10*6/MM3 (ref 4.14–5.8)
SODIUM SERPL-SCNC: 134 MMOL/L (ref 136–145)
TESTOST SERPL-MCNC: 316 NG/DL (ref 193–740)
WBC NRBC COR # BLD: 7.77 10*3/MM3 (ref 3.4–10.8)

## 2022-02-02 PROCEDURE — 36415 COLL VENOUS BLD VENIPUNCTURE: CPT

## 2022-02-02 PROCEDURE — 80053 COMPREHEN METABOLIC PANEL: CPT

## 2022-02-02 PROCEDURE — 84403 ASSAY OF TOTAL TESTOSTERONE: CPT

## 2022-02-02 PROCEDURE — 85027 COMPLETE CBC AUTOMATED: CPT

## 2022-02-02 PROCEDURE — 84153 ASSAY OF PSA TOTAL: CPT

## 2022-02-08 ENCOUNTER — OFFICE VISIT (OUTPATIENT)
Dept: UROLOGY | Facility: CLINIC | Age: 62
End: 2022-02-08

## 2022-02-08 VITALS — WEIGHT: 200 LBS | BODY MASS INDEX: 32.14 KG/M2 | HEIGHT: 66 IN | RESPIRATION RATE: 18 BRPM

## 2022-02-08 DIAGNOSIS — E29.1 HYPOGONADISM IN MALE: Primary | ICD-10-CM

## 2022-02-08 PROCEDURE — 99213 OFFICE O/P EST LOW 20 MIN: CPT | Performed by: UROLOGY

## 2022-02-08 RX ORDER — TESTOSTERONE CYPIONATE 200 MG/ML
175 INJECTION, SOLUTION INTRAMUSCULAR
Qty: 6 ML | Refills: 1 | Status: SHIPPED | OUTPATIENT
Start: 2022-02-08 | End: 2022-08-09 | Stop reason: SDUPTHER

## 2022-04-04 ENCOUNTER — TELEPHONE (OUTPATIENT)
Dept: UROLOGY | Facility: CLINIC | Age: 62
End: 2022-04-04

## 2022-04-04 NOTE — TELEPHONE ENCOUNTER
Caller: Vivi Holman    Relationship: Self    Best call back number: [phone]    What is the best time to reach you: ANYTIME BETWEEN 9AM-11AM ON 4/5/2022    Who are you requesting to speak with clinical staff, OR provider    Do you know the name of the person who called: VIVI HOLMAN    What was the call regarding: IF HE CAN USE BLOODWORK [facility]. AND IF IT CAN BE USED CAN HE BRING IT TO YOU FOR FAX IT OVER.     Do you require a callback: YES

## 2022-04-05 ENCOUNTER — TELEPHONE (OUTPATIENT)
Dept: UROLOGY | Facility: CLINIC | Age: 62
End: 2022-04-05

## 2022-04-05 NOTE — TELEPHONE ENCOUNTER
Pt had the 2 month repeat CBC that Dr Reese wanted [facility] through [facility]. He is going to fax it to us. Will you please call him and let him know if the numbers are better or if there is any new plan of care. He is aware that Dr. Reese is out until next week.

## 2022-04-11 ENCOUNTER — TELEPHONE (OUTPATIENT)
Dept: UROLOGY | Facility: CLINIC | Age: 62
End: 2022-04-11

## 2022-04-12 NOTE — TELEPHONE ENCOUNTER
Returned patients call to let him know that is fine that he has already completed the CBC elsewhere, we can use the bloodwork results from PCP. He is faxing those over himself. Also reminded him to have labs done again prior to next appt in August. He verbalized understanding.

## 2022-04-18 ENCOUNTER — TELEPHONE (OUTPATIENT)
Dept: UROLOGY | Facility: CLINIC | Age: 62
End: 2022-04-18

## 2022-04-19 ENCOUNTER — TELEPHONE (OUTPATIENT)
Dept: UROLOGY | Facility: CLINIC | Age: 62
End: 2022-04-19

## 2022-04-19 NOTE — TELEPHONE ENCOUNTER
Spoke with patient, made him aware we have not received labs. He is going to try and refax these or drop them off at the office if he is in town

## 2022-04-19 NOTE — TELEPHONE ENCOUNTER
Caller: VIVI GRUBER    Relationship to patient: SELF     Best call back number: [phone]     Patient is needing: PLEASE CALL PT TO LET HIM KNOW IF [facility]S [facility] AT THE [facility] THAT WERE [facility]ED FROM HIM WERE RECEIVED. PLEASE LET HIM KNOW IF NOT SO HE CAN REFAX THEM OR DROP THEM BY.

## 2022-05-05 ENCOUNTER — TELEPHONE (OUTPATIENT)
Dept: SURGERY | Facility: CLINIC | Age: 62
End: 2022-05-05

## 2022-05-05 NOTE — TELEPHONE ENCOUNTER
Called pt to see if he had blood work done. He stated he did I checked in media and it was scanned in. He wants  nurse to call him and give him confirmation that  did take a look at it. Please call this pt.

## 2022-05-06 NOTE — TELEPHONE ENCOUNTER
Called pt to inform him that everything was fine on his labs per .   Is This A New Presentation, Or A Follow-Up?: Skin Lesion Has Your Skin Lesion Been Treated?: not been treated

## 2022-05-26 ENCOUNTER — OFFICE VISIT (OUTPATIENT)
Dept: FAMILY MEDICINE CLINIC | Facility: CLINIC | Age: 62
End: 2022-05-26

## 2022-05-26 VITALS
HEART RATE: 77 BPM | DIASTOLIC BLOOD PRESSURE: 72 MMHG | WEIGHT: 200 LBS | HEIGHT: 66 IN | SYSTOLIC BLOOD PRESSURE: 134 MMHG | TEMPERATURE: 98.6 F | BODY MASS INDEX: 32.14 KG/M2 | OXYGEN SATURATION: 98 %

## 2022-05-26 DIAGNOSIS — R05.9 COUGH: Primary | ICD-10-CM

## 2022-05-26 PROCEDURE — 87426 SARSCOV CORONAVIRUS AG IA: CPT | Performed by: FAMILY MEDICINE

## 2022-05-26 PROCEDURE — 99213 OFFICE O/P EST LOW 20 MIN: CPT | Performed by: FAMILY MEDICINE

## 2022-05-26 RX ORDER — PREDNISONE 20 MG/1
20 TABLET ORAL DAILY
Qty: 7 TABLET | Refills: 0 | Status: SHIPPED | OUTPATIENT
Start: 2022-05-26 | End: 2022-06-02

## 2022-05-26 NOTE — PROGRESS NOTES
Chief Complaint  Cough (No fever, sx started 3 weeks ago. No loss of taste or smell), URI (/), and Hoarse (Had sore throat in the beginning )    SUBJECTIVE  Osman Holman presents to [facility]    Patient is 62-year-old has diabetes [facility] aortic stenosis followed by Dr. Torrey palacio gonad is him followed by urology had a cough congestion pharyngitis few weeks ago was treated with some prednisone did get better but then about a week ago developed more cough and some congestion did have some wheezing does not usually wheeze no fever.  To go on vacation wants to feel better    PAST MEDICAL HISTORY  Allergies   Allergen Reactions   • Penicillins Itching     PT REPORTED CAN TAKE KEFLEX NO REACTIONS         Past Surgical History:   • CARPAL TUNNEL RELEASE    Procedure: BILATERAL CARPAL TUNNEL RELEASE;  Surgeon: Ab Copeland MD;  Location: [facility] OR;  Service: Orthopedics;  Laterality: Bilateral;   • CHOLECYSTECTOMY   • COLONOSCOPY       Social History     Tobacco Use   • Smoking status: Former Smoker   • Smokeless tobacco: Never Used   • Tobacco comment: 0.5 ppd, smoked 6-10 years   Substance Use Topics   • Alcohol use: Yes     Comment: occasionally drinks       Family History   Problem Relation Age of Onset   • Hypertension Mother    • Arthritis Mother    • Stroke Father    • Diabetes Father    • Hypertension Father    • Hypertension Sister    • Arthritis Sister    • Hypertension Other    • Diabetes Other    • Malig Hyperthermia Neg Hx         Health Maintenance Due   Topic Date Due   • URINE MICROALBUMIN  Never done   • COLORECTAL CANCER SCREENING  Never done   • ANNUAL PHYSICAL  Never done   • Pneumococcal Vaccine 0-64 (1 - PCV) Never done   • TDAP/TD VACCINES (1 - Tdap) Never done   • ZOSTER VACCINE (1 of 2) Never done   • HEPATITIS C SCREENING  Never done   • DIABETIC FOOT EXAM  Never done   • HEMOGLOBIN A1C  Never done   • LIPID PANEL  Never done   • COVID-19  "Vaccine (4 - Booster for Moderna series) 03/18/2022   • DIABETIC EYE EXAM  04/27/2022        Last Completed Colonoscopy     This patient has no relevant Health Maintenance data.          REVIEW OF SYSTEMS    Cardiovascular no chest pain no palpitations or Hirsch follows aortic stenosis  Cough productive of some phlegm no shortness of breath    OBJECTIVE  Vitals:    05/26/22 1122   BP: 134/72   Pulse: 77   Temp: 98.6 °F (37 °C)   SpO2: 98%   Weight: 90.7 kg (200 lb)   Height: 167.6 cm (66\")     Body mass index is 32.28 kg/m².    PHYSICAL EXAM    General no distress  Cardiovascular 2/6 systolic ejection murmur  Respiratory lungs clear and equal bilaterally questionable wheeze on expiration  COVID screen is negative  ASSESSMENT & PLAN  Diagnoses and all orders for this visit:    1. Cough (Primary)  -     POCT SARS-CoV-2 Antigen JUANCARLOS    Other orders  -     predniSONE (DELTASONE) 20 MG tablet; Take 1 tablet by mouth Daily for 7 days.  Dispense: 7 tablet; Refill: 0          A viral URI COVID test is negative some questionably wheezing we will give Trelegy and prednisone trilogy only for 1 month prednisone for 7 days            Patient was given instructions and counseling regarding his condition or for health maintenance advice. Please see specific information pulled into the AVS if appropriate.   "

## 2022-06-02 ENCOUNTER — OFFICE VISIT (OUTPATIENT)
Dept: CARDIOLOGY | Facility: CLINIC | Age: 62
End: 2022-06-02

## 2022-06-02 VITALS
DIASTOLIC BLOOD PRESSURE: 74 MMHG | SYSTOLIC BLOOD PRESSURE: 134 MMHG | HEIGHT: 66 IN | RESPIRATION RATE: 16 BRPM | WEIGHT: 196 LBS | BODY MASS INDEX: 31.5 KG/M2 | HEART RATE: 70 BPM

## 2022-06-02 DIAGNOSIS — I51.7 LVH (LEFT VENTRICULAR HYPERTROPHY): ICD-10-CM

## 2022-06-02 DIAGNOSIS — I35.0 AORTIC STENOSIS, SEVERE: Primary | ICD-10-CM

## 2022-06-02 DIAGNOSIS — I10 ESSENTIAL HYPERTENSION: ICD-10-CM

## 2022-06-02 DIAGNOSIS — E78.5 DYSLIPIDEMIA: ICD-10-CM

## 2022-06-02 PROCEDURE — 99213 OFFICE O/P EST LOW 20 MIN: CPT | Performed by: INTERNAL MEDICINE

## 2022-06-02 NOTE — ASSESSMENT & PLAN NOTE
Does appear to be just at the criteria for severe based on gradients patient though has not been experiencing any symptomatic problems at this point cautioned him on shortness of breath chest pain or syncope and if patient develops this to call back immediately we will have him follow back up in 6 months would recommend avoiding strenuous activity until

## 2022-06-02 NOTE — PROGRESS NOTES
Chief Complaint  Follow-up    Subjective    Patient is doing very well symptomatically he has not been experiencing any problems with chest pain shortness of breath exertion or syncope or presyncopal episodes since last visit    Past Medical History:   Diagnosis Date   • Allergic rhinitis    • Aortic stenosis     ASYPTOMATIC- SEE'S DR. PACK, DENIED CP/SOB   • Arthritis    • Carpal tunnel syndrome on both sides CURRENTLY   • Dermatitis    • Diabetes (HCC)     TYPE II, BG RUNS UNDER 200 IN AM   • Diabetes mellitus type 2 with complications, uncontrolled 01/30/2019   • ED (erectile dysfunction)    • Essential hypertension    • GERD (gastroesophageal reflux disease)    • Heart murmur     ASYMPTOMATIC- SEE'S DR PACK, DENIED CP/SOB   • Hyperlipemia    • Hyperlipidemia    • Hypertension    • Hypogonadism in male 01/30/2019   • Nose irritation 02/27/2020   • Nostril sore 02/27/2020   • Thyroid disorder    • Vitamin D deficiency          Current Outpatient Medications:   •  amLODIPine (NORVASC) 5 MG tablet, Take 5 mg by mouth Every Night., Disp: , Rfl:   •  aspirin 81 MG chewable tablet, Chew 81 mg Daily. PT REPORTED TO STOP 1 WEEK PRIOR TO SURGERY, LAST DOSE 10/04/21per Dr. Copeland's instruction, Disp: , Rfl:   •  atorvastatin (LIPITOR) 40 MG tablet, Take 40 mg by mouth Every Night., Disp: , Rfl:   •  cetirizine (zyrTEC) 10 MG tablet, Take 10 mg by mouth Every Morning., Disp: , Rfl:   •  cholecalciferol (VITAMIN D3) 25 MCG (1000 UT) tablet, Take 1,000 Units by mouth Daily., Disp: , Rfl:   •  Coenzyme Q10 100 MG tablet, Take 100 mg by mouth Daily., Disp: , Rfl:   •  Diclofenac Sodium (VOLTAREN) 1 % gel gel, Apply 4 g topically to the appropriate area as directed 4 (Four) Times a Day As Needed (as needed)., Disp: 150 g, Rfl: 1  •  empagliflozin (JARDIANCE) 25 MG tablet tablet, Take 25 mg by mouth Every Morning. INSTRUCTED PER ANESTHESIA STANDING ORDERS, Disp: , Rfl:   •  ezetimibe (ZETIA) 10 MG tablet, Take 10 mg by mouth  Daily., Disp: , Rfl:   •  fluticasone (FLONASE) 50 MCG/ACT nasal spray, 2 sprays into the nostril(s) as directed by provider Daily As Needed., Disp: , Rfl:   •  glipizide (GLUCOTROL) 10 MG tablet, Take 20 mg by mouth Daily Before Supper. INSTRUCTED PER ANESTHESIA STANDING ORDERS, Disp: , Rfl:   •  hydrocortisone 2.5 % cream, , Disp: , Rfl:   •  Ketoconazole 1 % shampoo, Apply  topically to the appropriate area as directed., Disp: , Rfl:   •  levothyroxine (SYNTHROID, LEVOTHROID) 50 MCG tablet, Take 50 mcg by mouth., Disp: , Rfl:   •  lisinopril-hydrochlorothiazide (PRINZIDE,ZESTORETIC) 10-12.5 MG per tablet, Take 1 tablet by mouth Every Morning. inst per anesthesia protocol, Disp: , Rfl:   •  meloxicam (MOBIC) 15 MG tablet, Take 15 mg by mouth Daily As Needed. inst per anesthesia protocol, Disp: , Rfl:   •  omeprazole (priLOSEC) 20 MG capsule, Take 20 mg by mouth Daily As Needed., Disp: , Rfl:   •  sildenafil (VIAGRA) 100 MG tablet, , Disp: , Rfl:   •  SITagliptin-metFORMIN HCl ER (Janumet XR) 100-1000 MG tablet, Take 1 tablet by mouth Every Night. inst per anesthesia protocol, Disp: , Rfl:   •  Testosterone Cypionate (DEPOTESTOTERONE CYPIONATE) 200 MG/ML injection, Inject 0.88 mL into the appropriate muscle as directed by prescriber Every 14 (Fourteen) Days., Disp: 6 mL, Rfl: 1  •  predniSONE (DELTASONE) 20 MG tablet, Take 1 tablet by mouth Daily for 7 days., Disp: 7 tablet, Rfl: 0    There are no discontinued medications.  Allergies   Allergen Reactions   • Penicillins Itching     PT REPORTED CAN TAKE KEFLEX NO REACTIONS         Social History     Tobacco Use   • Smoking status: Former Smoker   • Smokeless tobacco: Never Used   • Tobacco comment: 0.5 ppd, smoked 6-10 years   Vaping Use   • Vaping Use: Never used   Substance Use Topics   • Alcohol use: Yes     Comment: occasionally drinks   • Drug use: Never       Family History   Problem Relation Age of Onset   • Hypertension Mother    • Arthritis Mother    •  "Stroke Father    • Diabetes Father    • Hypertension Father    • Hypertension Sister    • Arthritis Sister    • Hypertension Other    • Diabetes Other    • Malig Hyperthermia Neg Hx         Objective     /74   Pulse 70   Resp 16   Ht 167.6 cm (66\")   Wt 88.9 kg (196 lb)   BMI 31.64 kg/m²       Physical Exam    General Appearance:   · no acute distress  · Alert and oriented x3  HENT:   · lips not cyanotic  · Atraumatic  Neck:  · No jvd   · supple  Respiratory:  · no respiratory distress  · normal breath sounds  · no rales  Cardiovascular:  · Regular rate and rhythm  · no S3, no S4   · 2/6 mid-to-late peaking systolic murmur  · no rub  Extremities  · No cyanosis  · lower extremity edema: none    Skin:   · warm, dry  · No rashes      Result Review :     No results found for: PROBNP  CMP    CMP 7/12/21 2/2/22   Glucose 192 (A) 172 (A)   BUN 20 17   Creatinine 0.83 1.05   eGFR Non African Am 94 71   Sodium 132 (A) 134 (A)   Potassium 4.4 4.1   Chloride 100 96 (A)   Calcium 8.8 8.9   Albumin 4.60 4.50   Total Bilirubin 0.6 0.6   Alkaline Phosphatase 53 59   AST (SGOT) 14 16   ALT (SGPT) 27 28   (A) Abnormal value            CBC w/diff    CBC w/Diff 7/12/21 2/2/22   WBC 8.40 7.77   RBC 5.63 5.76   Hemoglobin 16.7 16.6   Hematocrit 49.6 52.7 (A)   MCV 88.1 91.5   MCH 29.7 28.8   MCHC 33.7 31.5   RDW 13.5 14.0   Platelets 180 176   Neutrophil Rel % 70.0    Immature Granulocyte Rel % 0.6 (A)    Lymphocyte Rel % 17.9 (A)    Monocyte Rel % 8.7    Eosinophil Rel % 2.1    Basophil Rel % 0.7    (A) Abnormal value             Lab Results   Component Value Date    TSH 1.630 03/01/2021      No results found for: FREET4   No results found for: DDIMERQUANT  No results found for: MG   No results found for: DIGOXIN   No results found for: TROPONINT          No results found for: POCTROP                   Diagnoses and all orders for this visit:    1. Aortic stenosis, severe (Primary)  Assessment & Plan:  Does appear to be just " at the criteria for severe based on gradients patient though has not been experiencing any symptomatic problems at this point cautioned him on shortness of breath chest pain or syncope and if patient develops this to call back immediately we will have him follow back up in 6 months would recommend avoiding strenuous activity until      2. Essential hypertension  Assessment & Plan:  Well-controlled continue on current antihypertensive medications      3. Dyslipidemia    4. LVH (left ventricular hypertrophy)          Follow Up     Return in about 6 months (around 12/2/2022).          Patient was given instructions and counseling regarding his condition or for health maintenance advice. Please see specific information pulled into the AVS if appropriate.

## 2022-06-09 ENCOUNTER — TELEPHONE (OUTPATIENT)
Dept: UROLOGY | Facility: CLINIC | Age: 62
End: 2022-06-09

## 2022-06-09 NOTE — TELEPHONE ENCOUNTER
Called patient to let him know I have completed the PA request with the updated labs. He verbalized understanding.

## 2022-07-05 ENCOUNTER — TELEPHONE (OUTPATIENT)
Dept: SURGERY | Facility: CLINIC | Age: 62
End: 2022-07-05

## 2022-07-05 NOTE — TELEPHONE ENCOUNTER
Patient is calling wanting to know if his testosterone medication has been precerted? He states he has been getting letters from [facility] about needing someone to call them to fill the testosterone medication for the patient. #[phone].

## 2022-07-05 NOTE — TELEPHONE ENCOUNTER
Received updated PA request from [facility]. Completed PA request and information sent to plan.

## 2022-07-05 NOTE — TELEPHONE ENCOUNTER
Called patient to let him know I have received an approval from insurance plan for the Testosterone. He verbalized understanding

## 2022-07-19 ENCOUNTER — LAB (OUTPATIENT)
Dept: LAB | Facility: HOSPITAL | Age: 62
End: 2022-07-19

## 2022-07-19 DIAGNOSIS — G56.03 BILATERAL CARPAL TUNNEL SYNDROME: ICD-10-CM

## 2022-07-19 DIAGNOSIS — E29.1 HYPOGONADISM IN MALE: ICD-10-CM

## 2022-07-19 LAB
ALBUMIN SERPL-MCNC: 4.3 G/DL (ref 3.5–5.2)
ALBUMIN/GLOB SERPL: 1.7 G/DL
ALP SERPL-CCNC: 47 U/L (ref 39–117)
ALT SERPL W P-5'-P-CCNC: 22 U/L (ref 1–41)
ANION GAP SERPL CALCULATED.3IONS-SCNC: 14.3 MMOL/L (ref 5–15)
AST SERPL-CCNC: 18 U/L (ref 1–40)
BASOPHILS # BLD AUTO: 0.05 10*3/MM3 (ref 0–0.2)
BASOPHILS NFR BLD AUTO: 0.6 % (ref 0–1.5)
BILIRUB SERPL-MCNC: 0.6 MG/DL (ref 0–1.2)
BILIRUB UR QL STRIP: NEGATIVE
BUN SERPL-MCNC: 15 MG/DL (ref 8–23)
BUN/CREAT SERPL: 15.6 (ref 7–25)
CALCIUM SPEC-SCNC: 8.8 MG/DL (ref 8.6–10.5)
CHLORIDE SERPL-SCNC: 98 MMOL/L (ref 98–107)
CLARITY UR: CLEAR
CO2 SERPL-SCNC: 23.7 MMOL/L (ref 22–29)
COLOR UR: YELLOW
CREAT SERPL-MCNC: 0.96 MG/DL (ref 0.76–1.27)
DEPRECATED RDW RBC AUTO: 44.9 FL (ref 37–54)
EGFRCR SERPLBLD CKD-EPI 2021: 88.3 ML/MIN/1.73
EOSINOPHIL # BLD AUTO: 0.17 10*3/MM3 (ref 0–0.4)
EOSINOPHIL NFR BLD AUTO: 1.9 % (ref 0.3–6.2)
ERYTHROCYTE [DISTWIDTH] IN BLOOD BY AUTOMATED COUNT: 14.3 % (ref 12.3–15.4)
GLOBULIN UR ELPH-MCNC: 2.6 GM/DL
GLUCOSE SERPL-MCNC: 156 MG/DL (ref 65–99)
GLUCOSE UR STRIP-MCNC: ABNORMAL MG/DL
HCT VFR BLD AUTO: 45.4 % (ref 37.5–51)
HGB BLD-MCNC: 15.3 G/DL (ref 13–17.7)
HGB UR QL STRIP.AUTO: NEGATIVE
IMM GRANULOCYTES # BLD AUTO: 0.04 10*3/MM3 (ref 0–0.05)
IMM GRANULOCYTES NFR BLD AUTO: 0.4 % (ref 0–0.5)
KETONES UR QL STRIP: NEGATIVE
LEUKOCYTE ESTERASE UR QL STRIP.AUTO: NEGATIVE
LYMPHOCYTES # BLD AUTO: 1.72 10*3/MM3 (ref 0.7–3.1)
LYMPHOCYTES NFR BLD AUTO: 19.1 % (ref 19.6–45.3)
MCH RBC QN AUTO: 29.3 PG (ref 26.6–33)
MCHC RBC AUTO-ENTMCNC: 33.7 G/DL (ref 31.5–35.7)
MCV RBC AUTO: 86.8 FL (ref 79–97)
MONOCYTES # BLD AUTO: 0.88 10*3/MM3 (ref 0.1–0.9)
MONOCYTES NFR BLD AUTO: 9.8 % (ref 5–12)
NEUTROPHILS NFR BLD AUTO: 6.13 10*3/MM3 (ref 1.7–7)
NEUTROPHILS NFR BLD AUTO: 68.2 % (ref 42.7–76)
NITRITE UR QL STRIP: NEGATIVE
NRBC BLD AUTO-RTO: 0 /100 WBC (ref 0–0.2)
PH UR STRIP.AUTO: 6.5 [PH] (ref 5–8)
PLATELET # BLD AUTO: 194 10*3/MM3 (ref 140–450)
PMV BLD AUTO: 11 FL (ref 6–12)
POTASSIUM SERPL-SCNC: 4.5 MMOL/L (ref 3.5–5.2)
PROT SERPL-MCNC: 6.9 G/DL (ref 6–8.5)
PROT UR QL STRIP: NEGATIVE
RBC # BLD AUTO: 5.23 10*6/MM3 (ref 4.14–5.8)
SODIUM SERPL-SCNC: 136 MMOL/L (ref 136–145)
SP GR UR STRIP: >=1.03 (ref 1–1.03)
TESTOST SERPL-MCNC: 382 NG/DL (ref 193–740)
UROBILINOGEN UR QL STRIP: ABNORMAL
WBC NRBC COR # BLD: 8.99 10*3/MM3 (ref 3.4–10.8)

## 2022-07-19 PROCEDURE — 80053 COMPREHEN METABOLIC PANEL: CPT

## 2022-07-19 PROCEDURE — 84403 ASSAY OF TOTAL TESTOSTERONE: CPT

## 2022-07-19 PROCEDURE — 85025 COMPLETE CBC W/AUTO DIFF WBC: CPT

## 2022-07-19 PROCEDURE — 36415 COLL VENOUS BLD VENIPUNCTURE: CPT

## 2022-07-19 PROCEDURE — 81003 URINALYSIS AUTO W/O SCOPE: CPT

## 2022-07-21 ENCOUNTER — TELEPHONE (OUTPATIENT)
Dept: SURGERY | Facility: CLINIC | Age: 62
End: 2022-07-21

## 2022-07-21 ENCOUNTER — TRANSCRIBE ORDERS (OUTPATIENT)
Dept: LAB | Facility: HOSPITAL | Age: 62
End: 2022-07-21

## 2022-07-21 DIAGNOSIS — E11.9 DIABETES MELLITUS WITHOUT COMPLICATION: Primary | ICD-10-CM

## 2022-07-21 NOTE — TELEPHONE ENCOUNTER
Patient wants the nurse to call him in regards to the labs for his upcoming appointment.  He said there was an issue with the labs and he wants to explain directly to nurse.

## 2022-07-21 NOTE — TELEPHONE ENCOUNTER
Patient came into the office and I looked up his labs, the lab did collect all Reese ordered 07/19. We do not need anything else prior to his appt. He verbalized understanding.

## 2022-07-22 ENCOUNTER — LAB (OUTPATIENT)
Dept: LAB | Facility: HOSPITAL | Age: 62
End: 2022-07-22

## 2022-07-22 ENCOUNTER — TRANSCRIBE ORDERS (OUTPATIENT)
Dept: LAB | Facility: HOSPITAL | Age: 62
End: 2022-07-22

## 2022-07-22 DIAGNOSIS — E11.9 DIABETES MELLITUS WITHOUT COMPLICATION: ICD-10-CM

## 2022-07-22 DIAGNOSIS — E11.9 DIABETES MELLITUS WITHOUT COMPLICATION: Primary | ICD-10-CM

## 2022-07-22 LAB — HBA1C MFR BLD: 8.3 % (ref 4.8–5.6)

## 2022-07-22 PROCEDURE — 36415 COLL VENOUS BLD VENIPUNCTURE: CPT

## 2022-07-22 PROCEDURE — 83036 HEMOGLOBIN GLYCOSYLATED A1C: CPT

## 2022-08-01 ENCOUNTER — OFFICE VISIT (OUTPATIENT)
Dept: FAMILY MEDICINE CLINIC | Facility: CLINIC | Age: 62
End: 2022-08-01

## 2022-08-01 VITALS
DIASTOLIC BLOOD PRESSURE: 76 MMHG | TEMPERATURE: 97.7 F | WEIGHT: 200 LBS | HEART RATE: 90 BPM | HEIGHT: 66 IN | BODY MASS INDEX: 32.14 KG/M2 | SYSTOLIC BLOOD PRESSURE: 132 MMHG | OXYGEN SATURATION: 95 %

## 2022-08-01 DIAGNOSIS — S60.351A FOREIGN BODY OF RIGHT THUMB, INITIAL ENCOUNTER: ICD-10-CM

## 2022-08-01 DIAGNOSIS — R22.0 LEFT FACIAL SWELLING: Primary | ICD-10-CM

## 2022-08-01 PROBLEM — R01.1 HEART MURMUR: Status: ACTIVE | Noted: 2022-08-01

## 2022-08-01 PROBLEM — E78.5 HYPERLIPEMIA: Status: ACTIVE | Noted: 2022-08-01

## 2022-08-01 PROBLEM — E03.9 HYPOTHYROIDISM: Status: ACTIVE | Noted: 2022-08-01

## 2022-08-01 PROBLEM — J30.9 ALLERGIC RHINITIS: Status: ACTIVE | Noted: 2022-08-01

## 2022-08-01 PROBLEM — K21.9 GERD (GASTROESOPHAGEAL REFLUX DISEASE): Status: ACTIVE | Noted: 2022-08-01

## 2022-08-01 PROCEDURE — 10120 INC&RMVL FB SUBQ TISS SMPL: CPT | Performed by: FAMILY MEDICINE

## 2022-08-01 PROCEDURE — 99213 OFFICE O/P EST LOW 20 MIN: CPT | Performed by: FAMILY MEDICINE

## 2022-08-01 NOTE — PROGRESS NOTES
"Chief Complaint  Facial Swelling (x1W - Left cheek area, pt tried applied warm compress) and Thumb Issue (xSeveral weeks - Rt thumb bump \"really really sore, like a splinter\")    SUBJECTIVE  Osman Holman presents to [facility]    Patient is 62 has aortic stenosis hypertension diabetes had a boil this resolved on his left face after warm compresses but also on his right thumb has feeling of a foreign body in it in the anger is sore when he  has been working with the shavings of metal    PAST MEDICAL HISTORY  Allergies   Allergen Reactions   • Penicillins Itching     PT REPORTED CAN TAKE KEFLEX NO REACTIONS         Past Surgical History:   • CARPAL TUNNEL RELEASE    Procedure: BILATERAL CARPAL TUNNEL RELEASE;  Surgeon: Ab Copeland MD;  Location: [facility] MAIN OR;  Service: Orthopedics;  Laterality: Bilateral;   • CHOLECYSTECTOMY   • COLONOSCOPY       Social History     Tobacco Use   • Smoking status: Former Smoker     Packs/day: 0.50   • Smokeless tobacco: Never Used   • Tobacco comment: 0.5 ppd, smoked 6-10 years   Substance Use Topics   • Alcohol use: Yes     Comment: occasionally drinks       Family History   Problem Relation Age of Onset   • Hypertension Mother    • Arthritis Mother    • Stroke Father    • Diabetes Father    • Hypertension Father    • Hypertension Sister    • Arthritis Sister    • Hypertension Other    • Diabetes Other    • Malig Hyperthermia Neg Hx         Health Maintenance Due   Topic Date Due   • URINE MICROALBUMIN  Never done   • COLORECTAL CANCER SCREENING  Never done   • ANNUAL PHYSICAL  Never done   • Pneumococcal Vaccine 0-64 (1 - PCV) Never done   • TDAP/TD VACCINES (1 - Tdap) Never done   • ZOSTER VACCINE (1 of 2) Never done   • HEPATITIS C SCREENING  Never done   • DIABETIC FOOT EXAM  Never done   • LIPID PANEL  Never done   • COVID-19 Vaccine (4 - Booster for Moderna series) 03/18/2022   • DIABETIC EYE EXAM  04/27/2022        Last Completed " "Colonoscopy     This patient has no relevant Health Maintenance data.          REVIEW OF SYSTEMS    Musculoskeletal pain in the right thumb when he  a small area that is dark in the midline tender foreign body sensation when he pushes on it lesion on the face is totally resolved    OBJECTIVE  Vitals:    08/01/22 1326   BP: 132/76   BP Location: Right arm   Patient Position: Sitting   Cuff Size: Adult   Pulse: 90   Temp: 97.7 °F (36.5 °C)   TempSrc: Oral   SpO2: 95%   Weight: 90.7 kg (200 lb)   Height: 167.6 cm (66\")     Body mass index is 32.28 kg/m².    PHYSICAL EXAM    General no distress  Cardiovascular 2/6 systolic ejection murmur has severe aortic stenosis may be looking toward a new aortic valve suggested Dr. Prieto  Lungs clear and equal bilaterally  Abdomen soft nontender no hepatosplenomegaly  Skin lesion thumb on a 0.5 cm dark in the middle some lichenification around it very well may have a foreign body  Procedure after careful chlorhexidine prep 1% Xylocaine anesthesia patient had an I&D of the lesion of the thumb with a small metal shaving was removed it was explored for large urine or other things and nothing else was found other than the one small metal shaving it was quite close to the surface patient was instructed to use Vaseline soap and water daily and try to keep the wound clean turn if signs of infection    ASSESSMENT & PLAN  Diagnoses and all orders for this visit:    1. Left facial swelling (Primary)          Lesion thumb foreign body removal with incision boil left face resolved severe aortic stenosis consider Dr. Prieto if cardiologist thinks a cardiothoracic surgeon is needed    Foreign Body Removal    Date/Time: 8/1/2022 2:00 PM  Performed by: Carlos A Gibson III, MD  Authorized by: Carlos A Gibson III, MD   Body area: skin  General location: upper extremity  Location details: right thumb  Anesthesia: local infiltration    Anesthesia:  Local Anesthetic: lidocaine 1% without " epinephrine  Anesthetic total: 2 mL    Sedation:  Patient sedated: no    Patient restrained: no  Patient cooperative: yes  Removal mechanism: forceps  Dressing: dressing applied  Tendon involvement: none  Depth: subcutaneous  Complexity: simple  1 objects recovered.  Objects recovered: metal shaving  Post-procedure assessment: foreign body removed  Patient tolerance: patient tolerated the procedure well with no immediate complications           Patient was given instructions and counseling regarding his condition or for health maintenance advice. Please see specific information pulled into the AVS if appropriate.

## 2022-08-07 NOTE — PROGRESS NOTES
Chief Complaint    Urologic complaint    Subjective          Osman Holman presents to [facility]  History of Present Illness       61-year-old  gentleman          Hypogonadism.      Energy level and libido ok    Voiding okay.  Good stream.    No GH/UTI     No straining.  No burning/dysuria.    Still  on Depo-Testosterone 175 mg (0.8ml)every 2 weeks IM at home. Refilled today      Patient did give blood about a month ago.      7/22     0.9, GFR  88, H/H    15/45          Previous    Does not want a NP for rectal exam.  We did discuss risk and benefits of rectal exams at this time and unless we see increasing PSA will likely do no further rectal exam.    Was on Depo testosterone testosterone injections 200 mg IM every 2 weeks.  Patient is followed by endocrinology secondary to his diabetes.  She found that his H&H was elevated and had him decrease his testosterone down to 175 mg (0.8 ml) every 2 weeks.  He has been doing okay on this and on repeat H/H he is back within normal limits.  No real changes to his fatigue or libido.      f/u Today with labs    2/22   H/H 16/52.7  high normal, hematocrit 52 with normal being 51%.  7/21 creatinine 0.8, GFR 94, H/H  16/49 1/21 creatinine 1.1, > 60, H/H 16/51      Total testosterone      7/22    382   2/22    316     7/21     93  1/21    251  1/20    400  11/18  393  11/17  504        Previous    Patient's main complaint initially was decreased energy and decreased libido.    No side effects or problems with his injections.    No history of kidney stone.    No urologic family history,   Has never had any urologic surgery.    No cardiopulmonary history.  Patient does not smoke.  Patient does not use blood thinner.      PSA    2/22     0.32     1/21     0.44  1/20     0.41  11/18   0.48  11/17   0.46  11/13   0.47      Past History:  Medical History: has a past medical history of Allergic rhinitis, Aortic stenosis, Arthritis, Carpal tunnel  syndrome on both sides (CURRENTLY), Dermatitis, Diabetes (HCC), Diabetes mellitus type 2 with complications, uncontrolled (01/30/2019), ED (erectile dysfunction), Essential hypertension, GERD (gastroesophageal reflux disease), Heart murmur, Hyperlipemia, Hyperlipidemia, Hypertension, Hypogonadism in male (01/30/2019), Nose irritation (02/27/2020), Nostril sore (02/27/2020), Thyroid disorder, and Vitamin D deficiency.   Surgical History: has a past surgical history that includes Cholecystectomy; Colonoscopy; and Carpal tunnel release (Bilateral, 10/12/2021).   Family History: family history includes Arthritis in his mother and sister; Diabetes in his father and another family member; Hypertension in his father, mother, sister, and another family member; Stroke in his father.   Social History: reports that he has quit smoking. He smoked 0.50 packs per day. He has never used smokeless tobacco. He reports current alcohol use. He reports that he does not use drugs.  Allergies: Penicillins       Current Outpatient Medications:   •  amLODIPine (NORVASC) 5 MG tablet, Take 5 mg by mouth Every Night., Disp: , Rfl:   •  aspirin 81 MG chewable tablet, Chew 81 mg Daily. PT REPORTED TO STOP 1 WEEK PRIOR TO SURGERY, LAST DOSE 10/04/21per Dr. Copeland's instruction, Disp: , Rfl:   •  atorvastatin (LIPITOR) 40 MG tablet, Take 40 mg by mouth Every Night., Disp: , Rfl:   •  cetirizine (zyrTEC) 10 MG tablet, Take 10 mg by mouth Every Morning., Disp: , Rfl:   •  cholecalciferol (VITAMIN D3) 25 MCG (1000 UT) tablet, Take 1,000 Units by mouth Daily., Disp: , Rfl:   •  Coenzyme Q10 100 MG tablet, Take 100 mg by mouth Daily., Disp: , Rfl:   •  empagliflozin (JARDIANCE) 25 MG tablet tablet, Take 25 mg by mouth Every Morning. INSTRUCTED PER ANESTHESIA STANDING ORDERS, Disp: , Rfl:   •  ezetimibe (ZETIA) 10 MG tablet, Take 10 mg by mouth Daily., Disp: , Rfl:   •  fluticasone (FLONASE) 50 MCG/ACT nasal spray, 2 sprays into the nostril(s) as  directed by provider Daily As Needed., Disp: , Rfl:   •  glipizide (GLUCOTROL) 10 MG tablet, Take 20 mg by mouth Daily Before Supper. INSTRUCTED PER ANESTHESIA STANDING ORDERS, Disp: , Rfl:   •  hydrocortisone 2.5 % cream, , Disp: , Rfl:   •  Ketoconazole 1 % shampoo, Apply  topically to the appropriate area as directed., Disp: , Rfl:   •  levothyroxine (SYNTHROID, LEVOTHROID) 50 MCG tablet, Take 50 mcg by mouth., Disp: , Rfl:   •  lisinopril-hydrochlorothiazide (PRINZIDE,ZESTORETIC) 10-12.5 MG per tablet, Take 1 tablet by mouth Every Morning. inst per anesthesia protocol, Disp: , Rfl:   •  meloxicam (MOBIC) 15 MG tablet, Take 15 mg by mouth Daily As Needed. inst per anesthesia protocol, Disp: , Rfl:   •  omeprazole (priLOSEC) 20 MG capsule, Take 20 mg by mouth Daily As Needed., Disp: , Rfl:   •  sildenafil (VIAGRA) 100 MG tablet, , Disp: , Rfl:   •  SITagliptin-metFORMIN HCl ER (Janumet XR) 100-1000 MG tablet, Take 1 tablet by mouth Every Night. inst per anesthesia protocol, Disp: , Rfl:   •  Testosterone Cypionate (DEPOTESTOTERONE CYPIONATE) 200 MG/ML injection, Inject 0.88 mL into the appropriate muscle as directed by prescriber Every 14 (Fourteen) Days., Disp: 6 mL, Rfl: 1     Physical exam       Alert and orient x3  Well appearing, well developed, in no acute distress   Unlabored respirations           Objective     Vital Signs:   There were no vitals taken for this visit.             Assessment and Plan    Diagnoses and all orders for this visit:    1. Hypogonadism in male (Primary)        continue Depo-Testosterone 175 mg (0.8ml)  every 2 weeks IM at home. Refilled today    Patient is using 1 mL vials and wasting the rest      Follow-up in 6 months with a CMP, CBC, PSA and total testosterone

## 2022-08-09 ENCOUNTER — OFFICE VISIT (OUTPATIENT)
Dept: UROLOGY | Facility: CLINIC | Age: 62
End: 2022-08-09

## 2022-08-09 VITALS — RESPIRATION RATE: 17 BRPM | BODY MASS INDEX: 32.14 KG/M2 | WEIGHT: 200 LBS | HEIGHT: 66 IN

## 2022-08-09 DIAGNOSIS — E29.1 HYPOGONADISM IN MALE: Primary | ICD-10-CM

## 2022-08-09 DIAGNOSIS — Z12.5 PROSTATE CANCER SCREENING: ICD-10-CM

## 2022-08-09 PROCEDURE — 99214 OFFICE O/P EST MOD 30 MIN: CPT | Performed by: UROLOGY

## 2022-08-09 RX ORDER — TESTOSTERONE CYPIONATE 200 MG/ML
175 INJECTION, SOLUTION INTRAMUSCULAR
Qty: 6 ML | Refills: 1 | Status: SHIPPED | OUTPATIENT
Start: 2022-08-09 | End: 2023-02-07 | Stop reason: SDUPTHER

## 2022-12-05 ENCOUNTER — TELEPHONE (OUTPATIENT)
Dept: FAMILY MEDICINE CLINIC | Facility: CLINIC | Age: 62
End: 2022-12-05

## 2022-12-05 NOTE — TELEPHONE ENCOUNTER
Caller: Osman Holman    Relationship: Self    Best call back number: [phone]    What is the best time to reach you: ANYTIME     Who are you requesting to speak with (clinical staff, provider,  specific staff member): NURSE     What was the call regarding: PATIENT IS CALLING WANTING TO SEE IF IT WOULD BE O.K. FOR HIM TO TAKE THIS MEDICATION (MOLNUPIRAVIR) HE HAS BEEN TOLD BY  THAT HE HAS COVID.     Do you require a callback: YES

## 2022-12-07 NOTE — TELEPHONE ENCOUNTER
Spoke with patient. Since he did not hear anything back within the same day, he threw away the Molnupiravir. I did tell him that if his symptoms persist or worsen that he can schedule an appointment with another provider (since Dr Gibson) is out of the office until 01/09/2022.

## 2022-12-08 ENCOUNTER — TELEPHONE (OUTPATIENT)
Dept: FAMILY MEDICINE CLINIC | Facility: CLINIC | Age: 62
End: 2022-12-08

## 2022-12-08 NOTE — TELEPHONE ENCOUNTER
left msg that oriana had told him to take all medications to the pharmacy, to make sure no interactions between meds

## 2022-12-13 ENCOUNTER — TELEPHONE (OUTPATIENT)
Dept: FAMILY MEDICINE CLINIC | Facility: CLINIC | Age: 62
End: 2022-12-13

## 2022-12-13 NOTE — TELEPHONE ENCOUNTER
Caller: Osman Holman    Relationship: Self    Best call back number: [phone]          What are your current symptoms:     THICK YELLOW MUCUS, COUGHING     How long have you been experiencing symptoms: OVER A WEEK     Have you had these symptoms before:    [] Yes  [x] No    Have you been treated for these symptoms before:   [] Yes  [x] No    If a prescription is needed, what is your preferred pharmacy and phone number:      [facility] - [place], [place] - [address] - [phone]  - [phone]   [phone]      Additional notes:    THE PATIENT IS SAID HE WAS DIAGNOSED WITH COVID AT [facility] FIRST OVER A WEEK AGO AND IS STILL HAVING LINGERING SYMPTOMS.  HE IS WANTING TO KNOW IF PCP WOULD SEND MEDICATION OVER TO THE PHARMACY. PATIENT WOULD LIKE A CALL BACK TO DISCUSS ASAP       PLEASE ADVISE IF THIS CAN BE DONE

## 2022-12-14 ENCOUNTER — OFFICE VISIT (OUTPATIENT)
Dept: FAMILY MEDICINE CLINIC | Facility: CLINIC | Age: 62
End: 2022-12-14

## 2022-12-14 ENCOUNTER — HOSPITAL ENCOUNTER (OUTPATIENT)
Dept: GENERAL RADIOLOGY | Facility: HOSPITAL | Age: 62
Discharge: HOME | End: 2022-12-14
Admitting: NURSE PRACTITIONER

## 2022-12-14 VITALS
OXYGEN SATURATION: 95 % | TEMPERATURE: 99.2 F | BODY MASS INDEX: 31.75 KG/M2 | HEIGHT: 66 IN | HEART RATE: 95 BPM | DIASTOLIC BLOOD PRESSURE: 64 MMHG | SYSTOLIC BLOOD PRESSURE: 133 MMHG

## 2022-12-14 DIAGNOSIS — J40 BRONCHITIS WITH WHEEZING: ICD-10-CM

## 2022-12-14 DIAGNOSIS — R05.1 ACUTE COUGH: ICD-10-CM

## 2022-12-14 DIAGNOSIS — R05.1 ACUTE COUGH: Primary | ICD-10-CM

## 2022-12-14 PROCEDURE — 96372 THER/PROPH/DIAG INJ SC/IM: CPT | Performed by: NURSE PRACTITIONER

## 2022-12-14 PROCEDURE — 99213 OFFICE O/P EST LOW 20 MIN: CPT | Performed by: NURSE PRACTITIONER

## 2022-12-14 PROCEDURE — 71046 X-RAY EXAM CHEST 2 VIEWS: CPT

## 2022-12-14 RX ORDER — METHYLPREDNISOLONE SODIUM SUCCINATE 125 MG/2ML
125 INJECTION, POWDER, LYOPHILIZED, FOR SOLUTION INTRAMUSCULAR; INTRAVENOUS ONCE
Status: COMPLETED | OUTPATIENT
Start: 2022-12-14 | End: 2022-12-14

## 2022-12-14 RX ORDER — CEFDINIR 300 MG/1
300 CAPSULE ORAL 2 TIMES DAILY
Qty: 20 CAPSULE | Refills: 0 | Status: SHIPPED | OUTPATIENT
Start: 2022-12-14 | End: 2023-01-05

## 2022-12-14 RX ORDER — ALBUTEROL SULFATE 90 UG/1
2 AEROSOL, METERED RESPIRATORY (INHALATION) EVERY 4 HOURS PRN
Qty: 18 G | Refills: 1 | Status: SHIPPED | OUTPATIENT
Start: 2022-12-14

## 2022-12-14 RX ORDER — METHYLPREDNISOLONE 4 MG/1
TABLET ORAL
Qty: 21 TABLET | Refills: 1 | Status: SHIPPED | OUTPATIENT
Start: 2022-12-14 | End: 2023-01-05

## 2022-12-14 RX ADMIN — METHYLPREDNISOLONE SODIUM SUCCINATE 125 MG: 125 INJECTION, POWDER, LYOPHILIZED, FOR SOLUTION INTRAMUSCULAR; INTRAVENOUS at 11:58

## 2022-12-14 NOTE — PROGRESS NOTES
"Chief Complaint  Cough and Nasal Congestion    Subjective         Osman Holman presents to [facility]  HPI     Complains of-tested positive for COVID 9 days ago.  Continues with productive cough, nasal congestion, wheezing, shortness of breath.  No fever now.    PHQ-2 Total Score:    PHQ-9 Total Score:      Review of Systems   Constitutional: Negative.    HENT: Positive for congestion, postnasal drip, rhinorrhea and sinus pressure.    Eyes: Negative.    Respiratory: Positive for cough, chest tightness, shortness of breath and wheezing.    Cardiovascular: Negative.    Gastrointestinal: Negative.        Social History     Socioeconomic History   • Marital status:    Tobacco Use   • Smoking status: Former     Packs/day: 0.50     Types: Cigarettes   • Smokeless tobacco: Never   • Tobacco comments:     0.5 ppd, smoked 6-10 years   Vaping Use   • Vaping Use: Never used   Substance and Sexual Activity   • Alcohol use: Yes     Comment: occasionally drinks   • Drug use: Never   • Sexual activity: Defer        Objective     Vitals:    12/14/22 1116   BP: 133/64   BP Location: Right arm   Patient Position: Sitting   Cuff Size: Large Adult   Pulse: 95   Temp: 99.2 °F (37.3 °C)   TempSrc: Temporal   SpO2: 95%   Height: 167.6 cm (66\")        Body mass index is 31.75 kg/m².    Wt Readings from Last 3 Encounters:   12/05/22 89.2 kg (196 lb 11.2 oz)   08/09/22 90.7 kg (200 lb)   08/01/22 90.7 kg (200 lb)       BP Readings from Last 3 Encounters:   12/14/22 133/64   12/05/22 142/75   08/01/22 132/76                 Physical Exam  Vitals reviewed.   Constitutional:       Appearance: Normal appearance.   HENT:      Head: Normocephalic and atraumatic.      Nose:      Right Sinus: Maxillary sinus tenderness and frontal sinus tenderness present.      Left Sinus: Maxillary sinus tenderness and frontal sinus tenderness present.   Eyes:      Conjunctiva/sclera: Conjunctivae normal.      Pupils: " Pupils are equal, round, and reactive to light.   Cardiovascular:      Rate and Rhythm: Normal rate and regular rhythm.      Pulses: Normal pulses.      Heart sounds: Normal heart sounds.   Pulmonary:      Effort: Pulmonary effort is normal.      Breath sounds: Examination of the right-upper field reveals wheezing and rhonchi. Examination of the left-upper field reveals wheezing and rhonchi. Examination of the right-middle field reveals wheezing and rhonchi. Examination of the left-middle field reveals wheezing and rhonchi. Examination of the right-lower field reveals wheezing and rhonchi. Examination of the left-lower field reveals wheezing and rhonchi. Wheezing and rhonchi present.   Abdominal:      General: Bowel sounds are normal.      Palpations: Abdomen is soft.   Musculoskeletal:      Cervical back: Normal range of motion.   Skin:     General: Skin is warm and dry.   Neurological:      Mental Status: He is alert and oriented to person, place, and time.   Psychiatric:         Mood and Affect: Mood normal.         Behavior: Behavior normal.         Thought Content: Thought content normal.         Judgment: Judgment normal.          Result Review :   The following data was reviewed by: OCTAVIO Chirinos on 12/14/2022:      Procedures    Assessment and Plan   Diagnoses and all orders for this visit:    1. Acute cough (Primary)  -     XR Chest PA & Lateral; Future    2. Bronchitis with wheezing  -     albuterol sulfate  (90 Base) MCG/ACT inhaler; Inhale 2 puffs Every 4 (Four) Hours As Needed for Wheezing.  Dispense: 18 g; Refill: 1  -     methylPREDNISolone sodium succinate (SOLU-Medrol) injection 125 mg  -     methylPREDNISolone (MEDROL) 4 MG dose pack; Take as directed on package instructions.  Dispense: 21 tablet; Refill: 1  -     cefdinir (OMNICEF) 300 MG capsule; Take 1 capsule by mouth 2 (Two) Times a Day.  Dispense: 20 capsule; Refill: 0    Advised to get OTC Mucinex DM because it will help  loosen and thin out secretions.        Follow Up   Return if symptoms worsen or fail to improve, for Next scheduled follow up.  Patient was given instructions and counseling regarding his condition or for health maintenance advice. Please see specific information pulled into the AVS if appropriate.     Please note that portions of this note were completed with a voice recognition program.    Electronically signed by OCTAVIO Chirinos  12/14/2022, 12:28 EST

## 2022-12-15 ENCOUNTER — TELEPHONE (OUTPATIENT)
Dept: FAMILY MEDICINE CLINIC | Facility: CLINIC | Age: 62
End: 2022-12-15

## 2022-12-15 NOTE — TELEPHONE ENCOUNTER
[phone]    PLEASE CALL PATIENT WITH LAB RESULTS.  DOESN'T WANT TO CONTINUE MEDICINE IF NOT NEEDED.     PLEASE ADVISE.

## 2023-01-05 ENCOUNTER — OFFICE VISIT (OUTPATIENT)
Dept: CARDIOLOGY | Facility: CLINIC | Age: 63
End: 2023-01-05
Payer: COMMERCIAL

## 2023-01-05 VITALS
WEIGHT: 195 LBS | HEART RATE: 80 BPM | HEIGHT: 66 IN | SYSTOLIC BLOOD PRESSURE: 143 MMHG | BODY MASS INDEX: 31.34 KG/M2 | DIASTOLIC BLOOD PRESSURE: 74 MMHG

## 2023-01-05 DIAGNOSIS — E78.5 DYSLIPIDEMIA: ICD-10-CM

## 2023-01-05 DIAGNOSIS — I35.0 AORTIC STENOSIS, SEVERE: Primary | ICD-10-CM

## 2023-01-05 DIAGNOSIS — I10 ESSENTIAL HYPERTENSION: ICD-10-CM

## 2023-01-05 DIAGNOSIS — I51.7 LVH (LEFT VENTRICULAR HYPERTROPHY): ICD-10-CM

## 2023-01-05 PROCEDURE — 99214 OFFICE O/P EST MOD 30 MIN: CPT | Performed by: INTERNAL MEDICINE

## 2023-01-05 NOTE — ASSESSMENT & PLAN NOTE
Patient with echocardiogram evidence of severe stenosis but no overt symptoms counseled on watching out for any change in breathing capacity syncope or anginal symptoms.  We will repeat echogram next visit follow-up in 6 months

## 2023-01-05 NOTE — PROGRESS NOTES
Chief Complaint  Aortic stenosis, severe; Follow-up; Hypertension; and Hyperlipidemia    Subjective    Patient is doing well he is anginal chest pain shortness of breath or syncope episodes.    Past Medical History:   Diagnosis Date   • Allergic rhinitis    • Aortic stenosis     ASYPTOMATIC- SEE'S DR. PACK, DENIED CP/SOB   • Arthritis    • Carpal tunnel syndrome on both sides CURRENTLY   • Dermatitis    • Diabetes (HCC)     TYPE II, BG RUNS UNDER 200 IN AM   • Diabetes mellitus type 2 with complications, uncontrolled 01/30/2019   • ED (erectile dysfunction)    • Essential hypertension    • GERD (gastroesophageal reflux disease)    • Heart murmur     ASYMPTOMATIC- SEE'S DR PACK, DENIED CP/SOB   • Hyperlipemia    • Hyperlipidemia    • Hypertension    • Hypogonadism in male 01/30/2019   • Nose irritation 02/27/2020   • Nostril sore 02/27/2020   • Thyroid disorder    • Vitamin D deficiency          Current Outpatient Medications:   •  albuterol sulfate  (90 Base) MCG/ACT inhaler, Inhale 2 puffs Every 4 (Four) Hours As Needed for Wheezing., Disp: 18 g, Rfl: 1  •  amLODIPine (NORVASC) 5 MG tablet, Take 5 mg by mouth Every Night., Disp: , Rfl:   •  aspirin 81 MG chewable tablet, Chew 81 mg Daily. PT REPORTED TO STOP 1 WEEK PRIOR TO SURGERY, LAST DOSE 10/04/21per Dr. Copeland's instruction, Disp: , Rfl:   •  atorvastatin (LIPITOR) 40 MG tablet, Take 40 mg by mouth Every Night., Disp: , Rfl:   •  cetirizine (zyrTEC) 10 MG tablet, Take 10 mg by mouth Every Morning., Disp: , Rfl:   •  cholecalciferol (VITAMIN D3) 25 MCG (1000 UT) tablet, Take 1,000 Units by mouth Daily., Disp: , Rfl:   •  Coenzyme Q10 100 MG tablet, Take 100 mg by mouth Daily., Disp: , Rfl:   •  Diclofenac Sodium (VOLTAREN) 1 % gel gel, APPLY 4 GRAMS TO AFFECTED AREA FOUR TIMES A DAY AS NEEDED, Disp: , Rfl:   •  empagliflozin (JARDIANCE) 25 MG tablet tablet, Take 25 mg by mouth Every Morning. Taking half tablet daily, Disp: , Rfl:   •  ezetimibe (ZETIA) 10  MG tablet, Take 10 mg by mouth Daily., Disp: , Rfl:   •  fluticasone (FLONASE) 50 MCG/ACT nasal spray, 2 sprays into the nostril(s) as directed by provider Daily As Needed., Disp: , Rfl:   •  glipizide (GLUCOTROL) 10 MG tablet, Take 20 mg by mouth Daily Before Supper. INSTRUCTED PER ANESTHESIA STANDING ORDERS, Disp: , Rfl:   •  hydrocortisone 2.5 % cream, , Disp: , Rfl:   •  Ketoconazole 1 % shampoo, Apply  topically to the appropriate area as directed., Disp: , Rfl:   •  levothyroxine (SYNTHROID, LEVOTHROID) 50 MCG tablet, Take 50 mcg by mouth., Disp: , Rfl:   •  lisinopril-hydrochlorothiazide (PRINZIDE,ZESTORETIC) 10-12.5 MG per tablet, Take 1 tablet by mouth Every Morning. inst per anesthesia protocol, Disp: , Rfl:   •  meloxicam (MOBIC) 15 MG tablet, Take 15 mg by mouth Daily As Needed. inst per anesthesia protocol, Disp: , Rfl:   •  metFORMIN ER (GLUCOPHAGE-XR) 500 MG 24 hr tablet, , Disp: , Rfl:   •  omeprazole (priLOSEC) 20 MG capsule, Take 20 mg by mouth Daily As Needed., Disp: , Rfl:   •  Ozempic, 0.25 or 0.5 MG/DOSE, 2 MG/1.5ML solution pen-injector, , Disp: , Rfl:   •  sildenafil (VIAGRA) 100 MG tablet, , Disp: , Rfl:   •  Testosterone Cypionate (DEPOTESTOTERONE CYPIONATE) 200 MG/ML injection, Inject 0.88 mL into the appropriate muscle as directed by prescriber Every 14 (Fourteen) Days., Disp: 6 mL, Rfl: 1    Medications Discontinued During This Encounter   Medication Reason   • benzonatate (TESSALON) 200 MG capsule *Therapy completed   • cefdinir (OMNICEF) 300 MG capsule *Therapy completed   • methylPREDNISolone (MEDROL) 4 MG dose pack *Therapy completed     Allergies   Allergen Reactions   • Penicillins Itching     PT REPORTED CAN TAKE KEFLEX NO REACTIONS         Social History     Tobacco Use   • Smoking status: Former     Packs/day: 0.50     Types: Cigarettes   • Smokeless tobacco: Never   • Tobacco comments:     0.5 ppd, smoked 6-10 years   Vaping Use   • Vaping Use: Never used   Substance Use Topics    • Alcohol use: Yes     Comment: occasionally drinks   • Drug use: Never       Family History   Problem Relation Age of Onset   • Hypertension Mother    • Arthritis Mother    • Stroke Father    • Diabetes Father    • Hypertension Father    • Hypertension Sister    • Arthritis Sister    • Hypertension Other    • Diabetes Other    • Malig Hyperthermia Neg Hx         Objective     /74   Pulse 80   Ht 167.6 cm (66\")   Wt 88.5 kg (195 lb)   BMI 31.47 kg/m²       Physical Exam    General Appearance:   · no acute distress  · Alert and oriented x3  HENT:   · lips not cyanotic  · Atraumatic  Neck:  · No jvd   · supple  Respiratory:  · no respiratory distress  · normal breath sounds  · no rales  Cardiovascular:  · Regular rate and rhythm  · no S3, no S4   · 2/6 late peaking systolic murmur  · no rub  Extremities  · No cyanosis  · lower extremity edema: none    Skin:   · warm, dry  · No rashes      Result Review :     No results found for: PROBNP  CMP    CMP 2/2/22 7/19/22   Glucose 172 (A) 156 (A)   BUN 17 15   Creatinine 1.05 0.96   eGFR Non African Am 71    eGFR  88.3   Sodium 134 (A) 136   Potassium 4.1 4.5   Chloride 96 (A) 98   Calcium 8.9 8.8   Total Protein 7.1 6.9   Albumin 4.50 4.30   Globulin 2.6 2.6   Total Bilirubin 0.6 0.6   Alkaline Phosphatase 59 47   AST (SGOT) 16 18   ALT (SGPT) 28 22   Albumin/Globulin Ratio 1.7 1.7   BUN/Creatinine Ratio 16.2 15.6   Anion Gap 11.5 14.3   (A) Abnormal value       Comments are available for some flowsheets but are not being displayed.           CBC w/diff    CBC w/Diff 2/2/22 7/19/22   WBC 7.77 8.99   RBC 5.76 5.23   Hemoglobin 16.6 15.3   Hematocrit 52.7 (A) 45.4   MCV 91.5 86.8   MCH 28.8 29.3   MCHC 31.5 33.7   RDW 14.0 14.3   Platelets 176 194   Neutrophil Rel %  68.2   Immature Granulocyte Rel %  0.4   Lymphocyte Rel %  19.1 (A)   Monocyte Rel %  9.8   Eosinophil Rel %  1.9   Basophil Rel %  0.6   (A) Abnormal value             Lab Results   Component Value  Date    TSH 1.630 03/01/2021      No results found for: FREET4   No results found for: DDIMERQUANT  No results found for: MG   No results found for: DIGOXIN   No results found for: TROPONINT          No results found for: POCTROP    Results for orders placed in visit on 06/02/22    Adult Transthoracic Echo Complete W/ Cont if Necessary Per Protocol    Interpretation Summary  · Calculated left ventricular EF = 56% Estimated left ventricular EF was in agreement with the calculated left ventricular EF.  · Left ventricular wall thickness is consistent with mild concentric hypertrophy.  · Severe aortic valve stenosis is present.                 Diagnoses and all orders for this visit:    1. Aortic stenosis, severe (Primary)  Assessment & Plan:  Patient with echocardiogram evidence of severe stenosis but no overt symptoms counseled on watching out for any change in breathing capacity syncope or anginal symptoms.  We will repeat echogram next visit follow-up in 6 months    Orders:  -     Adult Transthoracic Echo Complete W/ Cont if Necessary Per Protocol; Future    2. Essential hypertension  Assessment & Plan:  Blood pressure at home goal range continue with Norvasc 5 mg once a day      3. Dyslipidemia    4. LVH (left ventricular hypertrophy)          Follow Up     Return in about 6 months (around 7/5/2023).          Patient was given instructions and counseling regarding his condition or for health maintenance advice. Please see specific information pulled into the AVS if appropriate.

## 2023-01-20 ENCOUNTER — LAB (OUTPATIENT)
Dept: LAB | Facility: HOSPITAL | Age: 63
End: 2023-01-20
Payer: MEDICARE

## 2023-01-20 ENCOUNTER — TRANSCRIBE ORDERS (OUTPATIENT)
Dept: LAB | Facility: HOSPITAL | Age: 63
End: 2023-01-20
Payer: OTHER GOVERNMENT

## 2023-01-20 DIAGNOSIS — E11.65 TYPE II DIABETES MELLITUS WITH HYPEROSMOLARITY, UNCONTROLLED: Primary | ICD-10-CM

## 2023-01-20 DIAGNOSIS — E11.00 TYPE II DIABETES MELLITUS WITH HYPEROSMOLARITY, UNCONTROLLED: ICD-10-CM

## 2023-01-20 DIAGNOSIS — E11.00 TYPE II DIABETES MELLITUS WITH HYPEROSMOLARITY, UNCONTROLLED: Primary | ICD-10-CM

## 2023-01-20 DIAGNOSIS — E11.65 TYPE II DIABETES MELLITUS WITH HYPEROSMOLARITY, UNCONTROLLED: ICD-10-CM

## 2023-01-20 DIAGNOSIS — Z12.5 PROSTATE CANCER SCREENING: ICD-10-CM

## 2023-01-20 DIAGNOSIS — E29.1 HYPOGONADISM IN MALE: ICD-10-CM

## 2023-01-20 LAB
ALBUMIN SERPL-MCNC: 4.6 G/DL (ref 3.5–5.2)
ALBUMIN/GLOB SERPL: 1.8 G/DL
ALP SERPL-CCNC: 61 U/L (ref 39–117)
ALT SERPL W P-5'-P-CCNC: 27 U/L (ref 1–41)
ANION GAP SERPL CALCULATED.3IONS-SCNC: 11.8 MMOL/L (ref 5–15)
AST SERPL-CCNC: 20 U/L (ref 1–40)
BASOPHILS # BLD AUTO: 0.07 10*3/MM3 (ref 0–0.2)
BASOPHILS NFR BLD AUTO: 1 % (ref 0–1.5)
BILIRUB SERPL-MCNC: 0.6 MG/DL (ref 0–1.2)
BUN SERPL-MCNC: 14 MG/DL (ref 8–23)
BUN/CREAT SERPL: 14.9 (ref 7–25)
CALCIUM SPEC-SCNC: 8.9 MG/DL (ref 8.6–10.5)
CHLORIDE SERPL-SCNC: 98 MMOL/L (ref 98–107)
CO2 SERPL-SCNC: 25.2 MMOL/L (ref 22–29)
CREAT SERPL-MCNC: 0.94 MG/DL (ref 0.76–1.27)
DEPRECATED RDW RBC AUTO: 46.4 FL (ref 37–54)
EGFRCR SERPLBLD CKD-EPI 2021: 90 ML/MIN/1.73
EOSINOPHIL # BLD AUTO: 0.24 10*3/MM3 (ref 0–0.4)
EOSINOPHIL NFR BLD AUTO: 3.4 % (ref 0.3–6.2)
ERYTHROCYTE [DISTWIDTH] IN BLOOD BY AUTOMATED COUNT: 14.8 % (ref 12.3–15.4)
GLOBULIN UR ELPH-MCNC: 2.6 GM/DL
GLUCOSE SERPL-MCNC: 116 MG/DL (ref 65–99)
HBA1C MFR BLD: 7.5 % (ref 4.8–5.6)
HCT VFR BLD AUTO: 43.4 % (ref 37.5–51)
HGB BLD-MCNC: 14.1 G/DL (ref 13–17.7)
IMM GRANULOCYTES # BLD AUTO: 0.04 10*3/MM3 (ref 0–0.05)
IMM GRANULOCYTES NFR BLD AUTO: 0.6 % (ref 0–0.5)
LYMPHOCYTES # BLD AUTO: 1.51 10*3/MM3 (ref 0.7–3.1)
LYMPHOCYTES NFR BLD AUTO: 21.1 % (ref 19.6–45.3)
MCH RBC QN AUTO: 28.1 PG (ref 26.6–33)
MCHC RBC AUTO-ENTMCNC: 32.5 G/DL (ref 31.5–35.7)
MCV RBC AUTO: 86.6 FL (ref 79–97)
MONOCYTES # BLD AUTO: 0.76 10*3/MM3 (ref 0.1–0.9)
MONOCYTES NFR BLD AUTO: 10.6 % (ref 5–12)
NEUTROPHILS NFR BLD AUTO: 4.53 10*3/MM3 (ref 1.7–7)
NEUTROPHILS NFR BLD AUTO: 63.3 % (ref 42.7–76)
NRBC BLD AUTO-RTO: 0 /100 WBC (ref 0–0.2)
PLATELET # BLD AUTO: 234 10*3/MM3 (ref 140–450)
PMV BLD AUTO: 9.8 FL (ref 6–12)
POTASSIUM SERPL-SCNC: 4.2 MMOL/L (ref 3.5–5.2)
PROT SERPL-MCNC: 7.2 G/DL (ref 6–8.5)
PSA SERPL-MCNC: 0.43 NG/ML (ref 0–4)
RBC # BLD AUTO: 5.01 10*6/MM3 (ref 4.14–5.8)
SODIUM SERPL-SCNC: 135 MMOL/L (ref 136–145)
TESTOST SERPL-MCNC: 278 NG/DL (ref 193–740)
WBC NRBC COR # BLD: 7.15 10*3/MM3 (ref 3.4–10.8)

## 2023-01-20 PROCEDURE — 85025 COMPLETE CBC W/AUTO DIFF WBC: CPT

## 2023-01-20 PROCEDURE — 80053 COMPREHEN METABOLIC PANEL: CPT

## 2023-01-20 PROCEDURE — G0103 PSA SCREENING: HCPCS

## 2023-01-20 PROCEDURE — 84403 ASSAY OF TOTAL TESTOSTERONE: CPT

## 2023-01-20 PROCEDURE — 36415 COLL VENOUS BLD VENIPUNCTURE: CPT

## 2023-01-20 PROCEDURE — 83036 HEMOGLOBIN GLYCOSYLATED A1C: CPT

## 2023-02-01 ENCOUNTER — TELEPHONE (OUTPATIENT)
Dept: UROLOGY | Facility: CLINIC | Age: 63
End: 2023-02-01
Payer: OTHER GOVERNMENT

## 2023-02-04 NOTE — PROGRESS NOTES
Chief Complaint    Urologic complaint    Subjective          Osman Holman presents to [facility] UROLOGY  History of Present Illness       61-year-old  gentleman      Hypogonadism        BMP, CBC within normal limits    Energy level and libido ok. No  change    Voiding okay.  Good stream.  No prostate meds    No GH        Still  on Depo-Testosterone 175 mg (0.8ml)every 2 weeks IM at home. Refilled today      No cardiopulmonary history.  Patient does not smoke.  Patient does not use blood thinner.          Previous    Does not want a NP for rectal exam.  We did discuss risk and benefits of rectal exams at this time and unless we see increasing PSA will likely do no further rectal exam.    History of increased H/H, has donated blood periodically which is helped    Was on Depo testosterone testosterone injections 200 mg IM every 2 weeks.  Patient is followed by endocrinology secondary to his diabetes.  She found that his H&H was elevated and had him decrease his testosterone down to 175 mg (0.8 ml) every 2 weeks.  He has been doing okay on this and on repeat H/H he is back within normal limits.  No real changes to his fatigue or libido.          Total testosterone    1/23    278    7/22    382   2/22    316     7/21     93  1/21    251  1/20    400  11/18  393  11/17  504      main complaint initially was decreased energy and decreased libido.      No history of kidney stone.    No urologic family history,   Has never had any urologic surgery.      PSA    1/23    0.43  2/22     0.32     1/21     0.44  1/20     0.41  11/18   0.48  11/17   0.46  11/13   0.47      Past History:  Medical History: has a past medical history of Allergic rhinitis, Aortic stenosis, Arthritis, Carpal tunnel syndrome on both sides (CURRENTLY), Dermatitis, Diabetes (HCC), Diabetes mellitus type 2 with complications, uncontrolled (01/30/2019), ED (erectile dysfunction), Essential hypertension, GERD (gastroesophageal  reflux disease), Heart murmur, Hyperlipemia, Hyperlipidemia, Hypertension, Hypogonadism in male (01/30/2019), Nose irritation (02/27/2020), Nostril sore (02/27/2020), Thyroid disorder, and Vitamin D deficiency.   Surgical History: has a past surgical history that includes Cholecystectomy; Colonoscopy; and Carpal tunnel release (Bilateral, 10/12/2021).   Family History: family history includes Arthritis in his mother and sister; Diabetes in his father and another family member; Hypertension in his father, mother, sister, and another family member; Stroke in his father.   Social History: reports that he has quit smoking. His smoking use included cigarettes. He smoked an average of .5 packs per day. He has never used smokeless tobacco. He reports current alcohol use. He reports that he does not use drugs.  Allergies: Penicillins       Current Outpatient Medications:   •  albuterol sulfate  (90 Base) MCG/ACT inhaler, Inhale 2 puffs Every 4 (Four) Hours As Needed for Wheezing., Disp: 18 g, Rfl: 1  •  amLODIPine (NORVASC) 5 MG tablet, Take 5 mg by mouth Every Night., Disp: , Rfl:   •  aspirin 81 MG chewable tablet, Chew 81 mg Daily. PT REPORTED TO STOP 1 WEEK PRIOR TO SURGERY, LAST DOSE 10/04/21per Dr. Copeland's instruction, Disp: , Rfl:   •  atorvastatin (LIPITOR) 40 MG tablet, Take 40 mg by mouth Every Night., Disp: , Rfl:   •  cetirizine (zyrTEC) 10 MG tablet, Take 10 mg by mouth Every Morning., Disp: , Rfl:   •  cholecalciferol (VITAMIN D3) 25 MCG (1000 UT) tablet, Take 1,000 Units by mouth Daily., Disp: , Rfl:   •  Coenzyme Q10 100 MG tablet, Take 100 mg by mouth Daily., Disp: , Rfl:   •  Diclofenac Sodium (VOLTAREN) 1 % gel gel, APPLY 4 GRAMS TO AFFECTED AREA FOUR TIMES A DAY AS NEEDED, Disp: , Rfl:   •  empagliflozin (JARDIANCE) 25 MG tablet tablet, Take 25 mg by mouth Every Morning. Taking half tablet daily, Disp: , Rfl:   •  ezetimibe (ZETIA) 10 MG tablet, Take 10 mg by mouth Daily., Disp: , Rfl:   •   fluticasone (FLONASE) 50 MCG/ACT nasal spray, 2 sprays into the nostril(s) as directed by provider Daily As Needed., Disp: , Rfl:   •  glipizide (GLUCOTROL) 10 MG tablet, Take 20 mg by mouth Daily Before Supper. INSTRUCTED PER ANESTHESIA STANDING ORDERS, Disp: , Rfl:   •  hydrocortisone 2.5 % cream, , Disp: , Rfl:   •  Ketoconazole 1 % shampoo, Apply  topically to the appropriate area as directed., Disp: , Rfl:   •  levothyroxine (SYNTHROID, LEVOTHROID) 50 MCG tablet, Take 50 mcg by mouth., Disp: , Rfl:   •  lisinopril-hydrochlorothiazide (PRINZIDE,ZESTORETIC) 10-12.5 MG per tablet, Take 1 tablet by mouth Every Morning. inst per anesthesia protocol, Disp: , Rfl:   •  meloxicam (MOBIC) 15 MG tablet, Take 15 mg by mouth Daily As Needed. inst per anesthesia protocol, Disp: , Rfl:   •  metFORMIN ER (GLUCOPHAGE-XR) 500 MG 24 hr tablet, , Disp: , Rfl:   •  omeprazole (priLOSEC) 20 MG capsule, Take 20 mg by mouth Daily As Needed., Disp: , Rfl:   •  Ozempic, 0.25 or 0.5 MG/DOSE, 2 MG/1.5ML solution pen-injector, , Disp: , Rfl:   •  sildenafil (VIAGRA) 100 MG tablet, , Disp: , Rfl:   •  Testosterone Cypionate (DEPOTESTOTERONE CYPIONATE) 200 MG/ML injection, Inject 0.88 mL into the appropriate muscle as directed by prescriber Every 14 (Fourteen) Days., Disp: 6 mL, Rfl: 1            Objective     Vital Signs:   There were no vitals taken for this visit.             Assessment and Plan    Diagnoses and all orders for this visit:    1. Hypogonadism in male (Primary)      Reviewed labs within normal limits    continue Depo-Testosterone 175 mg (0.8ml)  every 2 weeks IM at home. Refilled today.     using 1 mL vials and wasting the rest      Follow-up in 6 months with a CMP, CBC, and total testosterone

## 2023-02-07 ENCOUNTER — OFFICE VISIT (OUTPATIENT)
Dept: UROLOGY | Facility: CLINIC | Age: 63
End: 2023-02-07
Payer: MEDICARE

## 2023-02-07 VITALS — RESPIRATION RATE: 20 BRPM

## 2023-02-07 DIAGNOSIS — E29.1 HYPOGONADISM IN MALE: Primary | ICD-10-CM

## 2023-02-07 PROCEDURE — 99214 OFFICE O/P EST MOD 30 MIN: CPT | Performed by: UROLOGY

## 2023-02-07 RX ORDER — TESTOSTERONE CYPIONATE 200 MG/ML
175 INJECTION, SOLUTION INTRAMUSCULAR
Qty: 6 ML | Refills: 1 | Status: SHIPPED | OUTPATIENT
Start: 2023-02-07

## 2023-02-13 ENCOUNTER — TELEPHONE (OUTPATIENT)
Dept: UROLOGY | Facility: CLINIC | Age: 63
End: 2023-02-13
Payer: OTHER GOVERNMENT

## 2023-02-13 NOTE — TELEPHONE ENCOUNTER
Pt said that he needs a PA for the testosterone. [employer] auth dept phone number is [phone] and the fax is [phone].Call him with any questions.

## 2023-05-11 ENCOUNTER — TELEPHONE (OUTPATIENT)
Dept: FAMILY MEDICINE CLINIC | Facility: CLINIC | Age: 63
End: 2023-05-11

## 2023-05-11 NOTE — TELEPHONE ENCOUNTER
Caller: Osman Holman Jr.    Relationship to patient: Self    Best call back number: [phone]    Patient is needing: PATIENT CALLED IN AND SAID HE WOULD LIKE TO HAVE A CALL BACK FROM DR. VALDERRAMA. HE SAID HE WOULD LIKE TO DISCUSS SOME ISSUES HE HAS BEEN HAVING ONE OF THESE BEING BRONCHITIS AND ALSO WANTED TO DISCUSS TRELEGY, ALSO.

## 2023-05-16 ENCOUNTER — OFFICE VISIT (OUTPATIENT)
Dept: FAMILY MEDICINE CLINIC | Facility: CLINIC | Age: 63
End: 2023-05-16
Payer: OTHER GOVERNMENT

## 2023-05-16 VITALS
OXYGEN SATURATION: 98 % | HEIGHT: 66 IN | SYSTOLIC BLOOD PRESSURE: 148 MMHG | WEIGHT: 195 LBS | TEMPERATURE: 97.6 F | HEART RATE: 95 BPM | BODY MASS INDEX: 31.34 KG/M2 | DIASTOLIC BLOOD PRESSURE: 78 MMHG

## 2023-05-16 DIAGNOSIS — J06.9 ACUTE URI: Primary | ICD-10-CM

## 2023-05-16 RX ORDER — SEMAGLUTIDE 1.34 MG/ML
0.5 INJECTION, SOLUTION SUBCUTANEOUS
COMMUNITY

## 2023-05-16 RX ORDER — FLUTICASONE FUROATE, UMECLIDINIUM BROMIDE AND VILANTEROL TRIFENATATE 200; 62.5; 25 UG/1; UG/1; UG/1
1 POWDER RESPIRATORY (INHALATION) DAILY
Qty: 3 EACH | Refills: 3 | Status: SHIPPED | OUTPATIENT
Start: 2023-05-16

## 2023-05-16 RX ORDER — METFORMIN HYDROCHLORIDE 500 MG/1
500 TABLET, EXTENDED RELEASE ORAL
COMMUNITY

## 2023-05-16 NOTE — PROGRESS NOTES
Chief Complaint  Fatigue (S/S 1.5 wks ago), Nausea, Ear Fullness (Left ear felt full; could not hear ), and Cough (Early in the mornings, productive subsided by afternoons )    SUBJECTIVE  Osman SAMY Holman  presents to [facility]    Patient is a 63 years old hypertension patient recently stopped his amlodipine needs to go back to it blood pressure today was 148/70 8 aortic stenosis followed by cardiology COPD takes trilogy recent episode of bronchitis treated with Omnicef type 2 diabetes takes Ozempic and glipizide followed by endocrinologist had Cologuard 2021 needs repeat in 3 years 2024    PAST MEDICAL HISTORY  Allergies   Allergen Reactions   • Penicillins Itching     PT REPORTED CAN TAKE KEFLEX NO REACTIONS         Past Surgical History:   • CARPAL TUNNEL RELEASE    Procedure: BILATERAL CARPAL TUNNEL RELEASE;  Surgeon: Ab Copeland MD;  Location: [facility] MAIN OR;  Service: Orthopedics;  Laterality: Bilateral;   • CHOLECYSTECTOMY   • COLONOSCOPY       Social History     Tobacco Use   • Smoking status: Former     Packs/day: 0.50     Types: Cigarettes     Passive exposure: Past   • Smokeless tobacco: Never   • Tobacco comments:     0.5 ppd, smoked 6-10 years   Substance Use Topics   • Alcohol use: Yes     Comment: occasionally drinks       Family History   Problem Relation Age of Onset   • Hypertension Mother    • Arthritis Mother    • Stroke Father    • Diabetes Father    • Hypertension Father    • Hypertension Sister    • Arthritis Sister    • Hypertension Other    • Diabetes Other    • Malig Hyperthermia Neg Hx         Health Maintenance Due   Topic Date Due   • URINE MICROALBUMIN  Never done   • COLORECTAL CANCER SCREENING  Never done   • Pneumococcal Vaccine 65+ (1 - PCV) Never done   • ZOSTER VACCINE (1 of 2) Never done   • HEPATITIS C SCREENING  Never done   • ANNUAL WELLNESS VISIT  Never done   • DIABETIC FOOT EXAM  Never done   • LIPID PANEL  Never done   • COVID-19  "Vaccine (5 - Booster for Moderna series) 04/23/2022   • DIABETIC EYE EXAM  04/27/2022   • AAA SCREEN (ONE-TIME)  Never done        Last Completed Colonoscopy     This patient has no relevant Health Maintenance data.          REVIEW OF SYSTEMS    Respiratory had a cough starting to feel better trilogy usually helps was on prednisone and Omnicef resolving  Type 2 diabetes A1c is 6.9 specimen collected April 14  Vascular no chest pain no palpitations patient is followed by cardiology for his aortic stenosis  OBJECTIVE  Vitals:    05/16/23 0941   BP: 148/78   BP Location: Right arm   Patient Position: Sitting   Cuff Size: Large Adult   Pulse: 95   Temp: 97.6 °F (36.4 °C)   TempSrc: Temporal   SpO2: 98%   Weight: 88.5 kg (195 lb)   Height: 167.6 cm (65.98\")     Body mass index is 31.49 kg/m².    PHYSICAL EXAM    General no distress  ENT TMs are negative  Lungs clear  Equal bilaterally  Cardiovascular 2/6 systolic ejection murmur high-pitched left sternal border aortic stenosis  Abdomen soft nontender    ASSESSMENT & PLAN  There are no diagnoses linked to this encounter.      Recovering from a URI type 2 diabetes good control hypertension well controlled aortic stenosis followed by cardiology COPD helped with trilogy            Patient was given instructions and counseling regarding his condition or for health maintenance advice. Please see specific information pulled into the AVS if appropriate.   "

## 2023-06-22 PROBLEM — S76.302A LEFT HAMSTRING INJURY: Status: ACTIVE | Noted: 2023-06-22

## 2023-06-22 PROBLEM — I65.23 OCCLUSION AND STENOSIS OF BILATERAL CAROTID ARTERIES: Status: ACTIVE | Noted: 2023-06-22

## 2023-06-22 PROBLEM — M79.662 PAIN OF LEFT CALF: Status: ACTIVE | Noted: 2023-06-22

## 2023-06-22 PROBLEM — M25.569 KNEE PAIN: Status: ACTIVE | Noted: 2023-02-23

## 2023-06-22 PROBLEM — E34.9 ABNORMALITY OF TESTOSTERONE: Status: ACTIVE | Noted: 2023-02-23

## 2023-06-22 PROBLEM — I35.9 AORTIC VALVE DISORDER: Status: ACTIVE | Noted: 2023-06-22

## 2023-06-22 PROBLEM — Z71.89 OTHER SPECIFIED COUNSELING: Status: ACTIVE | Noted: 2023-02-23

## 2023-06-22 PROBLEM — I51.7 CARDIOMEGALY: Status: ACTIVE | Noted: 2023-06-22

## 2023-06-22 PROBLEM — I77.9 DISORDER OF AORTA: Status: ACTIVE | Noted: 2023-06-22

## 2023-06-22 PROBLEM — E11.9 TYPE 2 DIABETES MELLITUS WITHOUT COMPLICATION: Status: ACTIVE | Noted: 2021-03-25

## 2023-06-22 PROBLEM — M19.90 ARTHRITIS: Status: ACTIVE | Noted: 2023-06-22

## 2023-06-22 PROBLEM — R79.89 ABNORMALITY OF TESTOSTERONE: Status: ACTIVE | Noted: 2023-02-23

## 2023-06-22 PROBLEM — F43.10 POSTTRAUMATIC STRESS DISORDER: Status: ACTIVE | Noted: 2023-02-23

## 2023-06-22 PROBLEM — E55.9 VITAMIN D DEFICIENCY: Status: ACTIVE | Noted: 2023-06-22

## 2023-06-22 PROBLEM — D48.5 NEOPLASM OF UNCERTAIN BEHAVIOR OF SKIN: Status: ACTIVE | Noted: 2023-02-23

## 2023-06-22 PROBLEM — N52.9 IMPOTENCE OF ORGANIC ORIGIN: Status: ACTIVE | Noted: 2023-06-22

## 2023-06-22 PROBLEM — J34.89 NOSE IRRITATION: Status: ACTIVE | Noted: 2020-02-27

## 2023-08-02 ENCOUNTER — PREP FOR SURGERY (OUTPATIENT)
Dept: OTHER | Facility: HOSPITAL | Age: 63
End: 2023-08-02
Payer: OTHER GOVERNMENT

## 2023-08-02 ENCOUNTER — OFFICE VISIT (OUTPATIENT)
Dept: CARDIOLOGY | Facility: CLINIC | Age: 63
End: 2023-08-02
Payer: OTHER GOVERNMENT

## 2023-08-02 ENCOUNTER — LAB (OUTPATIENT)
Dept: LAB | Facility: HOSPITAL | Age: 63
End: 2023-08-02
Payer: OTHER GOVERNMENT

## 2023-08-02 VITALS
HEIGHT: 66 IN | HEART RATE: 76 BPM | BODY MASS INDEX: 31.21 KG/M2 | SYSTOLIC BLOOD PRESSURE: 144 MMHG | WEIGHT: 194.2 LBS | DIASTOLIC BLOOD PRESSURE: 67 MMHG

## 2023-08-02 DIAGNOSIS — I35.0 AORTIC STENOSIS, SEVERE: Primary | ICD-10-CM

## 2023-08-02 DIAGNOSIS — I10 ESSENTIAL HYPERTENSION: ICD-10-CM

## 2023-08-02 DIAGNOSIS — E29.1 HYPOGONADISM IN MALE: ICD-10-CM

## 2023-08-02 LAB
ALBUMIN SERPL-MCNC: 4.4 G/DL (ref 3.5–5.2)
ALBUMIN/GLOB SERPL: 1.5 G/DL
ALP SERPL-CCNC: 55 U/L (ref 39–117)
ALT SERPL W P-5'-P-CCNC: 20 U/L (ref 1–41)
ANION GAP SERPL CALCULATED.3IONS-SCNC: 12.6 MMOL/L (ref 5–15)
AST SERPL-CCNC: 16 U/L (ref 1–40)
BILIRUB SERPL-MCNC: 0.6 MG/DL (ref 0–1.2)
BUN SERPL-MCNC: 13 MG/DL (ref 8–23)
BUN/CREAT SERPL: 12.9 (ref 7–25)
CALCIUM SPEC-SCNC: 9.4 MG/DL (ref 8.6–10.5)
CHLORIDE SERPL-SCNC: 103 MMOL/L (ref 98–107)
CO2 SERPL-SCNC: 23.4 MMOL/L (ref 22–29)
CREAT SERPL-MCNC: 1.01 MG/DL (ref 0.76–1.27)
DEPRECATED RDW RBC AUTO: 47.4 FL (ref 37–54)
EGFRCR SERPLBLD CKD-EPI 2021: 82.5 ML/MIN/1.73
ERYTHROCYTE [DISTWIDTH] IN BLOOD BY AUTOMATED COUNT: 16.4 % (ref 12.3–15.4)
GLOBULIN UR ELPH-MCNC: 2.9 GM/DL
GLUCOSE SERPL-MCNC: 162 MG/DL (ref 65–99)
HCT VFR BLD AUTO: 47.3 % (ref 37.5–51)
HGB BLD-MCNC: 15.3 G/DL (ref 13–17.7)
MCH RBC QN AUTO: 26.3 PG (ref 26.6–33)
MCHC RBC AUTO-ENTMCNC: 32.3 G/DL (ref 31.5–35.7)
MCV RBC AUTO: 81.3 FL (ref 79–97)
PLATELET # BLD AUTO: 199 10*3/MM3 (ref 140–450)
PMV BLD AUTO: 10.4 FL (ref 6–12)
POTASSIUM SERPL-SCNC: 4.6 MMOL/L (ref 3.5–5.2)
PROT SERPL-MCNC: 7.3 G/DL (ref 6–8.5)
RBC # BLD AUTO: 5.82 10*6/MM3 (ref 4.14–5.8)
SODIUM SERPL-SCNC: 139 MMOL/L (ref 136–145)
TESTOST SERPL-MCNC: 360 NG/DL (ref 193–740)
WBC NRBC COR # BLD: 7.71 10*3/MM3 (ref 3.4–10.8)

## 2023-08-02 PROCEDURE — 36415 COLL VENOUS BLD VENIPUNCTURE: CPT

## 2023-08-02 PROCEDURE — 99214 OFFICE O/P EST MOD 30 MIN: CPT | Performed by: INTERNAL MEDICINE

## 2023-08-02 PROCEDURE — 80053 COMPREHEN METABOLIC PANEL: CPT

## 2023-08-02 PROCEDURE — 85027 COMPLETE CBC AUTOMATED: CPT

## 2023-08-02 PROCEDURE — 84403 ASSAY OF TOTAL TESTOSTERONE: CPT

## 2023-08-02 RX ORDER — SODIUM CHLORIDE 0.9 % (FLUSH) 0.9 %
3 SYRINGE (ML) INJECTION EVERY 12 HOURS SCHEDULED
OUTPATIENT
Start: 2023-08-02

## 2023-08-02 RX ORDER — SODIUM CHLORIDE 9 MG/ML
40 INJECTION, SOLUTION INTRAVENOUS AS NEEDED
OUTPATIENT
Start: 2023-08-02

## 2023-08-02 RX ORDER — SODIUM CHLORIDE 0.9 % (FLUSH) 0.9 %
10 SYRINGE (ML) INJECTION AS NEEDED
OUTPATIENT
Start: 2023-08-02

## 2023-08-12 NOTE — PROGRESS NOTES
Chief Complaint    Urologic complaint    Subjective          Osman Holman Jr. presents to [facility]  History of Present Illness       63-year-old  gentleman      Hypogonadism      8/23   BMP/CBC within normal limits,     Energy level and libido ok.  About the same      Patient does have aortic stenosis followed by Dr. Hirsch.  Recent echocardiogram    Voiding okay.    No prostate meds    No GH/UTI    Minimal caffeine      on Depo-Testosterone 175 mg (0.8ml)every 2 weeks IM at home. Refilled today      No cardiopulmonary history.  Patient does not smoke.  Patient does not use blood thinner.          Previous    Does not want a NP for rectal exam.  We did discuss risk and benefits of rectal exams at this time and unless we see increasing PSA will likely do no further rectal exam.    History of increased H/H, has donated blood periodically which is helped    Was on Depo testosterone testosterone injections 200 mg IM every 2 weeks.  Patient is followed by endocrinology secondary to his diabetes.  She found that his H&H was elevated and had him decrease his testosterone down to 175 mg (0.8 ml) every 2 weeks.  He has been doing okay on this and on repeat H/H he is back within normal limits.  No real changes to his fatigue or libido.          Total testosterone    8/23   360  1/23    278    7/22    382   2/22    316     7/21     93  1/21    251  1/20    400  11/18  393  11/17  504      main complaint initially was decreased energy and decreased libido.      No history of kidney stone.    No urologic family history,   Has never had any urologic surgery.      PSA    1/23    0.43  2/22     0.32     1/21     0.44  1/20     0.41  11/18   0.48  11/17   0.46  11/13   0.47      Past History:  Medical History: has a past medical history of Allergic rhinitis, Aortic stenosis, Arthritis, Carpal tunnel syndrome on both sides (CURRENTLY), Dermatitis, Diabetes, Diabetes mellitus type 2 with  complications, uncontrolled (01/30/2019), ED (erectile dysfunction), Essential hypertension, GERD (gastroesophageal reflux disease), Heart murmur, Hyperlipemia, Hyperlipidemia, Hypertension, Hypogonadism in male (01/30/2019), Nose irritation (02/27/2020), Nostril sore (02/27/2020), Thyroid disorder, and Vitamin D deficiency.   Surgical History: has a past surgical history that includes Cholecystectomy; Colonoscopy; and Carpal tunnel release (Bilateral, 10/12/2021).   Family History: family history includes Arthritis in his mother and sister; Diabetes in his father and another family member; Hypertension in his father, mother, sister, and another family member; Stroke in his father.   Social History: reports that he quit smoking about 28 years ago. His smoking use included cigarettes. He started smoking about 37 years ago. He has a 3.00 pack-year smoking history. He has been exposed to tobacco smoke. He has never used smokeless tobacco. He reports current alcohol use. He reports that he does not use drugs.  Allergies: Penicillins       Current Outpatient Medications:     amLODIPine (NORVASC) 5 MG tablet, Take 1 tablet by mouth Every Night., Disp: , Rfl:     aspirin 81 MG chewable tablet, Chew 1 tablet Daily. PT REPORTED TO STOP 1 WEEK PRIOR TO SURGERY, LAST DOSE 10/04/21per Dr. Copeland's instruction, Disp: , Rfl:     atorvastatin (LIPITOR) 40 MG tablet, Take 1 tablet by mouth Every Night., Disp: , Rfl:     cetirizine (zyrTEC) 10 MG tablet, Take 1 tablet by mouth Every Morning., Disp: , Rfl:     cholecalciferol (VITAMIN D3) 25 MCG (1000 UT) tablet, Take 1 tablet by mouth Daily., Disp: , Rfl:     Coenzyme Q10 100 MG tablet, Take 1 tablet by mouth Daily., Disp: , Rfl:     cyclobenzaprine (FLEXERIL) 10 MG tablet, Take 1 tablet by mouth 3 (Three) Times a Day As Needed for Muscle Spasms., Disp: 90 tablet, Rfl: 1    Diclofenac Sodium (VOLTAREN) 1 % gel gel, APPLY 4 GRAMS TO AFFECTED AREA FOUR TIMES A DAY AS NEEDED, Disp: ,  Rfl:     empagliflozin (JARDIANCE) 25 MG tablet tablet, Take 1 tablet by mouth Every Morning. Taking half tablet daily, Disp: , Rfl:     ezetimibe (ZETIA) 10 MG tablet, Take 1 tablet by mouth Daily., Disp: , Rfl:     fluticasone (FLONASE) 50 MCG/ACT nasal spray, 2 sprays into the nostril(s) as directed by provider Daily As Needed., Disp: , Rfl:     Fluticasone-Umeclidin-Vilant (Trelegy Ellipta) 200-62.5-25 MCG/ACT aerosol powder , Inhale 1 puff Daily., Disp: 3 each, Rfl: 3    glipizide (GLUCOTROL) 10 MG tablet, Take 2 tablets by mouth Daily Before Supper. INSTRUCTED PER ANESTHESIA STANDING ORDERS, Disp: , Rfl:     hydrocortisone 2.5 % cream, , Disp: , Rfl:     Ketoconazole 1 % shampoo, Apply  topically to the appropriate area as directed., Disp: , Rfl:     levothyroxine (SYNTHROID, LEVOTHROID) 50 MCG tablet, Take 1 tablet by mouth., Disp: , Rfl:     lisinopril-hydrochlorothiazide (PRINZIDE,ZESTORETIC) 10-12.5 MG per tablet, Take 1 tablet by mouth Every Morning. inst per anesthesia protocol, Disp: , Rfl:     meloxicam (MOBIC) 15 MG tablet, Take 1 tablet by mouth Daily As Needed. inst per anesthesia protocol, Disp: , Rfl:     metFORMIN ER (GLUCOPHAGE-XR) 500 MG 24 hr tablet, Take 1 tablet by mouth., Disp: , Rfl:     omeprazole (priLOSEC) 20 MG capsule, Take 1 capsule by mouth Daily As Needed., Disp: , Rfl:     Ozempic, 0.25 or 0.5 MG/DOSE, 2 MG/1.5ML solution pen-injector, , Disp: , Rfl:     Semaglutide,0.25 or 0.5MG/DOS, (Ozempic, 0.25 or 0.5 MG/DOSE,) 2 MG/1.5ML solution pen-injector, Inject 0.5 mg under the skin into the appropriate area as directed., Disp: , Rfl:     sildenafil (VIAGRA) 100 MG tablet, , Disp: , Rfl:     Testosterone Cypionate (DEPOTESTOTERONE CYPIONATE) 200 MG/ML injection, INJECT 0.88ML INTO THE APPROPRIATE MUSCLE AS DIRECTED BY PRESCRIBER EVERY 14 DAYS. DISCARD THE REMAINDER., Disp: 6 mL, Rfl: 1            Objective     Vital Signs:   There were no vitals taken for this visit.              Assessment and Plan    Diagnoses and all orders for this visit:    1. Hypogonadism in male (Primary)      Reviewed labs within normal limits    continue Depo-Testosterone 175 mg (0.8ml)  every 2 weeks IM at home. Refilled today.     using 1 mL vials and wasting the rest      Follow-up in 6 months with a CMP, CBC, PSA and total testosterone

## 2023-08-15 ENCOUNTER — OFFICE VISIT (OUTPATIENT)
Dept: UROLOGY | Facility: CLINIC | Age: 63
End: 2023-08-15
Payer: MEDICARE

## 2023-08-15 VITALS — BODY MASS INDEX: 31.18 KG/M2 | HEIGHT: 66 IN | WEIGHT: 194 LBS

## 2023-08-15 DIAGNOSIS — Z12.5 PROSTATE CANCER SCREENING: ICD-10-CM

## 2023-08-15 DIAGNOSIS — E29.1 HYPOGONADISM IN MALE: Primary | ICD-10-CM

## 2023-08-15 RX ORDER — TESTOSTERONE CYPIONATE 200 MG/ML
175 INJECTION, SOLUTION INTRAMUSCULAR
Qty: 6 ML | Refills: 1 | Status: SHIPPED | OUTPATIENT
Start: 2023-08-15

## 2023-10-30 ENCOUNTER — TELEPHONE (OUTPATIENT)
Dept: CARDIOLOGY | Facility: CLINIC | Age: 63
End: 2023-10-30
Payer: OTHER GOVERNMENT

## 2023-10-30 NOTE — TELEPHONE ENCOUNTER
Caller: Osman Holman Jr.     Relationship: [unfilled]     Best call back number: [phone]    What is your medical concern? PATIENT HAVING IRIS DONE AND IS ON OZEMPIC. PLEASE REACH OUT TO PATIENT AND ADVISE ON QUESTIONS HE HAS REGARDING IRIS AND TAKING MEDICATION    How long has this issue been going on? 10.30.23    Is your provider already aware of this issue? NO    Have you been treated for this issue? NO

## 2023-10-30 NOTE — TELEPHONE ENCOUNTER
I spoke to patient and gave an arrival time of 7:00 on 11/14/23 for IRIS. Patient was instructed to have a  for the day of the procedure and to arrive at the main entrance/registration area. Patient was instructed to continue all medications as usual. Patient was instructed to be NPO after midnight with sips of water as needed. Patient is agreeable with no other questions or concerns.

## 2023-11-14 ENCOUNTER — TELEPHONE (OUTPATIENT)
Dept: CARDIOLOGY | Facility: CLINIC | Age: 63
End: 2023-11-14

## 2023-11-14 ENCOUNTER — HOSPITAL ENCOUNTER (OUTPATIENT)
Dept: CARDIOLOGY | Facility: HOSPITAL | Age: 63
Discharge: HOME | End: 2023-11-14
Admitting: INTERNAL MEDICINE
Payer: OTHER GOVERNMENT

## 2023-11-14 VITALS
OXYGEN SATURATION: 95 % | RESPIRATION RATE: 16 BRPM | SYSTOLIC BLOOD PRESSURE: 117 MMHG | HEART RATE: 89 BPM | DIASTOLIC BLOOD PRESSURE: 73 MMHG

## 2023-11-14 DIAGNOSIS — I35.0 AORTIC STENOSIS, SEVERE: ICD-10-CM

## 2023-11-14 LAB
BH CV ECHO MEAS - AO MEAN PG: 48.8 MMHG
BH CV ECHO MEAS - AO V2 VTI: 86.2 CM
BH CV ECHO SHUNT ASSESSMENT PERFORMED (HIDDEN SCRIPTING): 1

## 2023-11-14 PROCEDURE — 93325 DOPPLER ECHO COLOR FLOW MAPG: CPT | Performed by: INTERNAL MEDICINE

## 2023-11-14 PROCEDURE — 93325 DOPPLER ECHO COLOR FLOW MAPG: CPT

## 2023-11-14 PROCEDURE — 93320 DOPPLER ECHO COMPLETE: CPT

## 2023-11-14 PROCEDURE — 25010000002 MEPERIDINE PER 100 MG: Performed by: INTERNAL MEDICINE

## 2023-11-14 PROCEDURE — 25010000002 DIPHENHYDRAMINE PER 50 MG: Performed by: INTERNAL MEDICINE

## 2023-11-14 PROCEDURE — 93312 ECHO TRANSESOPHAGEAL: CPT | Performed by: INTERNAL MEDICINE

## 2023-11-14 PROCEDURE — 93312 ECHO TRANSESOPHAGEAL: CPT

## 2023-11-14 PROCEDURE — 93320 DOPPLER ECHO COMPLETE: CPT | Performed by: INTERNAL MEDICINE

## 2023-11-14 PROCEDURE — 25010000002 MIDAZOLAM PER 1MG: Performed by: INTERNAL MEDICINE

## 2023-11-14 RX ORDER — DIPHENHYDRAMINE HYDROCHLORIDE 50 MG/ML
INJECTION INTRAMUSCULAR; INTRAVENOUS
Status: COMPLETED | OUTPATIENT
Start: 2023-11-14 | End: 2023-11-14

## 2023-11-14 RX ORDER — MIDAZOLAM HYDROCHLORIDE 2 MG/2ML
INJECTION, SOLUTION INTRAMUSCULAR; INTRAVENOUS
Status: COMPLETED | OUTPATIENT
Start: 2023-11-14 | End: 2023-11-14

## 2023-11-14 RX ORDER — MEPERIDINE HYDROCHLORIDE 25 MG/ML
INJECTION INTRAMUSCULAR; INTRAVENOUS; SUBCUTANEOUS
Status: COMPLETED | OUTPATIENT
Start: 2023-11-14 | End: 2023-11-14

## 2023-11-14 RX ADMIN — MIDAZOLAM HYDROCHLORIDE 4 MG: 1 INJECTION, SOLUTION INTRAMUSCULAR; INTRAVENOUS at 08:10

## 2023-11-14 RX ADMIN — MEPERIDINE HYDROCHLORIDE 25 MG: 25 INJECTION INTRAMUSCULAR; INTRAVENOUS; SUBCUTANEOUS at 08:19

## 2023-11-14 RX ADMIN — MEPERIDINE HYDROCHLORIDE 25 MG: 25 INJECTION INTRAMUSCULAR; INTRAVENOUS; SUBCUTANEOUS at 08:13

## 2023-11-14 RX ADMIN — MIDAZOLAM HYDROCHLORIDE 2 MG: 1 INJECTION, SOLUTION INTRAMUSCULAR; INTRAVENOUS at 08:18

## 2023-11-14 RX ADMIN — TOPICAL ANESTHETIC 1 SPRAY: 200 SPRAY DENTAL; PERIODONTAL at 08:10

## 2023-11-14 RX ADMIN — DIPHENHYDRAMINE HYDROCHLORIDE 50 MG: 50 INJECTION, SOLUTION INTRAMUSCULAR; INTRAVENOUS at 08:15

## 2023-11-14 RX ADMIN — MIDAZOLAM HYDROCHLORIDE 2 MG: 1 INJECTION, SOLUTION INTRAMUSCULAR; INTRAVENOUS at 08:22

## 2023-11-14 NOTE — DISCHARGE INSTRUCTIONS
Echo Lab Phone Number: [phone]    Do not eat or drink anything until 1010. Begin with sips of cool water before trying hot liquid to be sure throat numbness has worn off.  A responsible adult should drive you home and stay with you today and tonight.  Do not drive a car or operate any hazardous machinery for 24 hours.  Do not drink any alcoholic beverages for 24 hours.  Do not make any legal decisions for 24 hours after sedation.  Do not engage in any strenuous activity.  Resume your medications, following prescribed instructions.  Contact your caregiver if you have questions or concerns about your care.    In the event of an emergency, call 911 or go to your nearest emergency room.    You received the following medications during your procedure: Hurricane Spray, Versed, Demerol, and Benadryl.

## 2023-11-14 NOTE — H&P
Cardiology Consultation Note  [facility] CARDIOLOGY          Patient Identification:  Osman Holman Jr.      [id number]  63 y.o.        male  1960         PCP: Giovanni Gonzales MD      History of Present Illness:     Patient is a 63-year-old with a previous history of diabetes type 2 hypertension dyslipidemia and has had some mild shortness of breath symptoms when he overdoes it otherwise no chest pain syncope or issues with congestive heart failure.  His echocardiogram did reveal evidence of aortic stenosis which was severe as well as some LVH issues    Past History:  Past Medical History:   Diagnosis Date    Allergic rhinitis     Aortic stenosis     ASYPTOMATIC- SEE'S DR. PACK, DENIED CP/SOB    Arthritis     Carpal tunnel syndrome on both sides CURRENTLY    Dermatitis     Diabetes     TYPE II, BG RUNS UNDER 200 IN AM    Diabetes mellitus type 2 with complications, uncontrolled 01/30/2019    ED (erectile dysfunction)     Essential hypertension     GERD (gastroesophageal reflux disease)     Heart murmur     ASYMPTOMATIC- SEE'S DR PACK, DENIED CP/SOB    Hyperlipemia     Hyperlipidemia     Hypertension     Hypogonadism in male 01/30/2019    Nose irritation 02/27/2020    Nostril sore 02/27/2020    Thyroid disorder     Vitamin D deficiency      Past Surgical History:   Procedure Laterality Date    CARPAL TUNNEL RELEASE Bilateral 10/12/2021    Procedure: BILATERAL CARPAL TUNNEL RELEASE;  Surgeon: Ab Copeland MD;  Location: [facility] MAIN OR;  Service: Orthopedics;  Laterality: Bilateral;    CHOLECYSTECTOMY      COLONOSCOPY       Allergies   Allergen Reactions    Penicillins Itching     PT REPORTED CAN TAKE KEFLEX NO REACTIONS      Social History     Socioeconomic History    Marital status:    Tobacco Use    Smoking status: Former     Packs/day: 0.50     Years: 6.00     Additional pack years: 0.00     Total pack years: 3.00     Types: Cigarettes     Start date: 01/1986     Quit date: 01/1995      Years since quittin.8     Passive exposure: Past    Smokeless tobacco: Never    Tobacco comments:     0.5 ppd, smoked 6-10 years   Vaping Use    Vaping Use: Never used   Substance and Sexual Activity    Alcohol use: Yes     Comment: occasionally drinks    Drug use: Never    Sexual activity: Defer     Family History   Problem Relation Age of Onset    Hypertension Mother     Arthritis Mother     Stroke Father     Diabetes Father     Hypertension Father     Hypertension Sister     Arthritis Sister     Hypertension Other     Diabetes Other     Malig Hyperthermia Neg Hx      Medications:    benzocaine    diphenhydrAMINE    meperidine    meperidine    midazolam    midazolam    midazolam     Physical exam:    /84   Pulse 108   Resp 18   SpO2 99%  There is no height or weight on file to calculate BMI.   Oxygen saturation   @FLOWAN(10::1)@ SpO2  Min: 90 %  Max: 99 %    General Appearance:   no acute distress  HENT:   lips not cyanotic  Neck:  thyroid not enlarged  supple  Respiratory:  no respiratory distress  normal breath sounds  no rales  Cardiovascular:  no jugular venous distention  regular rhythm  apical impulse normal  S1 normal, S2 normal  no S3, no S4   no murmur  no rub, no thrill  no carotid bruit  pedal pulses normal  lower extremity edema: none    Gastrointestinal:   bowel sounds normal  non-tender  no hepatomegaly, no splenomegaly  Musculoskeletal:  no clubbing of fingers.   normocephalic, head atraumatic  Skin:   warm, dry  Neuro/Psychiatric:  judgement and insight appropriate  normal mood and affect    Cardiographics:   Results for orders placed during the hospital encounter of 23    Adult Transthoracic Echo Complete W/ Cont if Necessary Per Protocol    Interpretation Summary    Left ventricular systolic function is normal. Calculated left ventricular EF = 65.2%    Left ventricular wall thickness is consistent with mild to moderate concentric hypertrophy.    Left ventricular diastolic  "function was normal.    The right ventricular cavity is borderline dilated.    Severe aortic valve stenosis is present.      No results found for this or any previous visit.      Cardiolite (Tc-99m Sestamibi) stress test     Lab Review:           Invalid input(s): \"PLATELETCT\"                            CrCl cannot be calculated (Patient's most recent lab result is older than the maximum 30 days allowed.).         Invalid input(s): \"LDLCALC\"          Lab Results   Component Value Date    TSH 1.630 03/01/2021        Lab Results   Component Value Date    HGBA1C 7.50 (H) 01/20/2023      No results found for: \"DIGOXIN\"   No components found for: \"DDIMERQUAN\"       Assessment:      * No active hospital problems. *      Initial cardiac assessment:  aortic tenosis appears severe With underlying LVH question of some outflow obstruction as well recommend proceed with IRIS for further clarification of valve severity and not have any outflow obstruction.  Discussed risk benefits alternatives patient was agreeable proceeding      Recommendations:  1.  IRIS        Thank you for allowing us to share in Osman Holman Jr.  care.        Rudi Hirsch MD  11/14/2023  08:47 EST    "

## 2023-11-14 NOTE — TELEPHONE ENCOUNTER
Hub staff attempted to follow warm transfer process and was unsuccessful     Caller: Osman Holman Jr.    Relationship to patient: Self    Best call back number: [phone]    Patient is needing: PATIENT IS NEEDING TO KNOW THAT THE PROCEDURES AND TESTING FROM THE REFERRALS HAS BEEN BILLED TO [employer]  AND FOR APPROVAL SO THAT HE IS NOT TO RECEIVE A BILL FROM [employer] (PRIVATE INSURANCE). PLEASE CALL PATIENT TO ASSURE THEM THAT THEY ARE NOT BEING BILLED FROM [employer]. THIS IS AN URGENT MATTER THAT THE PATIENT IS TRYING TO RESOLVE BEFORE THE END OF BUSINESS TODAY.

## 2023-11-15 NOTE — TELEPHONE ENCOUNTER
I spoke to patient and gave him an overview of the referral and billing process. I believe we have this resolved.

## 2024-01-03 ENCOUNTER — TELEPHONE (OUTPATIENT)
Dept: UROLOGY | Facility: CLINIC | Age: 64
End: 2024-01-03
Payer: OTHER GOVERNMENT

## 2024-01-03 NOTE — TELEPHONE ENCOUNTER
PATIENT CALLED.  HE HAS 2 REFILLS ON TESTOSTERONE CYPIONATE 200 MG, BUT HE WAS UNABLE TO ORDER ON THE [facility] WEBSITE.      HE SAID HE RECEIVED A LETTER THAT CONTROLLED SUBSTANCES CANNOT BE ROLLED OVER FROM ONE YEAR TO THE NEXT.    HE ASKED FOR A NEW PRESCRIPTION TO BE SENT TO [facility].  THAT IS THE PHARMACY THAT HE USES THROUGH [facility].  HE SAID THE FAX NUMBER [phone].    HE SAID HE GET'S 90-DAY PRESCRIPTIONS

## 2024-01-04 DIAGNOSIS — E29.1 HYPOGONADISM IN MALE: ICD-10-CM

## 2024-01-04 RX ORDER — TESTOSTERONE CYPIONATE 200 MG/ML
175 INJECTION, SOLUTION INTRAMUSCULAR
Qty: 6 ML | Refills: 1 | Status: SHIPPED | OUTPATIENT
Start: 2024-01-04 | End: 2024-01-08 | Stop reason: SDUPTHER

## 2024-01-08 DIAGNOSIS — E29.1 HYPOGONADISM IN MALE: ICD-10-CM

## 2024-01-08 RX ORDER — TESTOSTERONE CYPIONATE 200 MG/ML
175 INJECTION, SOLUTION INTRAMUSCULAR
Qty: 6 ML | Refills: 1 | Status: SHIPPED | OUTPATIENT
Start: 2024-01-08

## 2024-01-08 NOTE — TELEPHONE ENCOUNTER
Patient called & said that his testosterone was sent into [facility]. It needs to be re-sent into [facility]

## 2024-01-18 ENCOUNTER — HOSPITAL ENCOUNTER (OUTPATIENT)
Dept: GENERAL RADIOLOGY | Facility: HOSPITAL | Age: 64
Discharge: HOME | End: 2024-01-18
Admitting: INTERNAL MEDICINE
Payer: MEDICARE

## 2024-01-18 ENCOUNTER — TRANSCRIBE ORDERS (OUTPATIENT)
Dept: GENERAL RADIOLOGY | Facility: HOSPITAL | Age: 64
End: 2024-01-18
Payer: OTHER GOVERNMENT

## 2024-01-18 DIAGNOSIS — K62.89 CHRONIC IDIOPATHIC ANAL PAIN: ICD-10-CM

## 2024-01-18 DIAGNOSIS — M25.50 CHRONIC JOINT PAIN: ICD-10-CM

## 2024-01-18 DIAGNOSIS — G89.29 CHRONIC IDIOPATHIC ANAL PAIN: ICD-10-CM

## 2024-01-18 DIAGNOSIS — G89.29 CHRONIC JOINT PAIN: ICD-10-CM

## 2024-01-18 DIAGNOSIS — M25.511 RIGHT SHOULDER PAIN, UNSPECIFIED CHRONICITY: Primary | ICD-10-CM

## 2024-01-18 DIAGNOSIS — M25.512 LEFT SHOULDER PAIN, UNSPECIFIED CHRONICITY: Primary | ICD-10-CM

## 2024-01-18 DIAGNOSIS — M25.512 LEFT SHOULDER PAIN, UNSPECIFIED CHRONICITY: ICD-10-CM

## 2024-01-18 PROCEDURE — 73030 X-RAY EXAM OF SHOULDER: CPT

## 2024-01-19 ENCOUNTER — TRANSCRIBE ORDERS (OUTPATIENT)
Dept: GENERAL RADIOLOGY | Facility: HOSPITAL | Age: 64
End: 2024-01-19
Payer: OTHER GOVERNMENT

## 2024-01-30 ENCOUNTER — TELEPHONE (OUTPATIENT)
Dept: ORTHOPEDIC SURGERY | Facility: CLINIC | Age: 64
End: 2024-01-30
Payer: OTHER GOVERNMENT

## 2024-01-30 NOTE — TELEPHONE ENCOUNTER
Caller: Osman Holman Jr.    Relationship: Self    Best call back number: [phone] (home)       Who is your current provider: PATIENT WAS ESTABLISHED WITH DR ADAIR    Is your current provider offboarding? NO    Who would you like your new provider to be: DR HURST    What are your reasons for transferring care: PATIENT SAID ITS NOTHING BAD. HIS WHOLE FAMILY SEES DR HURST SO HE WANTS TO SEE HIM TOO    Additional notes: NA

## 2024-02-01 ENCOUNTER — LAB (OUTPATIENT)
Dept: LAB | Facility: HOSPITAL | Age: 64
End: 2024-02-01
Payer: MEDICARE

## 2024-02-01 ENCOUNTER — TRANSCRIBE ORDERS (OUTPATIENT)
Dept: LAB | Facility: HOSPITAL | Age: 64
End: 2024-02-01
Payer: OTHER GOVERNMENT

## 2024-02-01 DIAGNOSIS — E11.65 TYPE 2 DIABETES MELLITUS WITH HYPERGLYCEMIA, UNSPECIFIED WHETHER LONG TERM INSULIN USE: ICD-10-CM

## 2024-02-01 DIAGNOSIS — I10 HYPERTENSION, UNSPECIFIED TYPE: ICD-10-CM

## 2024-02-01 DIAGNOSIS — E29.1 HYPOGONADISM IN MALE: ICD-10-CM

## 2024-02-01 DIAGNOSIS — E78.5 DYSLIPIDEMIA: ICD-10-CM

## 2024-02-01 DIAGNOSIS — Z12.5 PROSTATE CANCER SCREENING: ICD-10-CM

## 2024-02-01 DIAGNOSIS — E11.65 TYPE 2 DIABETES MELLITUS WITH HYPERGLYCEMIA, UNSPECIFIED WHETHER LONG TERM INSULIN USE: Primary | ICD-10-CM

## 2024-02-01 LAB
ALBUMIN SERPL-MCNC: 4.6 G/DL (ref 3.5–5.2)
ALBUMIN/GLOB SERPL: 1.6 G/DL
ALP SERPL-CCNC: 62 U/L (ref 39–117)
ALT SERPL W P-5'-P-CCNC: 32 U/L (ref 1–41)
ANION GAP SERPL CALCULATED.3IONS-SCNC: 13.9 MMOL/L (ref 5–15)
AST SERPL-CCNC: 23 U/L (ref 1–40)
BASOPHILS # BLD AUTO: 0.05 10*3/MM3 (ref 0–0.2)
BASOPHILS NFR BLD AUTO: 0.7 % (ref 0–1.5)
BILIRUB SERPL-MCNC: 0.6 MG/DL (ref 0–1.2)
BUN SERPL-MCNC: 13 MG/DL (ref 8–23)
BUN/CREAT SERPL: 14 (ref 7–25)
CALCIUM SPEC-SCNC: 9 MG/DL (ref 8.6–10.5)
CHLORIDE SERPL-SCNC: 98 MMOL/L (ref 98–107)
CO2 SERPL-SCNC: 24.1 MMOL/L (ref 22–29)
CREAT SERPL-MCNC: 0.93 MG/DL (ref 0.76–1.27)
DEPRECATED RDW RBC AUTO: 51 FL (ref 37–54)
EGFRCR SERPLBLD CKD-EPI 2021: 90.6 ML/MIN/1.73
EOSINOPHIL # BLD AUTO: 0.17 10*3/MM3 (ref 0–0.4)
EOSINOPHIL NFR BLD AUTO: 2.3 % (ref 0.3–6.2)
ERYTHROCYTE [DISTWIDTH] IN BLOOD BY AUTOMATED COUNT: 17 % (ref 12.3–15.4)
GLOBULIN UR ELPH-MCNC: 2.8 GM/DL
GLUCOSE SERPL-MCNC: 153 MG/DL (ref 65–99)
HBA1C MFR BLD: 8.2 % (ref 4.8–5.6)
HCT VFR BLD AUTO: 49.1 % (ref 37.5–51)
HGB BLD-MCNC: 16.2 G/DL (ref 13–17.7)
IMM GRANULOCYTES # BLD AUTO: 0.03 10*3/MM3 (ref 0–0.05)
IMM GRANULOCYTES NFR BLD AUTO: 0.4 % (ref 0–0.5)
LYMPHOCYTES # BLD AUTO: 1.29 10*3/MM3 (ref 0.7–3.1)
LYMPHOCYTES NFR BLD AUTO: 17.2 % (ref 19.6–45.3)
MCH RBC QN AUTO: 28.2 PG (ref 26.6–33)
MCHC RBC AUTO-ENTMCNC: 33 G/DL (ref 31.5–35.7)
MCV RBC AUTO: 85.5 FL (ref 79–97)
MONOCYTES # BLD AUTO: 0.59 10*3/MM3 (ref 0.1–0.9)
MONOCYTES NFR BLD AUTO: 7.9 % (ref 5–12)
NEUTROPHILS NFR BLD AUTO: 5.37 10*3/MM3 (ref 1.7–7)
NEUTROPHILS NFR BLD AUTO: 71.5 % (ref 42.7–76)
NRBC BLD AUTO-RTO: 0 /100 WBC (ref 0–0.2)
PLATELET # BLD AUTO: 185 10*3/MM3 (ref 140–450)
PMV BLD AUTO: 10.4 FL (ref 6–12)
POTASSIUM SERPL-SCNC: 4.3 MMOL/L (ref 3.5–5.2)
PROT SERPL-MCNC: 7.4 G/DL (ref 6–8.5)
PSA SERPL-MCNC: 0.32 NG/ML (ref 0–4)
RBC # BLD AUTO: 5.74 10*6/MM3 (ref 4.14–5.8)
SODIUM SERPL-SCNC: 136 MMOL/L (ref 136–145)
TESTOST SERPL-MCNC: 339 NG/DL (ref 193–740)
TSH SERPL DL<=0.05 MIU/L-ACNC: 3.05 UIU/ML (ref 0.27–4.2)
WBC NRBC COR # BLD AUTO: 7.5 10*3/MM3 (ref 3.4–10.8)

## 2024-02-01 PROCEDURE — 80053 COMPREHEN METABOLIC PANEL: CPT

## 2024-02-01 PROCEDURE — 85025 COMPLETE CBC W/AUTO DIFF WBC: CPT

## 2024-02-01 PROCEDURE — 83036 HEMOGLOBIN GLYCOSYLATED A1C: CPT

## 2024-02-01 PROCEDURE — 84443 ASSAY THYROID STIM HORMONE: CPT

## 2024-02-01 PROCEDURE — 84403 ASSAY OF TOTAL TESTOSTERONE: CPT

## 2024-02-01 PROCEDURE — G0103 PSA SCREENING: HCPCS

## 2024-02-01 PROCEDURE — 36415 COLL VENOUS BLD VENIPUNCTURE: CPT

## 2024-02-06 ENCOUNTER — OFFICE VISIT (OUTPATIENT)
Dept: ORTHOPEDIC SURGERY | Facility: CLINIC | Age: 64
End: 2024-02-06
Payer: MEDICARE

## 2024-02-06 VITALS
HEART RATE: 79 BPM | OXYGEN SATURATION: 95 % | BODY MASS INDEX: 30.73 KG/M2 | DIASTOLIC BLOOD PRESSURE: 79 MMHG | SYSTOLIC BLOOD PRESSURE: 145 MMHG | WEIGHT: 190.4 LBS

## 2024-02-06 DIAGNOSIS — M77.8 SHOULDER TENDONITIS, LEFT: ICD-10-CM

## 2024-02-06 DIAGNOSIS — M25.512 LEFT SHOULDER PAIN, UNSPECIFIED CHRONICITY: Primary | ICD-10-CM

## 2024-02-06 RX ORDER — TRIAMCINOLONE ACETONIDE 40 MG/ML
40 INJECTION, SUSPENSION INTRA-ARTICULAR; INTRAMUSCULAR
Status: COMPLETED | OUTPATIENT
Start: 2024-02-06 | End: 2024-02-06

## 2024-02-06 RX ORDER — LIDOCAINE HYDROCHLORIDE 10 MG/ML
5 INJECTION, SOLUTION INFILTRATION; PERINEURAL
Status: COMPLETED | OUTPATIENT
Start: 2024-02-06 | End: 2024-02-06

## 2024-02-06 RX ORDER — GLIPIZIDE 5 MG/1
5 TABLET, FILM COATED, EXTENDED RELEASE ORAL DAILY
COMMUNITY

## 2024-02-06 RX ADMIN — TRIAMCINOLONE ACETONIDE 40 MG: 40 INJECTION, SUSPENSION INTRA-ARTICULAR; INTRAMUSCULAR at 10:02

## 2024-02-06 RX ADMIN — LIDOCAINE HYDROCHLORIDE 5 ML: 10 INJECTION, SOLUTION INFILTRATION; PERINEURAL at 10:02

## 2024-02-06 NOTE — PROGRESS NOTES
Chief Complaint  Initial Evaluation of the Left Shoulder     Subjective      Osman Holman Jr. presents to [facility] for initial evaluation of the left shoulder. He mows and goes through a gate.  This time he reached back and grabbed the gate while on the mower.  He noted pain after that.  He states he is still having pain in the left shoulder.  He has pain with sleeping , external rotation and internal rotation. He had a IM injection a few weeks ago by PCP and ordered X rays.  He is here today for further treatment. He is about to get an aortic heart valve.     Allergies   Allergen Reactions    Penicillins Itching     PT REPORTED CAN TAKE KEFLEX NO REACTIONS         Social History     Socioeconomic History    Marital status:    Tobacco Use    Smoking status: Former     Packs/day: 0.50     Years: 6.00     Additional pack years: 0.00     Total pack years: 3.00     Types: Cigarettes     Start date: 1986     Quit date: 1995     Years since quittin.1     Passive exposure: Past    Smokeless tobacco: Never    Tobacco comments:     0.5 ppd, smoked 6-10 years   Vaping Use    Vaping Use: Never used   Substance and Sexual Activity    Alcohol use: Yes     Alcohol/week: 2.0 standard drinks of alcohol     Types: 2 Cans of beer per week     Comment: with meal    Drug use: Never    Sexual activity: Defer        I reviewed the patient's chief complaint, history of present illness, review of systems, past medical history, surgical history, family history, social history, medications, and allergy list.     Review of Systems     Constitutional: Denies fevers, chills, weight loss  Cardiovascular: Denies chest pain, shortness of breath  Skin: Denies rashes, acute skin changes  Neurologic: Denies headache, loss of consciousness        Vital Signs:   /79   Pulse 79   Wt 86.4 kg (190 lb 6.4 oz)   SpO2 95%   BMI 30.73 kg/m²          Physical Exam  General: Alert. No acute  distress    Ortho Exam        LEFT SHOULDER Forward flexion 170. Abduction 90. External rotation 60. Internal rotation L3. Positive Cross body adduction. Supraspinatus strength 4/5. Infraspinatus Strength 4/5. Infrared subscap 4/5. Positive Puckett. Positive Neer. Negative Apprehension. Negative Lift off. (Negative Obriens. Sensation intact to light touch, median, radial, ulnar nerve. Positive AIN, PIN, ulnar nerve motor. Positive pulses. Positive Impingement signs. Good strength in triceps, biceps, deltoid, wrist extensors and wrist flexors. Tender to palpation to the anterior aspect of the shoulder and down the arm.         Large Joint Arthrocentesis: L subacromial bursa  Date/Time: 2/6/2024 10:02 AM  Consent given by: patient  Site marked: site marked  Timeout: Immediately prior to procedure a time out was called to verify the correct patient, procedure, equipment, support staff and site/side marked as required   Supporting Documentation  Indications: pain   Procedure Details  Location: shoulder - L subacromial bursa  Needle gauge: 21G.  Medications administered: 5 mL lidocaine 1 %; 40 mg triamcinolone acetonide 40 MG/ML  Patient tolerance: patient tolerated the procedure well with no immediate complications        Imaging Results (Most Recent)       None             Result Review :         XR Shoulder 2+ View Left    Result Date: 1/18/2024  Narrative: PROCEDURE: XR SHOULDER 2+ VW LEFT  COMPARISON: None  INDICATIONS: LEFT SHOULDER PAIN S/P REACHING INJURY 4 MONTHS AGO  FINDINGS:  No fractures or dislocations.  Glenohumeral joint space is preserved.  Mild acromioclavicular joint space narrowing.  Small round there have increased density at the rotator cuff insertion site likely due to calcific tendinitis.  The visualized thorax is clear.      Impression:  Mild arthritis acromioclavicular joint.  Possible small deposits of calcium related to calcific tendinitis of the distal rotator cuff.      JORGE NANCE MD        Electronically Signed and Approved By: JORGE NANCE MD on 1/18/2024 at 15:29                     Assessment and Plan     Diagnoses and all orders for this visit:    1. Left shoulder pain, unspecified chronicity (Primary)    2. Calcific tendonitis, left        Discussed the treatment plan with the patient. I reviewed the X-rays that were obtained 1/18/24 with the patient.     Discussed the risks and benefits of conservative measures.  The patient expressed understanding and wished to proceed with a left shoulder steroid injection.  He tolerated the injection well.     HEP exercises. Use topical cream, not Mobic due to heart issues.      Call or return if worsening symptoms.    Follow Up     4-6 weeks. Assess If the injection and exercises are not helpful discuss MRI.        Patient was given instructions and counseling regarding his condition or for health maintenance advice. Please see specific information pulled into the AVS if appropriate.     Scribed for Gael Novak MD by Noris Edwards MA.  02/06/24   09:37 EST    I have personally performed the services described in this document as scribed by the above individual and it is both accurate and complete. Gael Novak MD 02/06/24

## 2024-02-07 ENCOUNTER — TELEPHONE (OUTPATIENT)
Dept: UROLOGY | Facility: CLINIC | Age: 64
End: 2024-02-07
Payer: OTHER GOVERNMENT

## 2024-02-07 NOTE — TELEPHONE ENCOUNTER
PT CALLED AND WANTS TO LET YOU KNOW TO PUT THE MEDICATION THAT WAS DISCUSSED EARLIER ON HOLD WITH LAM. [facility]. YOU CAN CALL HIM WITH QUESTIONS.

## 2024-02-08 ENCOUNTER — TELEPHONE (OUTPATIENT)
Dept: CARDIOLOGY | Facility: CLINIC | Age: 64
End: 2024-02-08
Payer: OTHER GOVERNMENT

## 2024-02-08 NOTE — TELEPHONE ENCOUNTER
The [facility] received a fax that requires your attention. The document has been indexed to the patient’s chart for your review.      Reason for sending: EXTERNAL MEDICAL RECORD NOTIFICATION     Documents Description: PHYS ORD-YISEL LAZARO RFS APPROVAL-2.8.24    Name of Sender: [facility]     Date Indexed: 2.8.24

## 2024-02-13 ENCOUNTER — OFFICE VISIT (OUTPATIENT)
Dept: UROLOGY | Facility: CLINIC | Age: 64
End: 2024-02-13
Payer: MEDICARE

## 2024-02-13 VITALS — HEIGHT: 66 IN | WEIGHT: 186 LBS | BODY MASS INDEX: 29.89 KG/M2

## 2024-02-13 DIAGNOSIS — E29.1 HYPOGONADISM IN MALE: Primary | ICD-10-CM

## 2024-02-13 PROCEDURE — 99213 OFFICE O/P EST LOW 20 MIN: CPT | Performed by: UROLOGY

## 2024-02-13 PROCEDURE — 1159F MED LIST DOCD IN RCRD: CPT | Performed by: UROLOGY

## 2024-02-13 PROCEDURE — 1160F RVW MEDS BY RX/DR IN RCRD: CPT | Performed by: UROLOGY

## 2024-02-22 ENCOUNTER — OFFICE VISIT (OUTPATIENT)
Dept: CARDIOLOGY | Facility: CLINIC | Age: 64
End: 2024-02-22
Payer: OTHER GOVERNMENT

## 2024-02-22 VITALS
DIASTOLIC BLOOD PRESSURE: 76 MMHG | BODY MASS INDEX: 30.31 KG/M2 | HEART RATE: 77 BPM | SYSTOLIC BLOOD PRESSURE: 144 MMHG | WEIGHT: 188.6 LBS | HEIGHT: 66 IN

## 2024-02-22 DIAGNOSIS — I35.0 AORTIC STENOSIS, SEVERE: Primary | ICD-10-CM

## 2024-02-22 DIAGNOSIS — I10 ESSENTIAL HYPERTENSION: ICD-10-CM

## 2024-02-22 DIAGNOSIS — E78.5 DYSLIPIDEMIA: ICD-10-CM

## 2024-02-22 PROCEDURE — 99214 OFFICE O/P EST MOD 30 MIN: CPT | Performed by: INTERNAL MEDICINE

## 2024-02-22 NOTE — ASSESSMENT & PLAN NOTE
Patient with controlled blood pressure at home mildly elevated in office today continue with Norvasc 5 mg once a day and lisinopril HCTZ 10/12.5 mg once

## 2024-02-22 NOTE — ASSESSMENT & PLAN NOTE
Patient with severe a stenosis and shortness of breath issues with exertion recommended proceeding with evaluation for aortic valve replacement currently being set up to be evaluated with Dr. Bush at the [facility].

## 2024-02-22 NOTE — PROGRESS NOTES
Chief Complaint  Hypertension, Follow-up, and Aortic Stenosis    Subjective    Patient symptomatically stable still reports some dyspnea on exertion but not severely affecting his life at this point.  He has not had any worsening breathing issues, chest pain problems, or syncopal episodes.  He denies any increased lower extremity edema    Past Medical History:   Diagnosis Date    Allergic rhinitis     Aortic stenosis     ASYPTOMATIC- SEE'S DR. PACK, DENIED CP/SOB    Arthritis     Carpal tunnel syndrome on both sides CURRENTLY    Dermatitis     Diabetes     TYPE II, BG RUNS UNDER 200 IN AM    Diabetes mellitus type 2 with complications, uncontrolled 01/30/2019    ED (erectile dysfunction)     Essential hypertension     GERD (gastroesophageal reflux disease)     Heart murmur     ASYMPTOMATIC- SEE'S DR PACK, DENIED CP/SOB    Hyperlipemia     Hyperlipidemia     Hypertension     Hypogonadism in male 01/30/2019    Nose irritation 02/27/2020    Nostril sore 02/27/2020    Thyroid disorder     Vitamin D deficiency          Current Outpatient Medications:     amLODIPine (NORVASC) 5 MG tablet, Take 1 tablet by mouth Every Night., Disp: , Rfl:     aspirin 81 MG chewable tablet, Chew 1 tablet Daily. PT REPORTED TO STOP 1 WEEK PRIOR TO SURGERY, LAST DOSE 10/04/21per Dr. Copeland's instruction, Disp: , Rfl:     atorvastatin (LIPITOR) 40 MG tablet, Take 1 tablet by mouth Every Night., Disp: , Rfl:     cetirizine (zyrTEC) 10 MG tablet, Take 1 tablet by mouth Every Morning., Disp: , Rfl:     cholecalciferol (VITAMIN D3) 25 MCG (1000 UT) tablet, Take 1 tablet by mouth Daily., Disp: , Rfl:     Coenzyme Q10 100 MG tablet, Take 1 tablet by mouth Daily., Disp: , Rfl:     cyclobenzaprine (FLEXERIL) 10 MG tablet, Take 1 tablet by mouth 3 (Three) Times a Day As Needed for Muscle Spasms., Disp: 90 tablet, Rfl: 1    Diclofenac Sodium (VOLTAREN) 1 % gel gel, APPLY 4 GRAMS TO AFFECTED AREA FOUR TIMES A DAY AS NEEDED, Disp: , Rfl:     empagliflozin  (JARDIANCE) 25 MG tablet tablet, Take 1 tablet by mouth Every Morning. Taking half tablet daily, Disp: , Rfl:     ezetimibe (ZETIA) 10 MG tablet, Take 1 tablet by mouth Daily., Disp: , Rfl:     fluticasone (FLONASE) 50 MCG/ACT nasal spray, 2 sprays into the nostril(s) as directed by provider Daily As Needed., Disp: , Rfl:     Fluticasone-Umeclidin-Vilant (Trelegy Ellipta) 200-62.5-25 MCG/ACT aerosol powder , Inhale 1 puff Daily., Disp: 3 each, Rfl: 3    glipizide (GLUCOTROL XL) 5 MG ER tablet, Take 2 tablets by mouth Daily., Disp: , Rfl:     hydrocortisone 2.5 % cream, , Disp: , Rfl:     Ketoconazole 1 % shampoo, Apply  topically to the appropriate area as directed., Disp: , Rfl:     levothyroxine (SYNTHROID, LEVOTHROID) 50 MCG tablet, Take 1 tablet by mouth., Disp: , Rfl:     lisinopril-hydrochlorothiazide (PRINZIDE,ZESTORETIC) 10-12.5 MG per tablet, Take 1 tablet by mouth Every Morning. inst per anesthesia protocol, Disp: , Rfl:     meloxicam (MOBIC) 15 MG tablet, Take 1 tablet by mouth Daily As Needed. inst per anesthesia protocol, Disp: , Rfl:     metFORMIN ER (GLUCOPHAGE-XR) 500 MG 24 hr tablet, Take 1 tablet by mouth., Disp: , Rfl:     omeprazole (priLOSEC) 20 MG capsule, Take 1 capsule by mouth Daily As Needed., Disp: , Rfl:     Semaglutide,0.25 or 0.5MG/DOS, (Ozempic, 0.25 or 0.5 MG/DOSE,) 2 MG/1.5ML solution pen-injector, Inject 1 mg under the skin into the appropriate area as directed., Disp: , Rfl:     sildenafil (VIAGRA) 100 MG tablet, , Disp: , Rfl:     Testosterone Cypionate (DEPOTESTOTERONE CYPIONATE) 200 MG/ML injection, Inject 0.88 mL into the appropriate muscle as directed by prescriber Every 14 (Fourteen) Days., Disp: 6 mL, Rfl: 1    There are no discontinued medications.  Allergies   Allergen Reactions    Penicillins Itching     PT REPORTED CAN TAKE KEFLEX NO REACTIONS         Social History     Tobacco Use    Smoking status: Former     Packs/day: 0.50     Years: 6.00     Additional pack years:  "0.00     Total pack years: 3.00     Types: Cigarettes     Start date: 1986     Quit date: 1995     Years since quittin.1     Passive exposure: Past    Smokeless tobacco: Never    Tobacco comments:     0.5 ppd, smoked 6-10 years   Vaping Use    Vaping Use: Never used   Substance Use Topics    Alcohol use: Yes     Alcohol/week: 2.0 standard drinks of alcohol     Types: 2 Cans of beer per week     Comment: with meal    Drug use: Never       Family History   Problem Relation Age of Onset    Hypertension Mother     Arthritis Mother     Stroke Father     Diabetes Father     Hypertension Father     Hypertension Sister     Arthritis Sister     Hypertension Other     Diabetes Other     Malig Hyperthermia Neg Hx         Objective     /76 (BP Location: Left arm)   Pulse 77   Ht 167.6 cm (66\")   Wt 85.5 kg (188 lb 9.6 oz)   BMI 30.44 kg/m²       Physical Exam    General Appearance:   no acute distress  Alert and oriented x3  HENT:   lips not cyanotic  Atraumatic  Neck:  No jvd   supple  Respiratory:  no respiratory distress  normal breath sounds  no rales  Cardiovascular:  Regular rate and rhythm  no S3, no S4   2 or 6 systolic late peaking murmur  no rub  Extremities  No cyanosis  lower extremity edema: none    Skin:   warm, dry  No rashes      Result Review :     No results found for: \"PROBNP\"  CMP          2023    07:25 2024    08:17   CMP   Glucose 162  153    BUN 13  13    Creatinine 1.01  0.93    EGFR 82.5  90.6    Sodium 139  136    Potassium 4.6  4.3    Chloride 103  98    Calcium 9.4  9.0    Total Protein 7.3  7.4    Albumin 4.4  4.6    Globulin 2.9  2.8    Total Bilirubin 0.6  0.6    Alkaline Phosphatase 55  62    AST (SGOT) 16  23    ALT (SGPT) 20  32    Albumin/Globulin Ratio 1.5  1.6    BUN/Creatinine Ratio 12.9  14.0    Anion Gap 12.6  13.9      CBC w/diff          2023    07:25 2024    08:17   CBC w/Diff   WBC 7.71  7.50    RBC 5.82  5.74    Hemoglobin 15.3  16.2  " "  Hematocrit 47.3  49.1    MCV 81.3  85.5    MCH 26.3  28.2    MCHC 32.3  33.0    RDW 16.4  17.0    Platelets 199  185    Neutrophil Rel %  71.5    Immature Granulocyte Rel %  0.4    Lymphocyte Rel %  17.2    Monocyte Rel %  7.9    Eosinophil Rel %  2.3    Basophil Rel %  0.7       Lab Results   Component Value Date    TSH 3.050 02/01/2024      No results found for: \"FREET4\"   No results found for: \"DDIMERQUANT\"  No results found for: \"MG\"   No results found for: \"DIGOXIN\"   No results found for: \"TROPONINT\"          No results found for: \"POCTROP\"    Results for orders placed during the hospital encounter of 11/14/23    Adult Transesophageal Echo ([facility]) W/ Cont if Necessary Per Protocol    Interpretation Summary    Left ventricular wall thickness is consistent with moderate concentric hypertrophy.    The left atrial cavity is mildly dilated.    Saline test results are negative.    Severe aortic valve stenosis is present.                 Diagnoses and all orders for this visit:    1. Aortic stenosis, severe (Primary)  Assessment & Plan:  Patient with severe a stenosis and shortness of breath issues with exertion recommended proceeding with evaluation for aortic valve replacement currently being set up to be evaluated with Dr. Bush at the [facility].      2. Essential hypertension  Assessment & Plan:  Patient with controlled blood pressure at home mildly elevated in office today continue with Norvasc 5 mg once a day and lisinopril HCTZ 10/12.5 mg once       3. Dyslipidemia            Follow Up     Return in about 6 months (around 8/22/2024).          Patient was given instructions and counseling regarding his condition or for health maintenance advice. Please see specific information pulled into the AVS if appropriate.       "

## 2024-03-12 ENCOUNTER — OFFICE VISIT (OUTPATIENT)
Dept: ORTHOPEDIC SURGERY | Facility: CLINIC | Age: 64
End: 2024-03-12
Payer: MEDICARE

## 2024-03-12 VITALS
DIASTOLIC BLOOD PRESSURE: 75 MMHG | BODY MASS INDEX: 30.22 KG/M2 | HEART RATE: 64 BPM | OXYGEN SATURATION: 96 % | SYSTOLIC BLOOD PRESSURE: 136 MMHG | WEIGHT: 188 LBS | HEIGHT: 66 IN

## 2024-03-12 DIAGNOSIS — M75.42 IMPINGEMENT SYNDROME OF LEFT SHOULDER: ICD-10-CM

## 2024-03-12 DIAGNOSIS — M77.8 SHOULDER TENDONITIS, LEFT: Primary | ICD-10-CM

## 2024-03-12 DIAGNOSIS — M25.512 LEFT SHOULDER PAIN, UNSPECIFIED CHRONICITY: ICD-10-CM

## 2024-03-12 RX ORDER — DICLOFENAC SODIUM 75 MG/1
75 TABLET, DELAYED RELEASE ORAL 2 TIMES DAILY
Qty: 60 TABLET | Refills: 0 | Status: SHIPPED | OUTPATIENT
Start: 2024-03-12

## 2024-03-12 RX ORDER — TRAMADOL HYDROCHLORIDE 50 MG/1
50 TABLET ORAL NIGHTLY PRN
Qty: 10 TABLET | Refills: 0 | Status: SHIPPED | OUTPATIENT
Start: 2024-03-12

## 2024-03-12 NOTE — PROGRESS NOTES
"Chief Complaint  Follow-up of the Left Shoulder     Subjective      Osman Holman Jr. presents to [facility] for a follow up for his left shoulder. Patient was last seen in the office on 24 where he received a left shoulder steroid injection. He states that the injection really didn't help much and states that he thinks he overdid it.     Allergies   Allergen Reactions    Penicillins Itching     PT REPORTED CAN TAKE KEFLEX NO REACTIONS         Social History     Socioeconomic History    Marital status:    Tobacco Use    Smoking status: Former     Current packs/day: 0.00     Average packs/day: 0.5 packs/day for 9.0 years (4.5 ttl pk-yrs)     Types: Cigarettes     Start date: 1986     Quit date: 1995     Years since quittin.2     Passive exposure: Past    Smokeless tobacco: Never    Tobacco comments:     0.5 ppd, smoked 6-10 years   Vaping Use    Vaping status: Never Used   Substance and Sexual Activity    Alcohol use: Yes     Alcohol/week: 2.0 standard drinks of alcohol     Types: 2 Cans of beer per week     Comment: with meal    Drug use: Never    Sexual activity: Defer        I reviewed the patient's chief complaint, history of present illness, review of systems, past medical history, surgical history, family history, social history, medications, and allergy list.     Review of Systems     Constitutional: Denies fevers, chills, weight loss  Cardiovascular: Denies chest pain, shortness of breath  Skin: Denies rashes, acute skin changes  Neurologic: Denies headache, loss of consciousness  MSK: Left shoulder pain       Vital Signs:   /75   Pulse 64   Ht 167.6 cm (66\")   Wt 85.3 kg (188 lb)   SpO2 96%   BMI 30.34 kg/m²          Physical Exam  General: Alert. No acute distress    Ortho Exam        Left upper extremity: forward elevation is full, positive impingement test, Abduction to 90 degrees with pain, sensation intact to the medial, radial and " ulnar nerve, positive Cross body adduction. Supraspinatus strength 4/5. Infraspinatus Strength 4/5. Infrared subscap 4/5. Positive Puckett. Positive Neer. Negative Apprehension. Negative Lift off.       Procedures        Imaging Results (Most Recent)       None             Result Review :       No results found.           Assessment and Plan     Diagnoses and all orders for this visit:    1. Shoulder tendonitis, left (Primary)  -     diclofenac (VOLTAREN) 75 MG EC tablet; Take 1 tablet by mouth 2 (Two) Times a Day.  Dispense: 60 tablet; Refill: 0  -     traMADol (ULTRAM) 50 MG tablet; Take 1 tablet by mouth At Night As Needed for Moderate Pain.  Dispense: 10 tablet; Refill: 0    2. Left shoulder pain, unspecified chronicity  -     diclofenac (VOLTAREN) 75 MG EC tablet; Take 1 tablet by mouth 2 (Two) Times a Day.  Dispense: 60 tablet; Refill: 0  -     traMADol (ULTRAM) 50 MG tablet; Take 1 tablet by mouth At Night As Needed for Moderate Pain.  Dispense: 10 tablet; Refill: 0    3. Impingement syndrome of left shoulder        The patient presents here today for a follow up for his left shoulder.     Sent in a prescription for Tramadol and Diclofenac to his pharmacy. May consider an MRI in the future if the symptoms persist.       Call or return if worsening symptoms.    Follow Up     PRN       Patient was given instructions and counseling regarding his condition or for health maintenance advice. Please see specific information pulled into the AVS if appropriate.     Scribed for Gael Novak MD by Ct Lin.  03/12/24   16:31 EDT        I have personally performed the services described in this document as scribed by the above individual and it is both accurate and complete. Gael Novak MD 03/13/24

## 2024-05-03 ENCOUNTER — TRANSCRIBE ORDERS (OUTPATIENT)
Dept: GENERAL RADIOLOGY | Facility: HOSPITAL | Age: 64
End: 2024-05-03
Payer: OTHER GOVERNMENT

## 2024-05-03 ENCOUNTER — HOSPITAL ENCOUNTER (OUTPATIENT)
Dept: GENERAL RADIOLOGY | Facility: HOSPITAL | Age: 64
Discharge: HOME | End: 2024-05-03
Admitting: INTERNAL MEDICINE
Payer: MEDICARE

## 2024-05-03 DIAGNOSIS — R05.1 ACUTE COUGH: Primary | ICD-10-CM

## 2024-05-03 DIAGNOSIS — R05.1 ACUTE COUGH: ICD-10-CM

## 2024-05-03 PROCEDURE — 71046 X-RAY EXAM CHEST 2 VIEWS: CPT

## 2024-06-03 ENCOUNTER — TELEPHONE (OUTPATIENT)
Dept: ORTHOPEDIC SURGERY | Facility: CLINIC | Age: 64
End: 2024-06-03
Payer: OTHER GOVERNMENT

## 2024-06-03 DIAGNOSIS — M25.512 LEFT SHOULDER PAIN, UNSPECIFIED CHRONICITY: Primary | ICD-10-CM

## 2024-06-03 NOTE — TELEPHONE ENCOUNTER
Provider: ARIS    Caller: VIVI    Relationship to Patient: SELF    Pharmacy:     Phone Number: [phone]    Reason for Call: PT IS CALLING TO GO AHEAD AND START TAKING CARE OF HIS SHOULDER AND WOULD LIKE TO HAVE AN MRI ORDERED - IF DR HURST FEELS THAT HE NEEDS OTHER TREATMENT PLEASE CALL AND DISCUSS    PT WOULD LIKE AN OPEN MRI

## 2024-07-09 ENCOUNTER — HOSPITAL ENCOUNTER (OUTPATIENT)
Dept: MRI IMAGING | Facility: HOSPITAL | Age: 64
Discharge: HOME | End: 2024-07-09
Admitting: ORTHOPAEDIC SURGERY
Payer: MEDICARE

## 2024-07-09 DIAGNOSIS — M25.512 LEFT SHOULDER PAIN, UNSPECIFIED CHRONICITY: ICD-10-CM

## 2024-07-09 PROCEDURE — 73221 MRI JOINT UPR EXTREM W/O DYE: CPT

## 2024-07-11 ENCOUNTER — OFFICE VISIT (OUTPATIENT)
Dept: CARDIAC REHAB | Facility: HOSPITAL | Age: 64
End: 2024-07-11
Payer: OTHER GOVERNMENT

## 2024-07-11 DIAGNOSIS — Z95.2 S/P TAVR (TRANSCATHETER AORTIC VALVE REPLACEMENT): Primary | ICD-10-CM

## 2024-07-12 ENCOUNTER — OFFICE VISIT (OUTPATIENT)
Dept: ORTHOPEDIC SURGERY | Facility: CLINIC | Age: 64
End: 2024-07-12
Payer: MEDICARE

## 2024-07-12 VITALS
WEIGHT: 190 LBS | DIASTOLIC BLOOD PRESSURE: 77 MMHG | BODY MASS INDEX: 30.53 KG/M2 | HEIGHT: 66 IN | SYSTOLIC BLOOD PRESSURE: 145 MMHG | HEART RATE: 69 BPM | OXYGEN SATURATION: 95 %

## 2024-07-12 DIAGNOSIS — M77.8 SHOULDER TENDONITIS, LEFT: ICD-10-CM

## 2024-07-12 DIAGNOSIS — M25.512 LEFT SHOULDER PAIN, UNSPECIFIED CHRONICITY: Primary | ICD-10-CM

## 2024-07-12 DIAGNOSIS — M19.012 OSTEOARTHRITIS OF LEFT SHOULDER, UNSPECIFIED OSTEOARTHRITIS TYPE: ICD-10-CM

## 2024-07-12 RX ORDER — TRAMADOL HYDROCHLORIDE 50 MG/1
50 TABLET ORAL EVERY 12 HOURS PRN
Qty: 14 TABLET | Refills: 0 | Status: SHIPPED | OUTPATIENT
Start: 2024-07-12

## 2024-07-12 RX ORDER — TRIAMCINOLONE ACETONIDE 40 MG/ML
40 INJECTION, SUSPENSION INTRA-ARTICULAR; INTRAMUSCULAR
Status: COMPLETED | OUTPATIENT
Start: 2024-07-12 | End: 2024-07-12

## 2024-07-12 RX ORDER — LIDOCAINE HYDROCHLORIDE 10 MG/ML
5 INJECTION, SOLUTION INFILTRATION; PERINEURAL
Status: COMPLETED | OUTPATIENT
Start: 2024-07-12 | End: 2024-07-12

## 2024-07-12 RX ADMIN — TRIAMCINOLONE ACETONIDE 40 MG: 40 INJECTION, SUSPENSION INTRA-ARTICULAR; INTRAMUSCULAR at 09:26

## 2024-07-12 RX ADMIN — LIDOCAINE HYDROCHLORIDE 5 ML: 10 INJECTION, SOLUTION INFILTRATION; PERINEURAL at 09:26

## 2024-07-12 NOTE — PROGRESS NOTES
"Chief Complaint  Follow-up of the Left Shoulder     Subjective      Osman Holman Jr. presents to [facility] for follow up of the left shoulder. She had a MRI on 24 and is here to review.  Patient was last seen in the office on 24 where he received a left shoulder steroid injection. He states that the injection really didn't help much and states that he thinks he overdid it.  He was given prescription for Diclofenac 3/12/24. . He had a heart valve surgery and that went well.  [facility] and [facility] [place] and he cannot do that drive. He has difficulty with sleeping.      Allergies   Allergen Reactions    Penicillins Itching     PT REPORTED CAN TAKE KEFLEX NO REACTIONS         Social History     Socioeconomic History    Marital status:    Tobacco Use    Smoking status: Former     Current packs/day: 0.00     Average packs/day: 0.5 packs/day for 9.0 years (4.5 ttl pk-yrs)     Types: Cigarettes     Start date: 1986     Quit date: 1995     Years since quittin.5     Passive exposure: Past    Smokeless tobacco: Never    Tobacco comments:     0.5 ppd, smoked 6-10 years   Vaping Use    Vaping status: Never Used   Substance and Sexual Activity    Alcohol use: Yes     Alcohol/week: 2.0 standard drinks of alcohol     Types: 2 Cans of beer per week     Comment: with meal    Drug use: Never    Sexual activity: Defer        I reviewed the patient's chief complaint, history of present illness, review of systems, past medical history, surgical history, family history, social history, medications, and allergy list.     Review of Systems     Constitutional: Denies fevers, chills, weight loss  Cardiovascular: Denies chest pain, shortness of breath  Skin: Denies rashes, acute skin changes  Neurologic: Denies headache, loss of consciousness      Vital Signs:   /77   Pulse 69   Ht 167.6 cm (66\")   Wt 86.2 kg (190 lb)   SpO2 95%   BMI 30.67 " kg/m²          Physical Exam  General: Alert. No acute distress    Ortho Exam        LEFT SHOULDER Forward flexion 180. Abduction 90. External rotation 60. Internal rotation L3. Positive Cross body adduction. Supraspinatus strength 4+/5. Infraspinatus Strength 5/5. Infrared subscap 5/5. Positive Puckett. Positive Neer. Negative Apprehension. Negative Lift off. (Negative Obriens. Sensation intact to light touch, median, radial, ulnar nerve. Positive AIN, PIN, ulnar nerve motor. Positive pulses. Positive Impingement signs. Good strength in triceps, biceps, deltoid, wrist extensors and wrist flexors. Tender to palpation to the anterior aspect of the shoulder and down the arm.        Large Joint  Date/Time: 7/12/2024 9:26 AM  Consent given by: patient  Site marked: site marked  Timeout: Immediately prior to procedure a time out was called to verify the correct patient, procedure, equipment, support staff and site/side marked as required   Supporting Documentation  Indications: pain   Procedure Details  Location: shoulder (LEFT) -   Preparation: Patient was prepped and draped in the usual sterile fashion  Needle gauge: 21 G.  Medications administered: 5 mL lidocaine 1 %; 40 mg triamcinolone acetonide 40 MG/ML  Patient tolerance: patient tolerated the procedure well with no immediate complications    This injection documentation was Scribed for Gael Novak MD by Enma Alvarez MA.  07/12/24   09:26 EDT      Imaging Results (Most Recent)       None             Result Review :         MRI Shoulder Left Without Contrast    Result Date: 7/10/2024  Narrative: MRI SHOULDER LEFT WO CONTRAST Date of Exam: 7/9/2024 10:44 AM EDT Indication: Left Shoulder Pain.  Comparison: Shoulder radiograph 1/18/2024 Technique:  Routine multiplanar/multisequence images of the left shoulder were obtained without contrast administration.  Findings: Rotator cuff: Supraspinatus tendon: Moderate distal tendinosis. No significant muscle atrophy.  Infraspinatus tendon: Mild distal tendinosis. Low signal intensity focus at the infraspinatus insertion site correspond to calcification seen on radiograph. Measures about 4 mm. Small calcific deposit. No significant muscle atrophy. Subscapularis tendon: No tendon abnormality. No significant muscle atrophy. Teres minor tendon: No tendon abnormality. No significant muscle atrophy. Glenohumeral joint: Humeral head is centrally located within the glenoid. Physiologic joint fluid is present. There is mild cartilage loss. The inferior joint capsule is normal thickness and normal signal intensity. Labrum: There is tearing of the labrum throughout. Multiloculated paralabral cyst are seen superiorly and anteriorly measures 1.6 cm. Biceps tendon: Long head of the biceps tendon is appropriately located within the bicipital groove. It has increased signal intensity of its intra-articular portion.. It inserts appropriately on the labrum. Acromioclavicular Joint: Moderate degenerative change. No abnormal bone marrow edema. No os acromiale. No significant lateral downsloping of the acromion. Bone: No fractures or aggressive osseous lesions. Bone marrow signal intensity is normal. Miscellaneous: No significant subacromial subdeltoid fluid. No pathologically enlarged axillary lymph nodes. Deltoid muscle has normal size and signal intensity.     Impression: 1.Mild glenohumeral joint Tritus with diffuse labral tearing and anterior superior paralabral cyst. 2.Moderate tendinosis of the supraspinatus tendon. Mild tendinosis of the infraspinatus tendon with small focus of calcific tendinitis at its insertion site. 3.Moderate arthritis acromioclavicular joint. 4.Tendinosis of the proximal long head biceps tendon. Electronically Signed: Myrna Smith MD  7/10/2024 1:41 PM EDT  Workstation ID: [id number]            Assessment and Plan     Diagnoses and all orders for this visit:    1. Left shoulder pain, unspecified chronicity (Primary)    2.  Shoulder tendonitis, left    3. Osteoarthritis of left shoulder, unspecified osteoarthritis type        Discussed the treatment plan with the patient. I reviewed the MRI results with the patient.     Discussed the risks and benefits of conservative measures. The patient expressed understanding and wished to proceed with a left shoulder steroid injection.  He tolerated the injection well.     Prescribed physical therapy.      Call or return if worsening symptoms.    Follow Up     4-6 weeks to assess ROM of the shoulder.       Patient was given instructions and counseling regarding his condition or for health maintenance advice. Please see specific information pulled into the AVS if appropriate.     Scribed for Gael Novak MD by Noris Edwards MA.  07/12/24   08:52 EDT    I have personally performed the services described in this document as scribed by the above individual and it is both accurate and complete. Gael Novak MD 07/12/24

## 2024-07-18 ENCOUNTER — LAB (OUTPATIENT)
Dept: LAB | Facility: HOSPITAL | Age: 64
End: 2024-07-18
Payer: MEDICARE

## 2024-07-18 ENCOUNTER — TRANSCRIBE ORDERS (OUTPATIENT)
Dept: LAB | Facility: HOSPITAL | Age: 64
End: 2024-07-18
Payer: OTHER GOVERNMENT

## 2024-07-18 DIAGNOSIS — E11.65 INADEQUATELY CONTROLLED DIABETES MELLITUS: Primary | ICD-10-CM

## 2024-07-18 DIAGNOSIS — E11.65 INADEQUATELY CONTROLLED DIABETES MELLITUS: ICD-10-CM

## 2024-07-18 DIAGNOSIS — E29.1 HYPOGONADISM IN MALE: ICD-10-CM

## 2024-07-18 LAB
ALBUMIN SERPL-MCNC: 4.4 G/DL (ref 3.5–5.2)
ALBUMIN UR-MCNC: <1.2 MG/DL
ALBUMIN/GLOB SERPL: 1.6 G/DL
ALP SERPL-CCNC: 50 U/L (ref 39–117)
ALT SERPL W P-5'-P-CCNC: 29 U/L (ref 1–41)
ANION GAP SERPL CALCULATED.3IONS-SCNC: 9.8 MMOL/L (ref 5–15)
AST SERPL-CCNC: 19 U/L (ref 1–40)
BASOPHILS # BLD AUTO: 0.07 10*3/MM3 (ref 0–0.2)
BASOPHILS NFR BLD AUTO: 0.6 % (ref 0–1.5)
BILIRUB SERPL-MCNC: 0.7 MG/DL (ref 0–1.2)
BUN SERPL-MCNC: 19 MG/DL (ref 8–23)
BUN/CREAT SERPL: 22.4 (ref 7–25)
CALCIUM SPEC-SCNC: 8.8 MG/DL (ref 8.6–10.5)
CHLORIDE SERPL-SCNC: 104 MMOL/L (ref 98–107)
CHOLEST SERPL-MCNC: 107 MG/DL (ref 0–200)
CO2 SERPL-SCNC: 24.2 MMOL/L (ref 22–29)
CREAT SERPL-MCNC: 0.85 MG/DL (ref 0.76–1.27)
CREAT UR-MCNC: 72.6 MG/DL
DEPRECATED RDW RBC AUTO: 47.7 FL (ref 37–54)
EGFRCR SERPLBLD CKD-EPI 2021: 95.8 ML/MIN/1.73
EOSINOPHIL # BLD AUTO: 0.14 10*3/MM3 (ref 0–0.4)
EOSINOPHIL NFR BLD AUTO: 1.2 % (ref 0.3–6.2)
ERYTHROCYTE [DISTWIDTH] IN BLOOD BY AUTOMATED COUNT: 16.1 % (ref 12.3–15.4)
FOLATE SERPL-MCNC: 15.1 NG/ML (ref 4.78–24.2)
GLOBULIN UR ELPH-MCNC: 2.7 GM/DL
GLUCOSE SERPL-MCNC: 128 MG/DL (ref 65–99)
HBA1C MFR BLD: 6.8 % (ref 4.8–5.6)
HCT VFR BLD AUTO: 46.6 % (ref 37.5–51)
HDLC SERPL-MCNC: 55 MG/DL (ref 40–60)
HGB BLD-MCNC: 14.6 G/DL (ref 13–17.7)
IMM GRANULOCYTES # BLD AUTO: 0.06 10*3/MM3 (ref 0–0.05)
IMM GRANULOCYTES NFR BLD AUTO: 0.5 % (ref 0–0.5)
LDLC SERPL CALC-MCNC: 39 MG/DL (ref 0–100)
LDLC/HDLC SERPL: 0.73 {RATIO}
LYMPHOCYTES # BLD AUTO: 1.96 10*3/MM3 (ref 0.7–3.1)
LYMPHOCYTES NFR BLD AUTO: 16.6 % (ref 19.6–45.3)
MCH RBC QN AUTO: 25.5 PG (ref 26.6–33)
MCHC RBC AUTO-ENTMCNC: 31.3 G/DL (ref 31.5–35.7)
MCV RBC AUTO: 81.3 FL (ref 79–97)
MICROALBUMIN/CREAT UR: NORMAL MG/G{CREAT}
MONOCYTES # BLD AUTO: 1.17 10*3/MM3 (ref 0.1–0.9)
MONOCYTES NFR BLD AUTO: 9.9 % (ref 5–12)
NEUTROPHILS NFR BLD AUTO: 71.2 % (ref 42.7–76)
NEUTROPHILS NFR BLD AUTO: 8.41 10*3/MM3 (ref 1.7–7)
NRBC BLD AUTO-RTO: 0 /100 WBC (ref 0–0.2)
PLATELET # BLD AUTO: 210 10*3/MM3 (ref 140–450)
PMV BLD AUTO: 10.1 FL (ref 6–12)
POTASSIUM SERPL-SCNC: 4.7 MMOL/L (ref 3.5–5.2)
PROT SERPL-MCNC: 7.1 G/DL (ref 6–8.5)
RBC # BLD AUTO: 5.73 10*6/MM3 (ref 4.14–5.8)
SODIUM SERPL-SCNC: 138 MMOL/L (ref 136–145)
T4 FREE SERPL-MCNC: 1.42 NG/DL (ref 0.92–1.68)
TESTOST SERPL-MCNC: 199 NG/DL (ref 193–740)
TRIGL SERPL-MCNC: 60 MG/DL (ref 0–150)
TSH SERPL DL<=0.05 MIU/L-ACNC: 3.69 UIU/ML (ref 0.27–4.2)
VIT B12 BLD-MCNC: 471 PG/ML (ref 211–946)
VLDLC SERPL-MCNC: 13 MG/DL (ref 5–40)
WBC NRBC COR # BLD AUTO: 11.81 10*3/MM3 (ref 3.4–10.8)

## 2024-07-18 PROCEDURE — 82043 UR ALBUMIN QUANTITATIVE: CPT

## 2024-07-18 PROCEDURE — 80053 COMPREHEN METABOLIC PANEL: CPT

## 2024-07-18 PROCEDURE — 83036 HEMOGLOBIN GLYCOSYLATED A1C: CPT

## 2024-07-18 PROCEDURE — 80061 LIPID PANEL: CPT

## 2024-07-18 PROCEDURE — 82570 ASSAY OF URINE CREATININE: CPT

## 2024-07-18 PROCEDURE — 82746 ASSAY OF FOLIC ACID SERUM: CPT

## 2024-07-18 PROCEDURE — 84439 ASSAY OF FREE THYROXINE: CPT

## 2024-07-18 PROCEDURE — 84403 ASSAY OF TOTAL TESTOSTERONE: CPT

## 2024-07-18 PROCEDURE — 84443 ASSAY THYROID STIM HORMONE: CPT

## 2024-07-18 PROCEDURE — 82607 VITAMIN B-12: CPT

## 2024-07-18 PROCEDURE — 85025 COMPLETE CBC W/AUTO DIFF WBC: CPT

## 2024-07-18 PROCEDURE — 36415 COLL VENOUS BLD VENIPUNCTURE: CPT

## 2024-08-07 ENCOUNTER — TREATMENT (OUTPATIENT)
Dept: PHYSICAL THERAPY | Facility: CLINIC | Age: 64
End: 2024-08-07
Payer: OTHER GOVERNMENT

## 2024-08-07 DIAGNOSIS — M25.612 DECREASED ROM OF LEFT SHOULDER: ICD-10-CM

## 2024-08-07 DIAGNOSIS — M62.81 MUSCLE WEAKNESS OF LEFT UPPER EXTREMITY: ICD-10-CM

## 2024-08-07 DIAGNOSIS — G89.29 CHRONIC LEFT SHOULDER PAIN: Primary | ICD-10-CM

## 2024-08-07 DIAGNOSIS — M25.512 CHRONIC LEFT SHOULDER PAIN: Primary | ICD-10-CM

## 2024-08-07 NOTE — PROGRESS NOTES
Physical Therapy Initial Evaluation and Plan of Care      [facility]: [address] [place] [address]      Patient: Osman Holman Jr.   : 1960  Diagnosis/ICD-10 Code:  Chronic left shoulder pain [M25.512, G89.29]  Referring practitioner: Gael Novak MD  Date of Initial Visit: 2024  Today's Date: 2024  Patient seen for 1 sessions           Subjective Questionnaire: QuickDASH: 33      Subjective   Pt reports to physical therapy w/ complaints of L shoulder pain. Pt reports about a year ago they were reaching behind them for a gait. Since then they have increased pain. Pt reports their pain can increase when they are laying down, lifting their arm out to the side and behind them can increase pain. Pt has had 2 injections in their L shoulder, which has now started to help. Pt has also been able to use some aspirin and topicals to help w/ some of their levels of pain.       Pt occupation: retired     Current Pain: 1/10  Best Pain: 1/10  Worst Pain: 4-5/10    Past Medical Hx: 2024 TAVR (transcatheter aortic valve replacement), DM, HTN, carpal tunnel B (surgical intervention), some neck pain in the past      MRI IMPRESSION:  1.Mild glenohumeral joint Tritus with diffuse labral tearing and anterior superior paralabral cyst.  2.Moderate tendinosis of the supraspinatus tendon. Mild tendinosis of the infraspinatus tendon with small focus of calcific tendinitis at its insertion site.  3.Moderate arthritis acromioclavicular joint.  4.Tendinosis of the proximal long head biceps tendon.     Electronically Signed: Myrna Smith MD        Objective          Cervical/Thoracic Screen   Cervical range of motion within normal limits  Cervical range of motion within normal limits with the following exceptions: No familiar shoulder pain w/ cervical screen.     Neurological Testing     Sensation   Cervical/Thoracic   Left   Intact: light touch    Right   Intact: light touch    Additional Neurological  "Details  \"Carpal tunnel\" in B hands. Intact light touch sensation in B UEs, consistent w/ dermatomes C4-T1.     Active Range of Motion   Left Shoulder   Flexion: 145 degrees with pain  Abduction: 115 degrees with pain  External rotation BTH: T1 with pain  Internal rotation BTB: L5 with pain    Right Shoulder   Flexion: 160 degrees   Abduction: 160 degrees   External rotation BTH: T3   Internal rotation BTB: L1     Passive Range of Motion   Left Shoulder   Flexion: 145 degrees with pain  Abduction: 125 degrees with pain    Additional Passive Range of Motion Details  Endfeel is soft to firm     Strength/Myotome Testing     Left Shoulder     Planes of Motion   Flexion: 4   Abduction: 3+   External rotation at 0°: 4   Internal rotation at 0°: 4+     Isolated Muscles   Biceps: 5     Right Shoulder     Planes of Motion   Flexion: 5   Abduction: 5   External rotation at 0°: 5   Internal rotation at 0°: 5     Isolated Muscles   Biceps: 5     Tests   Cervical     Left   Positive active compression ([place]).     Left Shoulder   Positive Neer's.   Negative empty can.       See Exercise, Manual, and Modality Logs for complete treatment.       Assessment & Plan       Assessment  Impairments: abnormal coordination, abnormal muscle firing, abnormal or restricted ROM, activity intolerance, impaired physical strength, lacks appropriate home exercise program and pain with function   Functional limitations: carrying objects, lifting, sleeping, pulling, pushing, reaching behind back, reaching overhead and unable to perform repetitive tasks   Assessment details: Pt reports to physical therapy w/ complaints of L shoulder pain. Pt presents w/ decreased ROM, decreased strength, and pain. Pt has increased perceived deficits described by QuickDASH. Pt is currently limited in performing repeated UE tasks, overhead reaching, and BTB reaching tasks due to increased pain. Pt was educated on their HEP. Pt will benefit from skilled physical " therapy, to address their current impairments and limitations, in order to improve upon their ability to perform reaching tasks and ADLs safely and without restrictions.       Prognosis: good    Goals  Plan Goals: SHOULDER  PROBLEMS:     1. The patient has limited ROM of the L shoulder.    LTG 1: 12 weeks:  The patient will demonstrate 160 degrees of L shoulder flexion, 160 degrees of shoulder abduction, to T4 with overhead external rotation, and to T12 with behind their back internal rotation to allow the patient to perform all dressing tasks without restrictions.    STATUS:  New   STG 1a: 6 weeks:  The patient will demonstrate 155 degrees of L shoulder flexion, 140 degrees of shoulder abduction, to T3 with overhead external rotation, and to L3 with behind their back internal rotation to allow the patient to perform all dressing tasks without restrictions.    STATUS:  New   TREATMENT: Manual therapy, therapeutic exercise, home exercise instruction, and modalities as needed to include: electrical stimulation, moist heat, and ice.    2. The patient has limited strength of the L shoulder.   LTG 2: 12 weeks:  The patient will demonstrate 5 /5 strength for L shoulder flexion, abduction, external rotation, and internal rotation in order to demonstrate improved shoulder stability.    STATUS:  New   STG 2a: 6 weeks:  The patient will demonstrate 4+ /5 strength for L shoulder flexion, abduction, external rotation, and internal rotation.    STATUS:  New   STG2b:  6 weeks:  The patient will be independent with home exercises.     STATUS:  New   TREATMENT: Manual therapy, therapeutic exercise, home exercise instruction, and modalities as needed to include: electrical stimulation, moist heat, and ice.     3. The patient complains of pain to the L shoulder.   LTG 3:12 weeks:  The patient will report a pain rating of 0 /10 or better in order to improve sleep quality and tolerance to performance of activities of daily  living.    STATUS:  New   STG 3a: 6 weeks:  The patient will report a pain rating of 2 /10 or better.     STATUS:  New   TREATMENT: Manual therapy, therapeutic exercise, home exercise instruction, and modalities as needed to include: electrical stimulation, moist heat, and ice.    4. Carrying, Moving, and Handling Objects Functional Limitation     LTG 4: 12 weeks:  The patient will demonstrate 9 % limitation by achieving a score of 15 on the QuickDASH.    STATUS:  New   STG 4a: 6 weeks:  The patient will demonstrate 32 % limitation by achieving a score of 25 on the QuickDASH.      STATUS:  New   TREATMENT:  Manual therapy, therapeutic exercise, home exercise instruction, and modalities as needed to include: moist heat and electrical stimulation.            PLAN:  Therapy options: will receive skilled therapy services  Planned modality interventions: Cryotherapy, Heat, and TENS  Planned therapy interventions:ADL retraining, soft tissue mobilization, strengthening, stretching, therapeutic activities, manual therapy, joint mobilization, home exercise program/patient education, functional ROM exercises, flexibility, body mechanics training, postural training, and neuromuscular re-education  Frequency: 2x per week  Duration in weeks: 12  Treatment plan discussed with: patient      Visit Diagnoses:    ICD-10-CM ICD-9-CM   1. Chronic left shoulder pain  M25.512 719.41    G89.29 338.29   2. Decreased ROM of left shoulder  M25.612 719.51   3. Muscle weakness of left upper extremity  M62.81 728.87       History # of Personal Factors and/or Comorbidities: MODERATE (1-2)  Examination of Body System(s): # of elements: LOW (1-2)  Clinical Presentation: STABLE   Clinical Decision Making: LOW       Timed:         Manual Therapy:    0     mins  20036;     Therapeutic Exercise:    25     mins  10745;     Neuromuscular Otoniel:    0    mins  44166;    Therapeutic Activity:     0     mins  96742;     Gait Trainin     mins  75186;      Ultrasound:     0     mins  48266;    Ionto                               0    mins   27298  Self Care                       0     mins   56472  Canalith Repos    0     mins 81874      Un-Timed:  Electrical Stimulation:    0     mins  14194 ( );  Dry Needling     0     mins self-pay  Traction     0     mins 78666  Low Eval     20     Mins  06900  Mod Eval     0     Mins  19914  High Eval                       0     Mins  46695  Re-Eval                           0    mins  96505    Timed Treatment:   25   mins   Total Treatment:     45   mins    PT SIGNATURE: Gabino Trujillo PT, DPT    Electronically signed 8/7/2024    [place] License: PT - [id number]    Initial Certification  Certification Period: 8/7/2024 thru 11/4/2024  I certify that the therapy services are furnished while this patient is under my care.  The services outlined above are required by this patient, and will be reviewed every 90 days.     PHYSICIAN: Gael Novak MD  NPI: [id number]      DATE:     Please sign and return via fax to [phone]. Thank you, [facility].

## 2024-08-10 NOTE — PROGRESS NOTES
Chief Complaint    Urologic complaint    Subjective            Osman Holman Jr. presents to [facility]  History of Present Illness       64-year-old  gentleman        Hypogonadism       on Depo-Testosterone 175 mg (0.8ml)every 2 weeks IM at home.     [facility]    Libido okay, energy a little low    He is wanting to continue to see me to have labs and be checked.    Voiding okay.    No prostate meds    7/24   H/H ok/  0.8  GFR  95  TT  199    2/24 0.9, GFR 90    ,  ,   H/H 16/49       Energy level and libido ok.  Unchanged.      Patient has been having a problem with his mail order pharmacy.  Having a very hard time getting his testosterone he is decided [facility]    At this time [facility] has [facility].      Currently on Ozempic       No GH/dysuria      2024 bovine aortic valve replacement  No CAD   patient does not smoke.    Aspirin 81          Previous    Does not want a NP for rectal exam.      History of increased H/H, has donated blood periodically which is helped    Was on Depo testosterone testosterone injections 200 mg IM every 2 weeks.  Patient is followed by endocrinology secondary to his diabetes.  She found that his H&H was elevated and had him decrease his testosterone down to 175 mg (0.8 ml) every 2 weeks.  He has been doing okay on this and on repeat H/H he is back within normal limits.  No real changes to his fatigue or libido.          Total testosterone    2/24   339  8/23   360  1/23    278    7/22    382   2/22    316     7/21     93  1/21    251  1/20    400  11/18  393  11/17  504      No history of kidney stone.    No urologic family history,   Has never had any urologic surgery.      PSA    2/24 0.316  1/23    0.43  2/22     0.32     1/21     0.44  1/20     0.41  11/18   0.48  11/17   0.46  11/13   0.47      Past History:  Medical History: has a past medical history of  Allergic rhinitis, Aortic stenosis, Arthritis, Carpal tunnel syndrome on both sides (CURRENTLY), Dermatitis, Diabetes, Diabetes mellitus type 2 with complications, uncontrolled (01/30/2019), ED (erectile dysfunction), Essential hypertension, GERD (gastroesophageal reflux disease), Heart murmur, Hyperlipemia, Hyperlipidemia, Hypertension, Hypogonadism in male (01/30/2019), Nose irritation (02/27/2020), Nostril sore (02/27/2020), Thyroid disorder, and Vitamin D deficiency.   Surgical History: has a past surgical history that includes Cholecystectomy; Colonoscopy; Carpal tunnel release (Bilateral, 10/12/2021); and Wrist surgery.   Family History: family history includes Arthritis in his mother and sister; Diabetes in his father and another family member; Hypertension in his father, mother, sister, and another family member; Stroke in his father.   Social History: reports that he quit smoking about 29 years ago. His smoking use included cigarettes. He started smoking about 38 years ago. He has a 4.5 pack-year smoking history. He has been exposed to tobacco smoke. He has never used smokeless tobacco. He reports current alcohol use of about 2.0 standard drinks of alcohol per week. He reports that he does not use drugs.  Allergies: Penicillins       Current Outpatient Medications:     amLODIPine (NORVASC) 5 MG tablet, Take 1 tablet by mouth Every Night., Disp: , Rfl:     aspirin 81 MG chewable tablet, Chew 1 tablet Daily. PT REPORTED TO STOP 1 WEEK PRIOR TO SURGERY, LAST DOSE 10/04/21per Dr. Copeland's instruction, Disp: , Rfl:     atorvastatin (LIPITOR) 40 MG tablet, Take 1 tablet by mouth Every Night., Disp: , Rfl:     azithromycin (Zithromax Z-Robert) 250 MG tablet, Take 2 PO today then take 1 daily on days 2-5, Disp: 6 tablet, Rfl: 0    cetirizine (zyrTEC) 10 MG tablet, Take 1 tablet by mouth Every Morning., Disp: , Rfl:     cholecalciferol (VITAMIN D3) 25 MCG (1000 UT) tablet, Take 1 tablet by mouth Daily., Disp: , Rfl:      Coenzyme Q10 100 MG tablet, Take 1 tablet by mouth Daily., Disp: , Rfl:     cyclobenzaprine (FLEXERIL) 10 MG tablet, Take 1 tablet by mouth 3 (Three) Times a Day As Needed for Muscle Spasms., Disp: 90 tablet, Rfl: 1    diclofenac (VOLTAREN) 75 MG EC tablet, Take 1 tablet by mouth 2 (Two) Times a Day., Disp: 60 tablet, Rfl: 0    Diclofenac Sodium (VOLTAREN) 1 % gel gel, APPLY 4 GRAMS TO AFFECTED AREA FOUR TIMES A DAY AS NEEDED, Disp: , Rfl:     empagliflozin (JARDIANCE) 25 MG tablet tablet, Take 1 tablet by mouth Every Morning. Taking half tablet daily, Disp: , Rfl:     ezetimibe (ZETIA) 10 MG tablet, Take 1 tablet by mouth Daily., Disp: , Rfl:     FLUoxetine (PROzac) 20 MG capsule, Take 1 capsule by mouth Daily., Disp: , Rfl:     fluticasone (FLONASE) 50 MCG/ACT nasal spray, 2 sprays into the nostril(s) as directed by provider Daily As Needed., Disp: , Rfl:     Fluticasone-Umeclidin-Vilant (Trelegy Ellipta) 200-62.5-25 MCG/ACT aerosol powder , Inhale 1 puff Daily., Disp: 3 each, Rfl: 3    glipizide (GLUCOTROL XL) 5 MG ER tablet, Take 2 tablets by mouth Daily., Disp: , Rfl:     hydrocortisone 2.5 % cream, , Disp: , Rfl:     Ketoconazole 1 % shampoo, Apply  topically to the appropriate area as directed., Disp: , Rfl:     levothyroxine (SYNTHROID, LEVOTHROID) 50 MCG tablet, Take 1 tablet by mouth., Disp: , Rfl:     lisinopril-hydrochlorothiazide (PRINZIDE,ZESTORETIC) 10-12.5 MG per tablet, Take 1 tablet by mouth Every Morning. inst per anesthesia protocol, Disp: , Rfl:     meloxicam (MOBIC) 15 MG tablet, Take 1 tablet by mouth Daily As Needed. inst per anesthesia protocol, Disp: , Rfl:     metFORMIN ER (GLUCOPHAGE-XR) 500 MG 24 hr tablet, Take 1 tablet by mouth., Disp: , Rfl:     omeprazole (priLOSEC) 20 MG capsule, Take 1 capsule by mouth Daily As Needed., Disp: , Rfl:     Semaglutide, 1 MG/DOSE, (Ozempic, 1 MG/DOSE,) 2 MG/1.5ML solution pen-injector, Inject 1 mg under the skin into the appropriate area as directed.,  Disp: , Rfl:     sildenafil (VIAGRA) 25 MG tablet, Take 1 tablet by mouth., Disp: , Rfl:     sodium chloride 0.65 % nasal spray, 4 (Four) Times a Day., Disp: , Rfl:     Testosterone Cypionate (DEPOTESTOTERONE CYPIONATE) 200 MG/ML injection, Inject 0.88 mL into the appropriate muscle as directed by prescriber Every 14 (Fourteen) Days., Disp: 6 mL, Rfl: 1    traMADol (ULTRAM) 50 MG tablet, Take 1 tablet by mouth Every 12 (Twelve) Hours As Needed for Moderate Pain., Disp: 14 tablet, Rfl: 0            Objective     Vital Signs:   There were no vitals taken for this visit.             Assessment and Plan    Diagnoses and all orders for this visit:    1. Hypogonadism in male (Primary)      Reviewed labs again - normal.    continue Depo-Testosterone 175 mg (0.8ml)  every 2 weeks IM at home. [facility].    [facility] is [facility] 6 months    Wanting yearly urology check appointments.  Follow-up in 1 year with CBC, total testosterone, PSA and CMP.

## 2024-08-12 ENCOUNTER — TREATMENT (OUTPATIENT)
Dept: PHYSICAL THERAPY | Facility: CLINIC | Age: 64
End: 2024-08-12
Payer: OTHER GOVERNMENT

## 2024-08-12 DIAGNOSIS — M62.81 MUSCLE WEAKNESS OF LEFT UPPER EXTREMITY: ICD-10-CM

## 2024-08-12 DIAGNOSIS — G89.29 CHRONIC LEFT SHOULDER PAIN: Primary | ICD-10-CM

## 2024-08-12 DIAGNOSIS — M25.512 CHRONIC LEFT SHOULDER PAIN: Primary | ICD-10-CM

## 2024-08-12 DIAGNOSIS — M25.612 DECREASED ROM OF LEFT SHOULDER: ICD-10-CM

## 2024-08-12 NOTE — PROGRESS NOTES
"Physical Therapy Daily Treatment Note  [place] [facility] [address] [place] [address]    Patient: Osman Holman Jr.   : 1960  Referring practitioner: No ref. provider found  Date of Initial Visit: Type: THERAPY  Noted: 2024  Today's Date: 2024  Patient seen for 2 sessions           Subjective  Osman Holman reports: \"The third shot seemed to help.\" \"Still feel it, not quite as bad, when I reach behind my back, when I try to sleep, and usual daily activities.\" Can be 2/10 at rest but gets up to 5-6/10        Objective   See Exercise, Manual, and Modality Logs for complete treatment.       Assessment/Plan  Jermaine, as he told PTA that he goes by, noted he is not having difficulty with HEP. Reviewed and performed all HEP today. Ongoing need for skilled care as outlined.     Visit Diagnoses:    ICD-10-CM ICD-9-CM   1. Chronic left shoulder pain  M25.512 719.41    G89.29 338.29   2. Decreased ROM of left shoulder  M25.612 719.51   3. Muscle weakness of left upper extremity  M62.81 728.87       Progress per Plan of Care and Progress strengthening /stabilization /functional activity           Timed:  Manual Therapy:         mins  73880;  Therapeutic Exercise:         mins  33980;     Neuromuscular Otoniel:    10    mins  55898;    Therapeutic Activity:     14     mins  22754;     Gait Training:           mins  70431;     Ultrasound:          mins  36260;    Electrical Stimulation:         mins  43113 ( );  Aquatics  __   mins   66541    Untimed:  Electrical Stimulation:         mins  04594 ( );  Mechanical Traction:         mins  77511;     Timed Treatment:   24   mins   Total Treatment:     24   mins    Electronically Signed:  Angela Burgess PTA  Physical Therapist Assistant    [place] PTA license [id number]            "

## 2024-08-13 ENCOUNTER — OFFICE VISIT (OUTPATIENT)
Dept: UROLOGY | Facility: CLINIC | Age: 64
End: 2024-08-13
Payer: MEDICARE

## 2024-08-13 VITALS — WEIGHT: 190 LBS | HEIGHT: 66 IN | BODY MASS INDEX: 30.53 KG/M2

## 2024-08-13 DIAGNOSIS — Z12.5 PROSTATE CANCER SCREENING: ICD-10-CM

## 2024-08-13 DIAGNOSIS — E29.1 HYPOGONADISM IN MALE: Primary | ICD-10-CM

## 2024-08-13 PROCEDURE — 99213 OFFICE O/P EST LOW 20 MIN: CPT | Performed by: UROLOGY

## 2024-08-13 PROCEDURE — 1160F RVW MEDS BY RX/DR IN RCRD: CPT | Performed by: UROLOGY

## 2024-08-13 PROCEDURE — 1159F MED LIST DOCD IN RCRD: CPT | Performed by: UROLOGY

## 2024-08-14 ENCOUNTER — TREATMENT (OUTPATIENT)
Dept: PHYSICAL THERAPY | Facility: CLINIC | Age: 64
End: 2024-08-14
Payer: OTHER GOVERNMENT

## 2024-08-14 DIAGNOSIS — G89.29 CHRONIC LEFT SHOULDER PAIN: Primary | ICD-10-CM

## 2024-08-14 DIAGNOSIS — M62.81 MUSCLE WEAKNESS OF LEFT UPPER EXTREMITY: ICD-10-CM

## 2024-08-14 DIAGNOSIS — M25.512 CHRONIC LEFT SHOULDER PAIN: Primary | ICD-10-CM

## 2024-08-14 DIAGNOSIS — M25.612 DECREASED ROM OF LEFT SHOULDER: ICD-10-CM

## 2024-08-14 NOTE — PROGRESS NOTES
Physical Therapy Daily Treatment Note  [facility]: [address] [place] [address]      Patient: Osman Holman Jr.   : 1960  Diagnosis/ICD-10 Code:  Chronic left shoulder pain [M25.512, G89.29]  Referring practitioner: Gael Novak MD  Date of Initial Visit: Type: THERAPY  Noted: 2024  Today's Date: 2024  Patient seen for 3 sessions           Subjective   The patient reported they are feeling about the same today. Pt reports their pain level is a 2/10, unless they move in a way to irritate their L shoulder.     Objective   See Exercise, Manual, and Modality Logs for complete treatment.     Assessment/Plan  Pt tolerates therapy well today. Pt has perception of greater ease when moving L shoulder into flexion, though continued discomfort in ABD after therapy session. Patient will continue to benefit from skilled physical therapy to further address their deficits in strength and ROM in order to improve upon their functional mobility and to return to ADLs and reaching tasks w/ greater ease and less discomfort.          Timed:  Manual Therapy:    15     mins  24483;  Therapeutic Exercise:    15     mins  87505;     Neuromuscular Otoniel:   0    mins  64576;    Therapeutic Activity:     0     mins  30864;     Gait Trainin     mins  74728;     Aquatics                         0      mins  26907    Un-timed:  Mechanical Traction      0     mins  63651  Electrical Stimulation:    0     mins  33734 ( );      Timed Treatment:   30   mins   Total Treatment:     30   mins    Gabino Trujillo PT, DPT    Electronically signed 2024    [place] License: PT - [id number]

## 2024-08-26 ENCOUNTER — TREATMENT (OUTPATIENT)
Dept: PHYSICAL THERAPY | Facility: CLINIC | Age: 64
End: 2024-08-26
Payer: OTHER GOVERNMENT

## 2024-08-26 DIAGNOSIS — G89.29 CHRONIC LEFT SHOULDER PAIN: Primary | ICD-10-CM

## 2024-08-26 DIAGNOSIS — M62.81 MUSCLE WEAKNESS OF LEFT UPPER EXTREMITY: ICD-10-CM

## 2024-08-26 DIAGNOSIS — M25.612 DECREASED ROM OF LEFT SHOULDER: ICD-10-CM

## 2024-08-26 DIAGNOSIS — M25.512 CHRONIC LEFT SHOULDER PAIN: Primary | ICD-10-CM

## 2024-08-26 NOTE — PROGRESS NOTES
Physical Therapy Daily Treatment Note  [place] [facility]: [address] [facility] [place] [address]      Patient: Osman Holman Jr.   : 1960  Diagnosis/ICD-10 Code:  Chronic left shoulder pain [M25.512, G89.29]  Referring practitioner: Gael Novak MD  Date of Initial Visit: Type: THERAPY  Noted: 2024  Today's Date: 2024  Patient seen for 4 sessions           Subjective   The patient reported they started to get some soreness when they were on vacation in their L shoulder. Pt feels as though their shot is starting to wear off. Pt can tell the pain is starting to increase some, as when they lay down to sleep they have more of an ache. Pt reports their current level of pain as a 3/10.     Objective   See Exercise, Manual, and Modality Logs for complete treatment.     Assessment/Plan  Pt does well in therapy today. Continued to emphasize fatigue in their L shoulder, not pain. Patient will continue to benefit from skilled physical therapy to further address their deficits in strength and ROM in order to improve upon their functional mobility and to return to reaching tasks w/o ADLs.          Timed:  Manual Therapy:    0     mins  40698;  Therapeutic Exercise:    15     mins  83433;     Neuromuscular Otoniel:   0    mins  45323;    Therapeutic Activity:     15     mins  34552;     Gait Trainin     mins  04812;     Aquatics                         0      mins  29459    Un-timed:  Mechanical Traction      0     mins  86197  Electrical Stimulation:    0     mins  96290 ( );      Timed Treatment:   30   mins   Total Treatment:     30   mins    Gabino Trujillo PT, DPT    Electronically signed 2024    [place] License: PT - [id number]

## 2024-08-27 NOTE — TELEPHONE ENCOUNTER
Caller: VIVI GRUBER        Best call back number: [phone]    What is the best time to reach you: ANYTIME    Who are you requesting to speak with (clinical staff, provider,  specific staff member): DR CHANEY NURSE      What was the call regarding: PLEASE CALL IN REF A LETTER FROM [facility] DENYING  MEDS DUE TO HEMOCRITS LEVELS. PROBABLY NEED TO RESUBMIT.  TCYP 200MG TESTOTERONE.  LATEST BLOODWORK SHOWED LOWER HEMOCRIT NUMBERS.    Do you require a callback: YES           no lesions,  no deformities, no significant scars are present,  chest wall non-tender,  breathing is unlabored without accessory muscle use,  clear to auscultation bilaterally

## 2024-08-28 ENCOUNTER — TREATMENT (OUTPATIENT)
Dept: PHYSICAL THERAPY | Facility: CLINIC | Age: 64
End: 2024-08-28
Payer: OTHER GOVERNMENT

## 2024-08-28 DIAGNOSIS — M62.81 MUSCLE WEAKNESS OF LEFT UPPER EXTREMITY: ICD-10-CM

## 2024-08-28 DIAGNOSIS — G89.29 CHRONIC LEFT SHOULDER PAIN: Primary | ICD-10-CM

## 2024-08-28 DIAGNOSIS — M25.612 DECREASED ROM OF LEFT SHOULDER: ICD-10-CM

## 2024-08-28 DIAGNOSIS — M25.512 CHRONIC LEFT SHOULDER PAIN: Primary | ICD-10-CM

## 2024-08-28 NOTE — PROGRESS NOTES
"   Physical Therapy Daily Treatment Note  [place] [facility] [address] [facility] [place] [place] [address]      Patient: Osman Holman Jr.   : 1960  Diagnosis/ICD-10 Code:  Chronic left shoulder pain [M25.512, G89.29]  Referring practitioner: Gael Novak MD  Date of Initial Visit: Type: THERAPY  Noted: 2024  Today's Date: 2024  Patient seen for 5 sessions           Subjective   The patient reported their L shoulder is not doing \"too bad today\". Pt reports they were able to trim some bushes up and use their L UE to toss some of the smaller ones w/o increased pain. Pt reports it was not a forced throw, just a gentle toss.     Objective   See Exercise, Manual, and Modality Logs for complete treatment.     Assessment/Plan  Pt does well in therapy today. Pt does have some discomfort when reaching overhead w/ the finger ladder today. Continued to encourage HEP. Patient will continue to benefit from skilled physical therapy to further address their deficits in strength and ROM in order to improve upon their functional mobility and to have decreased pain w/ daily tasks.          Timed:  Manual Therapy:    0     mins  35761;  Therapeutic Exercise:    30     mins  58566;     Neuromuscular Otoniel:   0    mins  34965;    Therapeutic Activity:     8     mins  52027;     Gait Trainin     mins  94446;     Aquatics                         0      mins  21161    Un-timed:  Mechanical Traction      0     mins  12016  Electrical Stimulation:    0     mins  95688 ( );      Timed Treatment:   38   mins   Total Treatment:     38   mins    Gabino Turjillo PT, DPT    Electronically signed 2024    [place] License: PT - [id number]                  "

## 2024-08-29 ENCOUNTER — OFFICE VISIT (OUTPATIENT)
Dept: CARDIOLOGY | Facility: CLINIC | Age: 64
End: 2024-08-29
Payer: OTHER GOVERNMENT

## 2024-08-29 VITALS
BODY MASS INDEX: 31.24 KG/M2 | SYSTOLIC BLOOD PRESSURE: 147 MMHG | DIASTOLIC BLOOD PRESSURE: 67 MMHG | HEIGHT: 66 IN | WEIGHT: 194.4 LBS | HEART RATE: 75 BPM

## 2024-08-29 DIAGNOSIS — I51.7 LVH (LEFT VENTRICULAR HYPERTROPHY): ICD-10-CM

## 2024-08-29 DIAGNOSIS — I35.0 AORTIC STENOSIS, SEVERE: Primary | ICD-10-CM

## 2024-08-29 DIAGNOSIS — I10 ESSENTIAL HYPERTENSION: ICD-10-CM

## 2024-08-29 DIAGNOSIS — E78.5 DYSLIPIDEMIA: ICD-10-CM

## 2024-08-29 RX ORDER — GLIPIZIDE 5 MG/1
5 TABLET ORAL DAILY
COMMUNITY

## 2024-08-29 NOTE — ASSESSMENT & PLAN NOTE
Patient with well-controlled blood pressure at home continue with monitoring on outpatient basis.  Patient is also to continue with amlodipine 5 mg once a day and lisinopril HCTZ 10/12.5 daily does

## 2024-08-29 NOTE — PROGRESS NOTES
Chief Complaint  Follow-up (6 Month, Patient has test results and labs prior to Heart Surgery in May 2024), Hypertension, and Hyperlipemia    Subjective    Patient is status post TAVR back in May has been doing well symptomatically since then denies any chest pain no change in his breathing ability.  Past Medical History:   Diagnosis Date    Allergic rhinitis     Aortic stenosis     ASYPTOMATIC- SEE'S DR. PACK, DENIED CP/SOB    Aortic valve replaced 05/09/2024    TAVR    Arthritis     Carpal tunnel syndrome on both sides CURRENTLY    Dermatitis     Diabetes     TYPE II, BG RUNS UNDER 200 IN AM    Diabetes mellitus type 2 with complications, uncontrolled 01/30/2019    ED (erectile dysfunction)     Essential hypertension     GERD (gastroesophageal reflux disease)     Heart murmur     ASYMPTOMATIC- SEE'S DR PACK, DENIED CP/SOB    Hyperlipemia     Hyperlipidemia     Hypertension     Hypogonadism in male 01/30/2019    Nose irritation 02/27/2020    Nostril sore 02/27/2020    Thyroid disorder     Vitamin D deficiency          Current Outpatient Medications:     amLODIPine (NORVASC) 5 MG tablet, Take 1 tablet by mouth Every Night., Disp: , Rfl:     aspirin 81 MG chewable tablet, Chew 1 tablet Daily. PT REPORTED TO STOP 1 WEEK PRIOR TO SURGERY, LAST DOSE 10/04/21per Dr. Copeland's instruction, Disp: , Rfl:     atorvastatin (LIPITOR) 40 MG tablet, Take 1 tablet by mouth Every Night., Disp: , Rfl:     cetirizine (zyrTEC) 10 MG tablet, Take 1 tablet by mouth Every Morning., Disp: , Rfl:     cholecalciferol (VITAMIN D3) 25 MCG (1000 UT) tablet, Take 1 tablet by mouth Daily., Disp: , Rfl:     Coenzyme Q10 100 MG tablet, Take 1 tablet by mouth Daily., Disp: , Rfl:     cyclobenzaprine (FLEXERIL) 10 MG tablet, Take 1 tablet by mouth 3 (Three) Times a Day As Needed for Muscle Spasms., Disp: 90 tablet, Rfl: 1    Diclofenac Sodium (VOLTAREN) 1 % gel gel, APPLY 4 GRAMS TO AFFECTED AREA FOUR TIMES A DAY AS NEEDED, Disp: , Rfl:      empagliflozin (JARDIANCE) 25 MG tablet tablet, Take 1 tablet by mouth Every Morning. Taking half tablet daily, Disp: , Rfl:     ezetimibe (ZETIA) 10 MG tablet, Take 1 tablet by mouth Daily., Disp: , Rfl:     FLUoxetine (PROzac) 20 MG capsule, Take 1 capsule by mouth Daily., Disp: , Rfl:     fluticasone (FLONASE) 50 MCG/ACT nasal spray, 2 sprays into the nostril(s) as directed by provider Daily As Needed., Disp: , Rfl:     Fluticasone-Umeclidin-Vilant (Trelegy Ellipta) 200-62.5-25 MCG/ACT aerosol powder , Inhale 1 puff Daily., Disp: 3 each, Rfl: 3    glipizide (GLUCOTROL) 5 MG tablet, Take 1 tablet by mouth Daily. Patient reports taking before evening meal, Disp: , Rfl:     hydrocortisone 2.5 % cream, , Disp: , Rfl:     Ketoconazole 1 % shampoo, Apply  topically to the appropriate area as directed., Disp: , Rfl:     levothyroxine (SYNTHROID, LEVOTHROID) 50 MCG tablet, Take 1 tablet by mouth., Disp: , Rfl:     lisinopril-hydrochlorothiazide (PRINZIDE,ZESTORETIC) 10-12.5 MG per tablet, Take 1 tablet by mouth Every Morning. inst per anesthesia protocol, Disp: , Rfl:     meloxicam (MOBIC) 15 MG tablet, Take 1 tablet by mouth Daily As Needed. inst per anesthesia protocol, Disp: , Rfl:     metFORMIN ER (GLUCOPHAGE-XR) 500 MG 24 hr tablet, Take 2 tablets by mouth Daily., Disp: , Rfl:     omeprazole (priLOSEC) 20 MG capsule, Take 1 capsule by mouth Daily As Needed., Disp: , Rfl:     Semaglutide, 1 MG/DOSE, (Ozempic, 1 MG/DOSE,) 2 MG/1.5ML solution pen-injector, Inject 1 mg under the skin into the appropriate area as directed., Disp: , Rfl:     sildenafil (VIAGRA) 25 MG tablet, Take 1 tablet by mouth., Disp: , Rfl:     sodium chloride 0.65 % nasal spray, 4 (Four) Times a Day., Disp: , Rfl:     Testosterone Cypionate (DEPOTESTOTERONE CYPIONATE) 200 MG/ML injection, Inject 0.88 mL into the appropriate muscle as directed by prescriber Every 14 (Fourteen) Days., Disp: 6 mL, Rfl: 1    traMADol (ULTRAM) 50 MG tablet, Take 1 tablet  "by mouth Every 12 (Twelve) Hours As Needed for Moderate Pain., Disp: 14 tablet, Rfl: 0    diclofenac (VOLTAREN) 75 MG EC tablet, Take 1 tablet by mouth 2 (Two) Times a Day. (Patient not taking: Reported on 2024), Disp: 60 tablet, Rfl: 0    Medications Discontinued During This Encounter   Medication Reason    glipizide (GLUCOTROL XL) 5 MG ER tablet *Therapy completed    azithromycin (Zithromax Z-Robert) 250 MG tablet *Therapy completed     Allergies   Allergen Reactions    Penicillins Itching     PT REPORTED CAN TAKE KEFLEX NO REACTIONS         Social History     Tobacco Use    Smoking status: Former     Current packs/day: 0.00     Average packs/day: 0.5 packs/day for 9.0 years (4.5 ttl pk-yrs)     Types: Cigarettes     Start date: 1986     Quit date: 1995     Years since quittin.6     Passive exposure: Past    Smokeless tobacco: Never    Tobacco comments:     0.5 ppd, smoked 6-10 years   Vaping Use    Vaping status: Never Used   Substance Use Topics    Alcohol use: Yes     Alcohol/week: 2.0 standard drinks of alcohol     Types: 2 Cans of beer per week     Comment: with meal    Drug use: Never       Family History   Problem Relation Age of Onset    Hypertension Mother     Arthritis Mother     Stroke Father     Diabetes Father     Hypertension Father     Hypertension Sister     Arthritis Sister     Hypertension Other     Diabetes Other     Malig Hyperthermia Neg Hx         Objective     /67 (BP Location: Left arm, Patient Position: Sitting, Cuff Size: Large Adult)   Pulse 75   Ht 167.6 cm (66\")   Wt 88.2 kg (194 lb 6.4 oz)   BMI 31.38 kg/m²       Physical Exam    General Appearance:   no acute distress  Alert and oriented x3  HENT:   lips not cyanotic  Atraumatic  Neck:  No jvd   supple  Respiratory:  no respiratory distress  normal breath sounds  no rales  Cardiovascular:  Regular rate and rhythm  no S3, no S4   no murmur  no rub  Extremities  No cyanosis  lower extremity edema: none    Skin: " "  warm, dry  No rashes      Result Review :     proBNP   Date Value Ref Range Status   05/09/2024 <50 0 - 899 pg/mL Final     CMP          3/27/2024    08:00 5/9/2024    11:22 7/18/2024    07:50   CMP   Glucose   128    BUN   19    Creatinine   0.85    EGFR  Am 83     83        EGFR   95.8    Sodium   138    Potassium   4.7    Chloride   104    Calcium   8.8    Total Protein   7.1    Albumin   4.4    Globulin   2.7    Total Bilirubin   0.7    Alkaline Phosphatase   50    AST (SGOT)   19    ALT (SGPT)   29    Albumin/Globulin Ratio   1.6    BUN/Creatinine Ratio   22.4    Anion Gap   9.8       Details          This result is from an external source.             CBC w/diff          5/9/2024    11:27 5/9/2024    18:17 5/10/2024    03:36 7/18/2024    07:50   CBC w/Diff   WBC 7.49     11.63     8.79     11.81    RBC 5.84     5.26     5.31     5.73    Hemoglobin 15.3     13.7     14.0     14.6    Hematocrit 49.1     43.7     44.3     46.6    MCV 84     83     83     81.3    MCH 26.2     26.0     26.4     25.5    MCHC 31.2     31.4     31.6     31.3    RDW 15.0     15.0     15.1     16.1    Platelets 293     251     235     210    Neutrophil Rel %    71.2    Immature Granulocyte Rel %    0.5    Lymphocyte Rel %    16.6    Monocyte Rel %    9.9    Eosinophil Rel %    1.2    Basophil Rel %    0.6       Details          This result is from an external source.              Lab Results   Component Value Date    TSH 3.690 07/18/2024      Lab Results   Component Value Date    FREET4 1.42 07/18/2024      No results found for: \"DDIMERQUANT\"  Magnesium   Date Value Ref Range Status   05/10/2024 2.5 (H) 1.9 - 2.4 mg/dL Final      No results found for: \"DIGOXIN\"   No results found for: \"TROPONINT\"        Lipid Panel          7/18/2024    07:50   Lipid Panel   Total Cholesterol 107    Triglycerides 60    HDL Cholesterol 55    VLDL Cholesterol 13    LDL Cholesterol  39    LDL/HDL Ratio 0.73      No results found for: " "\"POCTROP\"    Results for orders placed during the hospital encounter of 11/14/23    Adult Transesophageal Echo (IRSI) W/ Cont if Necessary Per Protocol    Interpretation Summary    Left ventricular wall thickness is consistent with moderate concentric hypertrophy.    The left atrial cavity is mildly dilated.    Saline test results are negative.    Severe aortic valve stenosis is present.                 Diagnoses and all orders for this visit:    1. Aortic stenosis, severe (Primary)  Assessment & Plan:  Doing well symptomatically continue chronic Aspir 81 daily will repeat echocardiogram in 1 year for serial monitoring    Orders:  -     Adult Transthoracic Echo Complete W/ Cont if Necessary Per Protocol; Future    2. Essential hypertension  Assessment & Plan:  Patient with well-controlled blood pressure at home continue with monitoring on outpatient basis.  Patient is also to continue with amlodipine 5 mg once a day and lisinopril HCTZ 10/12.5 daily does      3. Dyslipidemia    4. LVH (left ventricular hypertrophy)            Follow Up     Return in about 1 year (around 8/29/2025) for Follow with Orly Perez.          Patient was given instructions and counseling regarding his condition or for health maintenance advice. Please see specific information pulled into the AVS if appropriate.       "

## 2024-08-29 NOTE — ASSESSMENT & PLAN NOTE
Doing well symptomatically continue chronic Aspir 81 daily will repeat echocardiogram in 1 year for serial monitoring

## 2024-08-30 ENCOUNTER — TELEPHONE (OUTPATIENT)
Dept: CARDIOLOGY | Facility: CLINIC | Age: 64
End: 2024-08-30
Payer: OTHER GOVERNMENT

## 2024-08-30 DIAGNOSIS — E87.5 HYPERKALEMIA: Primary | ICD-10-CM

## 2024-08-30 NOTE — TELEPHONE ENCOUNTER
----- Message from Orly Perez sent at 8/30/2024  3:24 PM EDT -----  Potassium is elevated, repeat BMP in 2 weeks to recheck potassium level

## 2024-09-03 ENCOUNTER — TREATMENT (OUTPATIENT)
Dept: PHYSICAL THERAPY | Facility: CLINIC | Age: 64
End: 2024-09-03
Payer: OTHER GOVERNMENT

## 2024-09-03 DIAGNOSIS — M25.612 DECREASED ROM OF LEFT SHOULDER: ICD-10-CM

## 2024-09-03 DIAGNOSIS — G89.29 CHRONIC LEFT SHOULDER PAIN: Primary | ICD-10-CM

## 2024-09-03 DIAGNOSIS — M62.81 MUSCLE WEAKNESS OF LEFT UPPER EXTREMITY: ICD-10-CM

## 2024-09-03 DIAGNOSIS — M25.512 CHRONIC LEFT SHOULDER PAIN: Primary | ICD-10-CM

## 2024-09-03 NOTE — PROGRESS NOTES
Physical Therapy Daily Treatment Note  [place] [facility] [address] [facility] [place] [address]      Patient: Osman Holman Jr.   : 1960  Diagnosis/ICD-10 Code:  Chronic left shoulder pain [M25.512, G89.29]  Referring practitioner: Gael Novak MD  Date of Initial Visit: Type: THERAPY  Noted: 2024  Today's Date: 9/3/2024  Patient seen for 6 sessions           Subjective   The patient reported their L shoulder has been fair today. Pt rates their pain as a 2-3/10 today. Pt does continue to have increased pain w/ laying down. Pt is still having issues w/ holding their arm out to the side as well as resting their L shoulder on the window sill w/ their truck.    Objective   See Exercise, Manual, and Modality Logs for complete treatment.     Assessment/Plan  Continued to progress pt w/ their strengthening. Patient will continue to benefit from skilled physical therapy to further address their deficits in strength and ROM in order to improve upon their functional mobility and to return to daily tasks w/ decreased pain.          Timed:  Manual Therapy:    0     mins  15817;  Therapeutic Exercise:    20     mins  27337;     Neuromuscular Otoniel:   0    mins  73640;    Therapeutic Activity:     10     mins  93706;     Gait Trainin     mins  57012;     Aquatics                         0      mins  96829    Un-timed:  Mechanical Traction      0     mins  63733  Electrical Stimulation:    0     mins  87631 ( );      Timed Treatment:   30   mins   Total Treatment:     30   mins    Gabino Trujillo PT, DPT    Electronically signed 9/3/2024    [place] License: PT - [id number]

## 2024-09-05 ENCOUNTER — TREATMENT (OUTPATIENT)
Dept: PHYSICAL THERAPY | Facility: CLINIC | Age: 64
End: 2024-09-05
Payer: OTHER GOVERNMENT

## 2024-09-05 DIAGNOSIS — G89.29 CHRONIC LEFT SHOULDER PAIN: Primary | ICD-10-CM

## 2024-09-05 DIAGNOSIS — M62.81 MUSCLE WEAKNESS OF LEFT UPPER EXTREMITY: ICD-10-CM

## 2024-09-05 DIAGNOSIS — M25.512 CHRONIC LEFT SHOULDER PAIN: Primary | ICD-10-CM

## 2024-09-05 DIAGNOSIS — M25.612 DECREASED ROM OF LEFT SHOULDER: ICD-10-CM

## 2024-09-05 NOTE — PROGRESS NOTES
Progress Note   [facility]: [address] [place] [address]      Patient: Osman Holman Jr.   : 1960  Diagnosis/ICD-10 Code:  Chronic left shoulder pain [M25.512, G89.29]  Referring practitioner: Gael Novak MD  Date of Initial Visit: Type: THERAPY  Noted: 2024  Today's Date: 2024  Patient seen for 7 sessions      Subjective:   Subjective Questionnaire: QuickDASH: 28  Clinical Progress: improved  Home Program Compliance: Yes  Treatment has included: ADL retraining, soft tissue mobilization, strengthening, stretching, therapeutic activities, manual therapy, joint mobilization, home exercise program/patient education, functional ROM exercises, flexibility, body mechanics training, postural training, and neuromuscular re-education    Subjective   Pt reports their L shoulder pain is a 1/10. Pt reports they did reach back to pull some covers over. Their pain quickly went up to a 8-9/10, but then settled back down. Depending on how much they use their L UE, their pain can linger at slightly higher levels (2-3/10). Pt has seen improvement in their mobility and daily tasks around their house.       Objective   Active Range of Motion   Left Shoulder   Flexion: 145 degrees with pain  Abduction: 126 degrees with pain  External rotation BTH: T2 with pain  Internal rotation BTB: L2 with pain     Right Shoulder   Flexion: 160 degrees   Abduction: 160 degrees   External rotation BTH: T3   Internal rotation BTB: L1      Passive Range of Motion   Left Shoulder   Flexion: 145 degrees with pain  Abduction: 125 degrees with pain     Additional Passive Range of Motion Details  Endfeel is soft to firm      Strength/Myotome Testing      Left Shoulder      Planes of Motion   Flexion: 4+   Abduction: 4- (most pain)  External rotation at 0°: 4+   Internal rotation at 0°: 5 (some pain)      Isolated Muscles   Biceps: 5      Right Shoulder      Planes of Motion   Flexion: 5   Abduction: 5   External rotation  at 0°: 5   Internal rotation at 0°: 5      Isolated Muscles   Biceps: 5       See Exercise, Manual, and Modality Logs for complete treatment.     Assessment/Plan  Impairments: abnormal coordination, abnormal muscle firing, abnormal or restricted ROM, activity intolerance, impaired physical strength, lacks appropriate home exercise program and pain with function   Functional limitations: carrying objects, lifting, sleeping, pulling, pushing, reaching behind back, reaching overhead and unable to perform repetitive tasks     Pt reported to physical therapy w/ complaints of L shoulder pain. Pt demonstrates improvement in their strength as well as their perceived deficits described by Antwan. Pt has maintained their ROM, other than shoulder ABD. Pt has had overall decreased pain since starting therapy. Pt does continued to struggle w/ tasks that involve reaching behind them. Continued to encourage pt to perform their HEP. Discussed w/ pt about injections for further pain relief as well. Patient will continue to benefit from skilled physical therapy to further address their deficits in strength and ROM in order to improve upon their functional mobility and to return to reaching tasks and sleep w/ decreased pain.         Goals  Plan Goals: SHOULDER  PROBLEMS:      1. The patient has limited ROM of the L shoulder.                   LTG 1: 12 weeks:  The patient will demonstrate 160 degrees of L shoulder flexion, 160 degrees of shoulder abduction, to T4 with overhead external rotation, and to T12 with behind their back internal rotation to allow the patient to perform all dressing tasks without restrictions.                          STATUS:  not met              STG 1a: 6 weeks:  The patient will demonstrate 155 degrees of L shoulder flexion, 140 degrees of shoulder abduction, to T3 with overhead external rotation, and to L3 with behind their back internal rotation to allow the patient to perform all dressing tasks  without restrictions.                          STATUS:  not met              TREATMENT: Manual therapy, therapeutic exercise, home exercise instruction, and modalities as needed to include: electrical stimulation, moist heat, and ice.     2. The patient has limited strength of the L shoulder.              LTG 2: 12 weeks:  The patient will demonstrate 5 /5 strength for L shoulder flexion, abduction, external rotation, and internal rotation in order to demonstrate improved shoulder stability.                          STATUS:  not met              STG 2a: 6 weeks:  The patient will demonstrate 4+ /5 strength for L shoulder flexion, abduction, external rotation, and internal rotation.                          STATUS:  not met              STG2b:  6 weeks:  The patient will be independent with home exercises.                           STATUS:  met              TREATMENT: Manual therapy, therapeutic exercise, home exercise instruction, and modalities as needed to include: electrical stimulation, moist heat, and ice.                3. The patient complains of pain to the L shoulder.              LTG 3:12 weeks:  The patient will report a pain rating of 0 /10 or better in order to improve sleep quality and tolerance to performance of activities of daily living.                          STATUS:  not met              STG 3a: 6 weeks:  The patient will report a pain rating of 2 /10 or better.                           STATUS:  met              TREATMENT: Manual therapy, therapeutic exercise, home exercise instruction, and modalities as needed to include: electrical stimulation, moist heat, and ice.     4. Carrying, Moving, and Handling Objects Functional Limitation                               LTG 4: 12 weeks:  The patient will demonstrate 9 % limitation by achieving a score of 15 on the QuickDASH.                          STATUS:  not  met              STG 4a: 6 weeks:  The patient will demonstrate 32 % limitation by  achieving a score of 25 on the QuickDASH.                            STATUS:  not met              TREATMENT:  Manual therapy, therapeutic exercise, home exercise instruction, and modalities as needed to include: moist heat and electrical stimulation.            Progress toward previous goals: Partially Met      Recommendations: Continue as planned  Timeframe: 2 a week, for 2 months   Prognosis to achieve goals: fair    PT Signature: Gabino Trujillo PT, DPT    Electronically signed 2024    [place] License: PT - [id number]       Timed:  Manual Therapy:    0     mins  67573;  Therapeutic Exercise:    30     mins  55055;     Neuromuscular Otoniel:    0    mins  70495;    Therapeutic Activity:     0     mins  25615;     Gait Trainin     mins  50440;     Aquatics                         0      mins  12931    Un-timed:  Mechanical Traction      0     mins  00681  Dry Needling     0     mins self-pay  Electrical Stimulation:    0     mins  48824 ( );    Timed Treatment:   30   mins   Total Treatment:     30   mins

## 2024-09-09 ENCOUNTER — TELEPHONE (OUTPATIENT)
Dept: PHYSICAL THERAPY | Facility: CLINIC | Age: 64
End: 2024-09-09
Payer: OTHER GOVERNMENT

## 2024-09-09 NOTE — TELEPHONE ENCOUNTER
Caller: Osman Holman Jr.    Relationship: Self       What was the call regarding: INSURANCE HAS NOT APPROVED , WHEN WE FIND OUT IF YOU COULD CALL HIM, HE IS GONE NEXT WEEK

## 2024-09-10 ENCOUNTER — LAB (OUTPATIENT)
Dept: LAB | Facility: HOSPITAL | Age: 64
End: 2024-09-10
Payer: OTHER GOVERNMENT

## 2024-09-10 DIAGNOSIS — E87.5 HYPERKALEMIA: ICD-10-CM

## 2024-09-10 LAB
ANION GAP SERPL CALCULATED.3IONS-SCNC: 13 MMOL/L (ref 5–15)
BUN SERPL-MCNC: 16 MG/DL (ref 8–23)
BUN/CREAT SERPL: 18 (ref 7–25)
CALCIUM SPEC-SCNC: 9.2 MG/DL (ref 8.6–10.5)
CHLORIDE SERPL-SCNC: 101 MMOL/L (ref 98–107)
CO2 SERPL-SCNC: 24 MMOL/L (ref 22–29)
CREAT SERPL-MCNC: 0.89 MG/DL (ref 0.76–1.27)
EGFRCR SERPLBLD CKD-EPI 2021: 94.5 ML/MIN/1.73
GLUCOSE SERPL-MCNC: 137 MG/DL (ref 65–99)
POTASSIUM SERPL-SCNC: 4.4 MMOL/L (ref 3.5–5.2)
SODIUM SERPL-SCNC: 138 MMOL/L (ref 136–145)

## 2024-09-10 PROCEDURE — 80048 BASIC METABOLIC PNL TOTAL CA: CPT

## 2024-09-10 PROCEDURE — 36415 COLL VENOUS BLD VENIPUNCTURE: CPT

## 2024-09-24 ENCOUNTER — TREATMENT (OUTPATIENT)
Dept: PHYSICAL THERAPY | Facility: CLINIC | Age: 64
End: 2024-09-24
Payer: OTHER GOVERNMENT

## 2024-09-24 DIAGNOSIS — M62.81 MUSCLE WEAKNESS OF LEFT UPPER EXTREMITY: ICD-10-CM

## 2024-09-24 DIAGNOSIS — M25.612 DECREASED ROM OF LEFT SHOULDER: ICD-10-CM

## 2024-09-24 DIAGNOSIS — G89.29 CHRONIC LEFT SHOULDER PAIN: Primary | ICD-10-CM

## 2024-09-24 DIAGNOSIS — M25.512 CHRONIC LEFT SHOULDER PAIN: Primary | ICD-10-CM

## 2024-09-24 PROCEDURE — 97530 THERAPEUTIC ACTIVITIES: CPT

## 2024-09-24 PROCEDURE — 97112 NEUROMUSCULAR REEDUCATION: CPT

## 2024-09-26 ENCOUNTER — TREATMENT (OUTPATIENT)
Dept: PHYSICAL THERAPY | Facility: CLINIC | Age: 64
End: 2024-09-26
Payer: OTHER GOVERNMENT

## 2024-09-26 DIAGNOSIS — M25.612 DECREASED ROM OF LEFT SHOULDER: ICD-10-CM

## 2024-09-26 DIAGNOSIS — M25.512 CHRONIC LEFT SHOULDER PAIN: Primary | ICD-10-CM

## 2024-09-26 DIAGNOSIS — G89.29 CHRONIC LEFT SHOULDER PAIN: Primary | ICD-10-CM

## 2024-09-26 DIAGNOSIS — M62.81 MUSCLE WEAKNESS OF LEFT UPPER EXTREMITY: ICD-10-CM

## 2024-10-01 ENCOUNTER — TREATMENT (OUTPATIENT)
Dept: PHYSICAL THERAPY | Facility: CLINIC | Age: 64
End: 2024-10-01
Payer: OTHER GOVERNMENT

## 2024-10-01 DIAGNOSIS — M25.612 DECREASED ROM OF LEFT SHOULDER: ICD-10-CM

## 2024-10-01 DIAGNOSIS — M62.81 MUSCLE WEAKNESS OF LEFT UPPER EXTREMITY: ICD-10-CM

## 2024-10-01 DIAGNOSIS — G89.29 CHRONIC LEFT SHOULDER PAIN: Primary | ICD-10-CM

## 2024-10-01 DIAGNOSIS — M25.512 CHRONIC LEFT SHOULDER PAIN: Primary | ICD-10-CM

## 2024-10-01 PROCEDURE — 97112 NEUROMUSCULAR REEDUCATION: CPT

## 2024-10-01 PROCEDURE — 97530 THERAPEUTIC ACTIVITIES: CPT

## 2024-10-01 NOTE — PROGRESS NOTES
"Physical Therapy Daily Treatment Note  [facility] [address] [place] [address]    Patient: Osman Holman Jr.   : 1960  Referring practitioner: Gael Novak MD  Date of Initial Visit: Type: THERAPY  Noted: 2024  Today's Date: 10/1/2024  Patient seen for 10 sessions           Subjective  Osman Holman reports: he still experiences the same pain during certain L shoulder motions.  \"Seems like it hs been aggravated since last visit but maybe it wasn't anything we did, maybe it is time for another shot.\" Osman noted he is going good with his HEP.    Objective   See Exercise, Manual, and Modality Logs for complete treatment.       Assessment/Plan  Osman will undergo PN assessment next session to determine further need for therapist directed intervention.    Visit Diagnoses:    ICD-10-CM ICD-9-CM   1. Chronic left shoulder pain  M25.512 719.41    G89.29 338.29   2. Decreased ROM of left shoulder  M25.612 719.51   3. Muscle weakness of left upper extremity  M62.81 728.87       Progress per Plan of Care and Progress strengthening /stabilization /functional activity       Timed:  Manual Therapy:         mins  86778;  Therapeutic Exercise:         mins  86767;     Neuromuscular Otoniel:    12    mins  81440;    Therapeutic Activity:     18     mins  45454;     Gait Training:           mins  22529;     Ultrasound:          mins  91395;    Electrical Stimulation:         mins  81332 ( );  Aquatics  __   mins   77252    Untimed:  Electrical Stimulation:         mins  53598 ( );  Mechanical Traction:         mins  36935;     Timed Treatment:   30   mins   Total Treatment:     30   mins    Electronically Signed:  Angela Burgess PTA  Physical Therapist Assistant    [place] PTA license [id number]            "

## 2024-10-08 ENCOUNTER — TREATMENT (OUTPATIENT)
Dept: PHYSICAL THERAPY | Facility: CLINIC | Age: 64
End: 2024-10-08
Payer: OTHER GOVERNMENT

## 2024-10-08 DIAGNOSIS — M25.612 DECREASED ROM OF LEFT SHOULDER: ICD-10-CM

## 2024-10-08 DIAGNOSIS — M62.81 MUSCLE WEAKNESS OF LEFT UPPER EXTREMITY: ICD-10-CM

## 2024-10-08 DIAGNOSIS — G89.29 CHRONIC LEFT SHOULDER PAIN: Primary | ICD-10-CM

## 2024-10-08 DIAGNOSIS — M25.512 CHRONIC LEFT SHOULDER PAIN: Primary | ICD-10-CM

## 2024-10-08 NOTE — PROGRESS NOTES
Progress Note  [facility]: [address] [place] [address]      Patient: Osman Holman Jr.   : 1960  Diagnosis/ICD-10 Code:  Chronic left shoulder pain [M25.512, G89.29]  Referring practitioner: Gael Novak MD  Date of Initial Visit: Type: THERAPY  Noted: 2024  Today's Date: 10/8/2024  Patient seen for 11 sessions      Subjective:   Subjective Questionnaire: QuickDASH: 28  Clinical Progress: unchanged  Home Program Compliance: Yes  Treatment has included: balance/weight-bearing training, ADL retraining, soft tissue mobilization, strengthening, stretching, therapeutic activities, manual therapy, joint mobilization, home exercise program/patient education, gait training, functional ROM exercises, flexibility, body mechanics training, postural training, and neuromuscular re-education    Subjective   Pt reports their L shoulder has been about the same. Pt reports it still is dependent on how they are moving their L shoulder. Pt returns to ortho on 10/18/2024.       Objective   Active Range of Motion   Left Shoulder   Flexion: 145 degrees with pain  Abduction: 110 degrees with pain  External rotation BTH: T2 with pain  Internal rotation BTB: L2 with pain     Right Shoulder   Flexion: 160 degrees   Abduction: 160 degrees   External rotation BTH: T3   Internal rotation BTB: L1      Passive Range of Motion   Left Shoulder   Flexion: 145 degrees with pain  Abduction: 125 degrees with pain     Additional Passive Range of Motion Details  Endfeel is soft to firm      Strength/Myotome Testing      Left Shoulder      Planes of Motion   Flexion: 4+   Abduction: 4- (most pain)  External rotation at 0°: 4+   Internal rotation at 0°: 5      Isolated Muscles   Biceps: 5      Right Shoulder      Planes of Motion   Flexion: 5   Abduction: 5   External rotation at 0°: 5   Internal rotation at 0°: 5      Isolated Muscles   Biceps: 5     See Exercise, Manual, and Modality Logs for complete treatment.      Assessment/Plan  Impairments: abnormal coordination, abnormal muscle firing, abnormal or restricted ROM, activity intolerance, impaired physical strength, and pain with function   Functional limitations: carrying objects, lifting, sleeping, pulling, pushing, reaching behind back, reaching overhead and unable to perform repetitive tasks      Pt reported to physical therapy w/ complaints of L shoulder pain. Pt has reached a plateau in their progress w/n therapy. Pt also has had no change in their QuickDASH at this time. Pt continues to follow up w/ ortho and this is encouraged at this time, as they did have relief w/ the injections they were receiving. Plan to move towards a HEP at the next therapy session due to limited progress in therapy at this time, unless their is a change in presentation at the next therapy session.       Goals  Plan Goals: SHOULDER  PROBLEMS:      1. The patient has limited ROM of the L shoulder.                   LTG 1: 12 weeks:  The patient will demonstrate 160 degrees of L shoulder flexion, 160 degrees of shoulder abduction, to T4 with overhead external rotation, and to T12 with behind their back internal rotation to allow the patient to perform all dressing tasks without restrictions.                          STATUS:  not met              STG 1a: 6 weeks:  The patient will demonstrate 155 degrees of L shoulder flexion, 140 degrees of shoulder abduction, to T3 with overhead external rotation, and to L3 with behind their back internal rotation to allow the patient to perform all dressing tasks without restrictions.                          STATUS:  not met              TREATMENT: Manual therapy, therapeutic exercise, home exercise instruction, and modalities as needed to include: electrical stimulation, moist heat, and ice.     2. The patient has limited strength of the L shoulder.              LTG 2: 12 weeks:  The patient will demonstrate 5 /5 strength for L shoulder flexion,  abduction, external rotation, and internal rotation in order to demonstrate improved shoulder stability.                          STATUS:  not met              STG 2a: 6 weeks:  The patient will demonstrate 4+ /5 strength for L shoulder flexion, abduction, external rotation, and internal rotation.                          STATUS:  not met              STG2b:  6 weeks:  The patient will be independent with home exercises.                           STATUS:  met              TREATMENT: Manual therapy, therapeutic exercise, home exercise instruction, and modalities as needed to include: electrical stimulation, moist heat, and ice.                3. The patient complains of pain to the L shoulder.              LTG 3:12 weeks:  The patient will report a pain rating of 0 /10 or better in order to improve sleep quality and tolerance to performance of activities of daily living.                          STATUS:  not met              STG 3a: 6 weeks:  The patient will report a pain rating of 2 /10 or better.                           STATUS:  met              TREATMENT: Manual therapy, therapeutic exercise, home exercise instruction, and modalities as needed to include: electrical stimulation, moist heat, and ice.     4. Carrying, Moving, and Handling Objects Functional Limitation                               LTG 4: 12 weeks:  The patient will demonstrate 9 % limitation by achieving a score of 15 on the QuickDASH.                          STATUS:  not  met              STG 4a: 6 weeks:  The patient will demonstrate 32 % limitation by achieving a score of 25 on the QuickDASH.                            STATUS:  not met              TREATMENT:  Manual therapy, therapeutic exercise, home exercise instruction, and modalities as needed to include: moist heat and electrical stimulation.        Progress toward previous goals: Partially Met      Recommendations: Continue as planned  Timeframe: 1 a week, for 1 months   Prognosis to  achieve goals: fair    PT Signature: Gabino Trujillo PT, DPT    Electronically signed 10/8/2024    [place] License: PT - [id number]       Timed:  Manual Therapy:    0     mins  94665;  Therapeutic Exercise:    30     mins  82362;     Neuromuscular Otoniel:    0    mins  78557;    Therapeutic Activity:     0     mins  39296;     Gait Trainin     mins  46428;     Aquatics                         0      mins  28524    Un-timed:  Mechanical Traction      0     mins  06472  Dry Needling     0     mins self-pay  Electrical Stimulation:    0     mins  56079 ( );  Re-Eval:         0     mins  93865    Timed Treatment:   30   mins   Total Treatment:     30   mins

## 2024-10-10 ENCOUNTER — TREATMENT (OUTPATIENT)
Dept: PHYSICAL THERAPY | Facility: CLINIC | Age: 64
End: 2024-10-10
Payer: OTHER GOVERNMENT

## 2024-10-10 DIAGNOSIS — G89.29 CHRONIC LEFT SHOULDER PAIN: Primary | ICD-10-CM

## 2024-10-10 DIAGNOSIS — M25.512 CHRONIC LEFT SHOULDER PAIN: Primary | ICD-10-CM

## 2024-10-10 DIAGNOSIS — M25.612 DECREASED ROM OF LEFT SHOULDER: ICD-10-CM

## 2024-10-10 DIAGNOSIS — M62.81 MUSCLE WEAKNESS OF LEFT UPPER EXTREMITY: ICD-10-CM

## 2024-10-10 NOTE — PROGRESS NOTES
Outpatient Physical Therapy  [address] [place] [address]                            Physical Therapy Daily Treatment Note    Patient: Osman Holman Jr.   : 1960  Diagnosis/ICD-10 Code:  Chronic left shoulder pain [M25.512, G89.29]  Referring practitioner: Gael Novak MD  Date of Initial Visit: Type: THERAPY  Noted: 2024  Today's Date: 10/10/2024  Patient seen for 12 sessions           Subjective   Osman Holman reports: overall shoulder has been better, today is his last scheduled PT appt and he would like an updated shoulder program of what he can/should be doing at home. States that he goes back to Ortho next week and is going to ask for another shot in his shoulder.     Objective   Updated HEP for strengthening and stretching of both shoulder and neck.    See Exercise, Manual, and Modality Logs for complete treatment.     Assessment/Plan  Osman progressing as evident by decreased overall L shoulder pain. Pt tolerated exercises well,  updated HEP . Pt plans to discharge and continue HEP for strengthening and stretching.       Progress per Plan of Care         Timed:  Manual Therapy:         mins  60628;  Therapeutic Exercise:    30     mins  62976;     Neuromuscular Otoniel:        mins  36513;    Therapeutic Activity:          mins  61200;     Gait Training:           mins  51788;      Untimed:  Electrical Stimulation:         mins  49646 ( );  Mechanical Traction:         mins  56650;     Timed Treatment:   30   mins   Total Treatment:     30   mins      Electronically signed:     Nena Tyler PTA  Physical Therapist Assistant  [facility] License #: [id number]                Outpatient Physical Therapy  [address] [place] [address]      Discharge Summary  Discharge Summary from Physical Therapy Report      Dates  PT visit: 24-10/10/24  Number of Visits: 12       Goals  Plan Goals: SHOULDER  PROBLEMS:      1. The patient has limited ROM of the  L shoulder.                   LTG 1: 12 weeks:  The patient will demonstrate 160 degrees of L shoulder flexion, 160 degrees of shoulder abduction, to T4 with overhead external rotation, and to T12 with behind their back internal rotation to allow the patient to perform all dressing tasks without restrictions.                          STATUS:  not met              STG 1a: 6 weeks:  The patient will demonstrate 155 degrees of L shoulder flexion, 140 degrees of shoulder abduction, to T3 with overhead external rotation, and to L3 with behind their back internal rotation to allow the patient to perform all dressing tasks without restrictions.                          STATUS:  not met              TREATMENT: Manual therapy, therapeutic exercise, home exercise instruction, and modalities as needed to include: electrical stimulation, moist heat, and ice.     2. The patient has limited strength of the L shoulder.              LTG 2: 12 weeks:  The patient will demonstrate 5 /5 strength for L shoulder flexion, abduction, external rotation, and internal rotation in order to demonstrate improved shoulder stability.                          STATUS:  not met              STG 2a: 6 weeks:  The patient will demonstrate 4+ /5 strength for L shoulder flexion, abduction, external rotation, and internal rotation.                          STATUS:  not met              STG2b:  6 weeks:  The patient will be independent with home exercises.                           STATUS:  met              TREATMENT: Manual therapy, therapeutic exercise, home exercise instruction, and modalities as needed to include: electrical stimulation, moist heat, and ice.                3. The patient complains of pain to the L shoulder.              LTG 3:12 weeks:  The patient will report a pain rating of 0 /10 or better in order to improve sleep quality and tolerance to performance of activities of daily living.                          STATUS:  not met               STG 3a: 6 weeks:  The patient will report a pain rating of 2 /10 or better.                           STATUS:  met              TREATMENT: Manual therapy, therapeutic exercise, home exercise instruction, and modalities as needed to include: electrical stimulation, moist heat, and ice.     4. Carrying, Moving, and Handling Objects Functional Limitation                               LTG 4: 12 weeks:  The patient will demonstrate 9 % limitation by achieving a score of 15 on the QuickDASH.                          STATUS:  not  met              STG 4a: 6 weeks:  The patient will demonstrate 32 % limitation by achieving a score of 25 on the QuickDASH.                            STATUS:  not met              TREATMENT:  Manual therapy, therapeutic exercise, home exercise instruction, and modalities as needed to include: moist heat and electrical stimulation.         Discharge Plan: Continue with current home exercise program as instructed    Date of Discharge 10/10/2024      Electronically signed:   Nena Tyler PTA  Physical Therapist Assistant  [facility] License #: [id number]

## 2024-10-18 ENCOUNTER — OFFICE VISIT (OUTPATIENT)
Dept: ORTHOPEDIC SURGERY | Facility: CLINIC | Age: 64
End: 2024-10-18
Payer: MEDICARE

## 2024-10-18 VITALS
WEIGHT: 195.55 LBS | BODY MASS INDEX: 31.43 KG/M2 | HEIGHT: 66 IN | SYSTOLIC BLOOD PRESSURE: 146 MMHG | OXYGEN SATURATION: 95 % | DIASTOLIC BLOOD PRESSURE: 72 MMHG | HEART RATE: 95 BPM

## 2024-10-18 DIAGNOSIS — M19.012 OSTEOARTHRITIS OF LEFT SHOULDER, UNSPECIFIED OSTEOARTHRITIS TYPE: ICD-10-CM

## 2024-10-18 DIAGNOSIS — M25.512 LEFT SHOULDER PAIN, UNSPECIFIED CHRONICITY: Primary | ICD-10-CM

## 2024-10-18 DIAGNOSIS — M75.42 IMPINGEMENT SYNDROME OF LEFT SHOULDER: ICD-10-CM

## 2024-10-18 DIAGNOSIS — M77.8 SHOULDER TENDONITIS, LEFT: ICD-10-CM

## 2024-10-18 RX ORDER — LIDOCAINE HYDROCHLORIDE 10 MG/ML
5 INJECTION, SOLUTION INFILTRATION; PERINEURAL
Status: COMPLETED | OUTPATIENT
Start: 2024-10-18 | End: 2024-10-18

## 2024-10-18 RX ORDER — TRIAMCINOLONE ACETONIDE 40 MG/ML
40 INJECTION, SUSPENSION INTRA-ARTICULAR; INTRAMUSCULAR
Status: COMPLETED | OUTPATIENT
Start: 2024-10-18 | End: 2024-10-18

## 2024-10-18 RX ADMIN — LIDOCAINE HYDROCHLORIDE 5 ML: 10 INJECTION, SOLUTION INFILTRATION; PERINEURAL at 08:42

## 2024-10-18 RX ADMIN — TRIAMCINOLONE ACETONIDE 40 MG: 40 INJECTION, SUSPENSION INTRA-ARTICULAR; INTRAMUSCULAR at 08:42

## 2024-10-18 NOTE — PROGRESS NOTES
"Chief Complaint  Pain and Follow-up of the Left Shoulder    Subjective      Osman Holman Jr. presents to [facility] for follow up of his left shoulder.  Patient was last seen in office by Dr. Novak on 7/12/2024 and received a left shoulder steroid injection.  He was given a prescription for tramadol as well..  MRI of his left shoulder showed mild glenohumeral joint arthritis with labral tearing, moderate tendinosis of the supraspinatus tendon and moderate arthritis of the AC joint as well as tendinosis of the biceps tendon.  Was prescribed physical therapy at the time of his last office visit.    Today patient states, he has been doing okay.  He states that the second injection lasted quite a bit longer than the first.  He states that he did attend rehab and it is helping working out.  He is also participating in his home exercises and has reported full range of motion but certain movements aggravate the pain worse.  He denies any falls or injuries since his last office visit.  He would like to proceed with repeat left shoulder steroid injection at this time.      Allergies   Allergen Reactions    Penicillins Itching     PT REPORTED CAN TAKE KEFLEX NO REACTIONS     Penicillin G Rash       Objective     Vital Signs:   Vitals:    10/18/24 0841   BP: 146/72   Pulse: 95   SpO2: 95%   Weight: 88.7 kg (195 lb 8.8 oz)   Height: 167.6 cm (66\")     Body mass index is 31.56 kg/m².    I reviewed the patient's chief complaint, history of present illness, review of systems, past medical history, surgical history, family history, social history, medications, and allergy list.     REVIEW OF SYSTEMS    Constitutional: Denies fevers, chills, weight loss  Cardiovascular: Denies chest pain, shortness of breath  Skin: Denies rashes, acute skin changes  Neurologic: Denies headache, loss of consciousness  MSK: Left shoulder pain.     Ortho Exam  Shoulder   General: Alert. No acute distress.  Left upper " Extremity: 180 degrees active elevation. External rotation to 65 degrees.  Tenderness to palpation over AC joint line.  Internal rotation to mid lumbar.   Demonstrates intact active elbow ROM. Demonstrates intact active wrist ROM. Sensation intact. Palpable radial pulse. Neurovascularly intact.      Large Joint Arthrocentesis: L subacromial bursa  Date/Time: 10/18/2024 8:42 AM  Consent given by: patient  Site marked: site marked  Timeout: Immediately prior to procedure a time out was called to verify the correct patient, procedure, equipment, support staff and site/side marked as required   Supporting Documentation  Indications: pain   Procedure Details  Location: shoulder - L subacromial bursa  Preparation: Patient was prepped and draped in the usual sterile fashion  Needle gauge: 21 G.  Approach: posterior  Medications administered: 5 mL lidocaine 1 %; 40 mg triamcinolone acetonide 40 MG/ML  Patient tolerance: patient tolerated the procedure well with no immediate complications       This injection documentation was Scribed for OCTAVIO Dawkins by Latha Abdi.  10/18/24   08:43 EDT      Imaging Results (Most Recent)       None                Assessment and Plan   Diagnoses and all orders for this visit:    1. Left shoulder pain, unspecified chronicity (Primary)  -     Large Joint Arthrocentesis: L subacromial bursa    2. Shoulder tendonitis, left  -     Large Joint Arthrocentesis: L subacromial bursa    3. Osteoarthritis of left shoulder, unspecified osteoarthritis type  -     Large Joint Arthrocentesis: L subacromial bursa    4. Impingement syndrome of left shoulder  -     Large Joint Arthrocentesis: L subacromial bursa         Osman Holman Jr. presents today to [facility] for the follow up of their left shoulder. They have left shoulder pain and osteoarthritis that we have been treating conservatively with intermittent injections.     We discussed the risks, benefits and alternatives of  injections. Patient was informed of possible adverse effects including but not limited to bleeding, damage to nerve, tendon or artery, increased blood sugar and increased blood pressure. Discussed possibility of a reaction from the injection.  Discussed the possibility that the injection may not completely improve or remove the pain.  Discussed the risk of infection.  Discussed the possibility of worsening pain after the injection.  Informed consent obtained.  Time out was performed. .  Chlorhexidine was swabbed at injection site per typical technique. Needle injected into bursa, aspiration was performed and then left shoulder steroid slowly injected into joint space, fluid was free flowing. Needle was removed, band-aid placed to injection site.  Patient tolerated injection well with no complications.     Follow up as needed.        Tobacco Use: Medium Risk (10/18/2024)    Patient History     Smoking Tobacco Use: Former     Smokeless Tobacco Use: Never     Passive Exposure: Past     Patient reports they have a history of tobacco use; encouraged continued tobacco cessation for further health benefits.            Follow Up   No follow-ups on file.  There are no Patient Instructions on file for this visit.  Patient was given instructions and counseling regarding his condition or for health maintenance advice. Please see specific information pulled into the AVS if appropriate.       Dictated Utilizing Dragon Dictation. Please note that portions of this note were completed with a voice recognition program. Part of this note may be an electronic transcription/translation of spoken language to printed text using the Dragon Dictation System.

## 2024-12-12 ENCOUNTER — TELEPHONE (OUTPATIENT)
Dept: CARDIOLOGY | Facility: CLINIC | Age: 64
End: 2024-12-12
Payer: OTHER GOVERNMENT

## 2024-12-12 NOTE — TELEPHONE ENCOUNTER
The [facility] received a fax that requires your attention. The document has been indexed to the patient’s chart for your review.      Reason for sending: EXTERNAL MEDICAL RECORD NOTIFICATION     Documents Description: CARDIAC CLEARANCE REQ-[facility]-12.12.24    Name of Sender: [facility]     Date Indexed: 12.12.24

## 2025-01-23 ENCOUNTER — OFFICE VISIT (OUTPATIENT)
Dept: ORTHOPEDIC SURGERY | Facility: CLINIC | Age: 65
End: 2025-01-23
Payer: MEDICARE

## 2025-01-23 VITALS
DIASTOLIC BLOOD PRESSURE: 76 MMHG | HEART RATE: 72 BPM | HEIGHT: 66 IN | OXYGEN SATURATION: 96 % | WEIGHT: 195 LBS | SYSTOLIC BLOOD PRESSURE: 156 MMHG | BODY MASS INDEX: 31.34 KG/M2

## 2025-01-23 DIAGNOSIS — M75.42 IMPINGEMENT SYNDROME OF LEFT SHOULDER: ICD-10-CM

## 2025-01-23 DIAGNOSIS — M19.012 OSTEOARTHRITIS OF LEFT SHOULDER, UNSPECIFIED OSTEOARTHRITIS TYPE: ICD-10-CM

## 2025-01-23 DIAGNOSIS — M77.8 SHOULDER TENDONITIS, LEFT: ICD-10-CM

## 2025-01-23 DIAGNOSIS — M25.512 LEFT SHOULDER PAIN, UNSPECIFIED CHRONICITY: Primary | ICD-10-CM

## 2025-01-23 RX ORDER — TRAMADOL HYDROCHLORIDE 50 MG/1
50 TABLET ORAL EVERY 12 HOURS PRN
Qty: 14 TABLET | Refills: 0 | Status: SHIPPED | OUTPATIENT
Start: 2025-01-23

## 2025-01-23 NOTE — PROGRESS NOTES
"Chief Complaint  Follow-up of the Left Shoulder     Subjective      Osman Holman Jr. presents to [facility] for follow up of the left shoulder.  He has pain in the left shoulder.  He had an injection on 10/18/24 that gives relief until a couple of weeks ago.   MRI of his left shoulder showed mild glenohumeral joint arthritis with labral tearing, moderate tendinosis of the supraspinatus tendon and moderate arthritis of the AC joint as well as tendinosis of the biceps tendon. He has had physical therapy in the past for the left shoulder.  He notes conservative measures are working well at this time.     Allergies   Allergen Reactions    Penicillins Itching     PT REPORTED CAN TAKE KEFLEX NO REACTIONS     Penicillin G Rash        Social History     Socioeconomic History    Marital status:    Tobacco Use    Smoking status: Former     Current packs/day: 0.00     Average packs/day: 0.5 packs/day for 9.0 years (4.5 ttl pk-yrs)     Types: Cigarettes     Start date: 1986     Quit date: 1995     Years since quittin.0     Passive exposure: Past    Smokeless tobacco: Never    Tobacco comments:     0.5 ppd, smoked 6-10 years   Vaping Use    Vaping status: Never Used   Substance and Sexual Activity    Alcohol use: Yes     Alcohol/week: 2.0 standard drinks of alcohol     Types: 2 Cans of beer per week     Comment: with meal    Drug use: Never    Sexual activity: Defer        I reviewed the patient's chief complaint, history of present illness, review of systems, past medical history, surgical history, family history, social history, medications, and allergy list.     Review of Systems     Constitutional: Denies fevers, chills, weight loss  Cardiovascular: Denies chest pain, shortness of breath  Skin: Denies rashes, acute skin changes  Neurologic: Denies headache, loss of consciousness        Vital Signs:   /76   Pulse 72   Ht 167.6 cm (66\")   Wt 88.5 kg (195 lb)   SpO2 " 96%   BMI 31.47 kg/m²          Physical Exam  General: Alert. No acute distress    Ortho Exam        Left upper Extremity: 180 degrees active elevation. External rotation to 65 degrees.  Tenderness to palpation over AC joint line.  Internal rotation to mid lumbar.   Demonstrates intact active elbow ROM. Demonstrates intact active wrist ROM. Sensation intact. Palpable radial pulse. Neurovascularly intact.        Procedures      Imaging Results (Most Recent)       None             Result Review :             Assessment and Plan     Diagnoses and all orders for this visit:    1. Left shoulder pain, unspecified chronicity (Primary)    2. Shoulder tendonitis, left    3. Impingement syndrome of left shoulder        Discussed the treatment plan with the patient.     Discussed the risks and benefits of conservative measures. The patient expressed understanding and wished to proceed with a left shoulder steroid injection.  He tolerated the injection well.      Discussed the treatment options with the patient, operative vs non-operative. Discussed left shoulder arthroscopy if pain persists.     We discussed the risks, benefits and alternatives of injections. Patient was informed of possible adverse effects including but not limited to bleeding, damage to nerve, tendon or artery, increased blood sugar and increased blood pressure. Discussed possibility of a reaction from the injection.  Discussed the possibility that the injection may not completely improve or remove the pain.  Discussed the risk of infection.  Discussed the possibility of worsening pain after the injection.     Prescribed medication.  Discussed all medications have side effects and contact our office, PCP or go to ER if symptoms arise.        Call or return if worsening symptoms.    Follow Up     PRN      Patient was given instructions and counseling regarding his condition or for health maintenance advice. Please see specific information pulled into the AVS  if appropriate.     Scribed for Gael Novak MD by Noris Edwards MA.  01/23/25   08:48 EST    I have personally performed the services described in this document as scribed by the above individual and it is both accurate and complete. Gael Novak MD 01/23/25

## 2025-03-14 ENCOUNTER — TRANSCRIBE ORDERS (OUTPATIENT)
Dept: LAB | Facility: HOSPITAL | Age: 65
End: 2025-03-14
Payer: OTHER GOVERNMENT

## 2025-03-14 ENCOUNTER — LAB (OUTPATIENT)
Dept: LAB | Facility: HOSPITAL | Age: 65
End: 2025-03-14
Payer: MEDICARE

## 2025-03-14 DIAGNOSIS — I10 HYPERTENSION, UNSPECIFIED TYPE: ICD-10-CM

## 2025-03-14 DIAGNOSIS — E11.65 TYPE 2 DIABETES MELLITUS WITH HYPERGLYCEMIA, UNSPECIFIED WHETHER LONG TERM INSULIN USE: ICD-10-CM

## 2025-03-14 DIAGNOSIS — E11.65 TYPE 2 DIABETES MELLITUS WITH HYPERGLYCEMIA, UNSPECIFIED WHETHER LONG TERM INSULIN USE: Primary | ICD-10-CM

## 2025-03-14 DIAGNOSIS — E78.5 DYSLIPIDEMIA: ICD-10-CM

## 2025-03-14 LAB
ALBUMIN SERPL-MCNC: 4.4 G/DL (ref 3.5–5.2)
ALBUMIN/GLOB SERPL: 1.4 G/DL
ALP SERPL-CCNC: 50 U/L (ref 39–117)
ALT SERPL W P-5'-P-CCNC: 27 U/L (ref 1–41)
ANION GAP SERPL CALCULATED.3IONS-SCNC: 11.8 MMOL/L (ref 5–15)
AST SERPL-CCNC: 25 U/L (ref 1–40)
BILIRUB SERPL-MCNC: 0.7 MG/DL (ref 0–1.2)
BUN SERPL-MCNC: 12 MG/DL (ref 8–23)
BUN/CREAT SERPL: 12.2 (ref 7–25)
CALCIUM SPEC-SCNC: 8.9 MG/DL (ref 8.6–10.5)
CHLORIDE SERPL-SCNC: 103 MMOL/L (ref 98–107)
CO2 SERPL-SCNC: 24.2 MMOL/L (ref 22–29)
CREAT SERPL-MCNC: 0.98 MG/DL (ref 0.76–1.27)
EGFRCR SERPLBLD CKD-EPI 2021: 84.5 ML/MIN/1.73
GLOBULIN UR ELPH-MCNC: 3.1 GM/DL
GLUCOSE SERPL-MCNC: 120 MG/DL (ref 65–99)
HBA1C MFR BLD: 7.6 % (ref 4.8–5.6)
POTASSIUM SERPL-SCNC: 4.6 MMOL/L (ref 3.5–5.2)
PROT SERPL-MCNC: 7.5 G/DL (ref 6–8.5)
SODIUM SERPL-SCNC: 139 MMOL/L (ref 136–145)

## 2025-03-14 PROCEDURE — 80053 COMPREHEN METABOLIC PANEL: CPT

## 2025-03-14 PROCEDURE — 83036 HEMOGLOBIN GLYCOSYLATED A1C: CPT

## 2025-03-14 PROCEDURE — 36415 COLL VENOUS BLD VENIPUNCTURE: CPT

## 2025-04-22 ENCOUNTER — TELEPHONE (OUTPATIENT)
Dept: ORTHOPEDIC SURGERY | Facility: CLINIC | Age: 65
End: 2025-04-22
Payer: OTHER GOVERNMENT

## 2025-04-22 NOTE — TELEPHONE ENCOUNTER
[facility] OUT OF OFFICE ON 4/29. LVM FOR PT TO CALL BACK AND R/S. OK FOR HUB TO SCHEDULE NEXT AVAILABLE ON A TUES, THURS, OR FRIDAY MORNING.

## 2025-04-22 NOTE — TELEPHONE ENCOUNTER
PATIENT RETD CALL; WOULD LIKE A CALL BACK FROM OFFICE - DOES NOT WANT TO SPEAK WITH HUB    UNABLE TO WARM TRANSFER

## 2025-05-13 ENCOUNTER — OFFICE VISIT (OUTPATIENT)
Dept: ORTHOPEDIC SURGERY | Facility: CLINIC | Age: 65
End: 2025-05-13
Payer: MEDICARE

## 2025-05-13 VITALS
BODY MASS INDEX: 31.34 KG/M2 | HEIGHT: 66 IN | HEART RATE: 77 BPM | OXYGEN SATURATION: 94 % | SYSTOLIC BLOOD PRESSURE: 170 MMHG | DIASTOLIC BLOOD PRESSURE: 84 MMHG | WEIGHT: 195 LBS

## 2025-05-13 DIAGNOSIS — M77.8 SHOULDER TENDONITIS, LEFT: ICD-10-CM

## 2025-05-13 DIAGNOSIS — M19.012 OSTEOARTHRITIS OF LEFT SHOULDER, UNSPECIFIED OSTEOARTHRITIS TYPE: Primary | ICD-10-CM

## 2025-05-13 DIAGNOSIS — M75.42 IMPINGEMENT SYNDROME OF LEFT SHOULDER: ICD-10-CM

## 2025-05-13 DIAGNOSIS — M25.512 LEFT SHOULDER PAIN, UNSPECIFIED CHRONICITY: ICD-10-CM

## 2025-05-13 RX ORDER — TRIAMCINOLONE ACETONIDE 40 MG/ML
40 INJECTION, SUSPENSION INTRA-ARTICULAR; INTRAMUSCULAR
Status: COMPLETED | OUTPATIENT
Start: 2025-05-13 | End: 2025-05-13

## 2025-05-13 RX ORDER — LIDOCAINE HYDROCHLORIDE 10 MG/ML
5 INJECTION, SOLUTION INFILTRATION; PERINEURAL
Status: COMPLETED | OUTPATIENT
Start: 2025-05-13 | End: 2025-05-13

## 2025-05-13 RX ADMIN — TRIAMCINOLONE ACETONIDE 40 MG: 40 INJECTION, SUSPENSION INTRA-ARTICULAR; INTRAMUSCULAR at 09:01

## 2025-05-13 RX ADMIN — LIDOCAINE HYDROCHLORIDE 5 ML: 10 INJECTION, SOLUTION INFILTRATION; PERINEURAL at 09:01

## 2025-05-13 NOTE — PROGRESS NOTES
"  Chief Complaint  Follow-up of the Left Shoulder    Subjective      Osman Holman Jr. presents to [facility]     History of Present Illness  The patient is a 65-year-old male here to follow-up on his left shoulder.  He was last seen in office on 10/18/2024 for left shoulder pain.  Patient was seen previously by Dr. Novak and has received several injections into the shoulder.  Patient has had an MRI in the past on 7/9/2024.  Patient has attended physical therapy in the past.    Discomfort in the left shoulder persists, attributed to a fall on ice that occurred some time ago. No recent injuries or falls have been reported. He is considering a consultation with Dr. Novak for potential surgical intervention before the end of the summer. Injections administered by Dr. Novak provide temporary relief for approximately 2 months. He expresses a preference to avoid an MRI due to claustrophobia. The ability to lift objects and reach upwards is retained, but pain is experienced during extension movements.  He is requesting a repeat left shoulder steroid injection today in office.    He had heart valve surgery in 2024 and cataract surgery in 2025. No problems have been reported with the heart valve.      Allergies   Allergen Reactions    Penicillins Itching     PT REPORTED CAN TAKE KEFLEX NO REACTIONS     Penicillin G Rash       Objective     Vital Signs:   Vitals:    05/13/25 0853   BP: 170/84   BP Location: Left arm   Patient Position: Sitting   Cuff Size: Large Adult   Pulse: 77   SpO2: 94%   Weight: 88.5 kg (195 lb)   Height: 167.6 cm (65.98\")     Body mass index is 31.49 kg/m².    I reviewed the patient's chief complaint, history of present illness, review of systems, past medical history, surgical history, family history, social history, medications, and allergy list.     Ortho Exam    General: Alert. No acute distress.  Left Upper Extremity: tender to palpation over AC joint. No skin " discoloration, atrophy, or swelling. 180 degrees active elevation. External rotation to 45 degrees. Internal rotation to back pocket.   Demonstrates intact active elbow ROM. Demonstrates intact active wrist ROM. Sensation intact. Palpable radial pulse. Neurovascularly intact.          Large Joint: L subacromial bursa  Date/Time: 5/13/2025 9:01 AM  Consent given by: patient  Site marked: site marked  Timeout: Immediately prior to procedure a time out was called to verify the correct patient, procedure, equipment, support staff and site/side marked as required   Supporting Documentation  Indications: pain   Procedure Details  Location: shoulder - L subacromial bursa  Preparation: Patient was prepped and draped in the usual sterile fashion  Needle gauge: 21 G.  Medications administered: 40 mg triamcinolone acetonide 40 MG/ML; 5 mL lidocaine 1 %  Patient tolerance: patient tolerated the procedure well with no immediate complications      This injection documentation was Scribed for OCTAVIO Dawkins by Kaylin Nielsen MA.  05/13/25   09:05 EDT        MRI Shoulder Left Without Contrast  Narrative: MRI SHOULDER LEFT WO CONTRAST    Date of Exam: 7/9/2024 10:44 AM EDT    Indication: Left Shoulder Pain.     Comparison: Shoulder radiograph 1/18/2024    Technique:  Routine multiplanar/multisequence images of the left shoulder were obtained without contrast administration.      Findings:  Rotator cuff:  Supraspinatus tendon: Moderate distal tendinosis. No significant muscle atrophy.  Infraspinatus tendon: Mild distal tendinosis. Low signal intensity focus at the infraspinatus insertion site correspond to calcification seen on radiograph. Measures about 4 mm. Small calcific deposit. No significant muscle atrophy.  Subscapularis tendon: No tendon abnormality. No significant muscle atrophy.  Teres minor tendon: No tendon abnormality. No significant muscle atrophy.    Glenohumeral joint:  Humeral head is centrally located  within the glenoid. Physiologic joint fluid is present. There is mild cartilage loss. The inferior joint capsule is normal thickness and normal signal intensity.    Labrum:  There is tearing of the labrum throughout. Multiloculated paralabral cyst are seen superiorly and anteriorly measures 1.6 cm.    Biceps tendon:  Long head of the biceps tendon is appropriately located within the bicipital groove. It has increased signal intensity of its intra-articular portion.. It inserts appropriately on the labrum.     Acromioclavicular Joint:  Moderate degenerative change. No abnormal bone marrow edema. No os acromiale. No significant lateral downsloping of the acromion.    Bone:  No fractures or aggressive osseous lesions. Bone marrow signal intensity is normal.    Miscellaneous:  No significant subacromial subdeltoid fluid. No pathologically enlarged axillary lymph nodes. Deltoid muscle has normal size and signal intensity.  Impression: 1.Mild glenohumeral joint Tritus with diffuse labral tearing and anterior superior paralabral cyst.  2.Moderate tendinosis of the supraspinatus tendon. Mild tendinosis of the infraspinatus tendon with small focus of calcific tendinitis at its insertion site.  3.Moderate arthritis acromioclavicular joint.  4.Tendinosis of the proximal long head biceps tendon.    Electronically Signed: Myrna Smith MD    7/10/2024 1:41 PM EDT    Workstation ID: [id number]              Assessment and Plan   Diagnoses and all orders for this visit:    1. Osteoarthritis of left shoulder, unspecified osteoarthritis type (Primary)    2. Left shoulder pain, unspecified chronicity    3. Shoulder tendonitis, left    4. Impingement syndrome of left shoulder    Other orders  -     Large Joint: L subacromial bursa         Osman Holman Jr. presents today to [facility] for the follow up of their left shoulder. They have left shoulder pain and osteoarthritis and rotator cuff tendinitis that we have been treating  conservatively with activity modification, physical therapy and intermittent steroid injections. Patient elected to proceed with repeat left shoulder steroid injections. Patient is previously aware of the risks, benefits and alternatives of injections. Patient was informed of possible adverse effects including but not limited to bleeding, damage to nerve, tendon or artery, increased blood sugar and increased blood pressure. Discussed possibility of a reaction from the injection.  Discussed the possibility that the injection may not completely improve or remove the pain.  Discussed the risk of infection.  Discussed the possibility of worsening pain after the injection.  Informed consent obtained.  Time out was performed. Chlorhexidine was swabbed at injection site per typical technique. Needle injected into bursa, aspiration was performed and then left shoulder steroid slowly injected into joint space, fluid was free flowing. Needle was removed, band-aid placed to injection site.  Patient tolerated injection well with no complications. Additional information was provided on at checkout.     Follow up as needed  Eligible for repeat left injection(s) on/after August 14.            Tobacco Use: Medium Risk (5/13/2025)    Patient History     Smoking Tobacco Use: Former     Smokeless Tobacco Use: Never     Passive Exposure: Past     Patient reports they have a history of tobacco use; encouraged continued tobacco cessation for further health benefits.            Follow Up   Return if symptoms worsen or fail to improve.  There are no Patient Instructions on file for this visit.    Patient was given instructions and counseling regarding his condition or for health maintenance advice. Please see specific information pulled into the AVS if appropriate.     Patient or patient representative verbalized consent for the use of Ambient Listening during the visit with  OCTAVIO Dawkins for chart documentation. 5/13/2025  09:32  EDT    Dictated Utilizing Dragon Dictation. Please note that portions of this note were completed with a voice recognition program. Part of this note may be an electronic transcription/translation of spoken language to printed text using the Dragon Dictation System.

## 2025-07-09 ENCOUNTER — HOSPITAL ENCOUNTER (OUTPATIENT)
Facility: HOSPITAL | Age: 65
Discharge: HOME | End: 2025-07-09
Admitting: INTERNAL MEDICINE
Payer: OTHER GOVERNMENT

## 2025-07-09 DIAGNOSIS — I35.0 AORTIC STENOSIS, SEVERE: ICD-10-CM

## 2025-07-09 PROCEDURE — 93306 TTE W/DOPPLER COMPLETE: CPT

## 2025-07-11 ENCOUNTER — RESULTS FOLLOW-UP (OUTPATIENT)
Dept: CARDIOLOGY | Facility: CLINIC | Age: 65
End: 2025-07-11
Payer: OTHER GOVERNMENT

## 2025-07-11 LAB
AORTIC DIMENSIONLESS INDEX: 0.46 (DI)
ASCENDING AORTA: 3.5 CM
AV MEAN PRESS GRAD SYS DOP V1V2: 18.6 MMHG
AV VMAX SYS DOP: 287 CM/SEC
BH CV ECHO MEAS - 2D AUTO EF SIEMENS: 61.5 %
BH CV ECHO MEAS - ACS: 1.82 CM
BH CV ECHO MEAS - AO MAX PG: 33 MMHG
BH CV ECHO MEAS - AO ROOT DIAM: 3.3 CM
BH CV ECHO MEAS - AO V2 VTI: 53.2 CM
BH CV ECHO MEAS - AVA(I,D): 1.45 CM2
BH CV ECHO MEAS - IVS/LVPW: 0.73 CM
BH CV ECHO MEAS - IVSD: 1.1 CM
BH CV ECHO MEAS - LA DIMENSION: 3.7 CM
BH CV ECHO MEAS - LAT PEAK E' VEL: 11.9 CM/SEC
BH CV ECHO MEAS - LV MAX PG: 5.1 MMHG
BH CV ECHO MEAS - LV MEAN PG: 2.6 MMHG
BH CV ECHO MEAS - LV V1 MAX: 113.3 CM/SEC
BH CV ECHO MEAS - LV V1 VTI: 24.6 CM
BH CV ECHO MEAS - LVIDD: 4.6 CM
BH CV ECHO MEAS - LVIDS: 3.1 CM
BH CV ECHO MEAS - LVOT AREA: 3.1 CM2
BH CV ECHO MEAS - LVOT DIAM: 2 CM
BH CV ECHO MEAS - LVPWD: 1.5 CM
BH CV ECHO MEAS - MED PEAK E' VEL: 9 CM/SEC
BH CV ECHO MEAS - MV A MAX VEL: 74 CM/SEC
BH CV ECHO MEAS - MV E MAX VEL: 70.1 CM/SEC
BH CV ECHO MEAS - MV E/A: 0.95
BH CV ECHO MEAS - MV MAX PG: 4.3 MMHG
BH CV ECHO MEAS - MV MEAN PG: 1.4 MMHG
BH CV ECHO MEAS - MV V2 VTI: 25.4 CM
BH CV ECHO MEAS - MVA(VTI): 3 CM2
BH CV ECHO MEAS - RAP SYSTOLE: 5 MMHG
BH CV ECHO MEAS - RVDD: 3.9 CM
BH CV ECHO MEAS - RVSP: 30.3 MMHG
BH CV ECHO MEAS - SV(LVOT): 77.1 ML
BH CV ECHO MEAS - SVI(LVOT): 39.4 ML/M2
BH CV ECHO MEAS - TAPSE (>1.6): 2.23 CM
BH CV ECHO MEAS - TR MAX PG: 25.3 MMHG
BH CV ECHO MEAS - TR MAX VEL: 251.6 CM/SEC
BH CV ECHO MEASUREMENTS AVERAGE E/E' RATIO: 6.71
BH CV XLRA - TDI S': 13.9 CM/SEC
IVRT: 61 MS
LEFT ATRIUM VOLUME INDEX: 25.5 ML/M2
LV EF BIPLANE MOD: 61.5 %

## 2025-07-11 NOTE — TELEPHONE ENCOUNTER
Per Dr. Hirsch:  Echocardiogram shows normal heart muscle function and valve replacment in working the same as seen on previous echo no changes. Notify office if symptoms persist/worsen.    Spoke with patient, patient is aware and verbalized understanding.

## 2025-07-16 ENCOUNTER — OFFICE VISIT (OUTPATIENT)
Dept: CARDIOLOGY | Facility: CLINIC | Age: 65
End: 2025-07-16
Payer: OTHER GOVERNMENT

## 2025-07-16 VITALS
HEIGHT: 66 IN | SYSTOLIC BLOOD PRESSURE: 150 MMHG | WEIGHT: 192 LBS | HEART RATE: 72 BPM | DIASTOLIC BLOOD PRESSURE: 66 MMHG | BODY MASS INDEX: 30.86 KG/M2

## 2025-07-16 DIAGNOSIS — E78.5 DYSLIPIDEMIA: ICD-10-CM

## 2025-07-16 DIAGNOSIS — I10 ESSENTIAL HYPERTENSION: ICD-10-CM

## 2025-07-16 DIAGNOSIS — I35.0 AORTIC STENOSIS, SEVERE: Primary | ICD-10-CM

## 2025-07-16 PROCEDURE — 99214 OFFICE O/P EST MOD 30 MIN: CPT | Performed by: INTERNAL MEDICINE

## 2025-07-16 NOTE — ASSESSMENT & PLAN NOTE
Patient with previous TAVR no changing symptoms no significant murmur on exam continue chronic aspirin 81 daily

## 2025-07-16 NOTE — ASSESSMENT & PLAN NOTE
Pressure well-controlled at goal range at home log continue with amlodipine 5 mg once a day and lisinopril HCTZ 10/12.5 daily

## 2025-07-16 NOTE — PROGRESS NOTES
Chief Complaint  Follow-up (Aortic stenosis)    Subjective    Patient follows up today doing well denies any change in breathing ability no chest pain problems.  Blood pressure log at home shows well-controlled blood pressures  Past Medical History:   Diagnosis Date    Allergic     possible penicillin    Allergic rhinitis     Aortic stenosis     ASYPTOMATIC- SEE'S DR. PACK, DENIED CP/SOB    Aortic valve replaced 05/09/2024    TAVR    Arthritis     Carpal tunnel syndrome on both sides CURRENTLY    Cataract     Dermatitis     Diabetes     TYPE II, BG RUNS UNDER 200 IN AM    Diabetes mellitus type 2 with complications, uncontrolled 01/30/2019    ED (erectile dysfunction)     Essential hypertension     GERD (gastroesophageal reflux disease)     Heart murmur     ASYMPTOMATIC- SEE'S DR PACK, DENIED CP/SOB    Hyperlipemia     Hyperlipidemia     Hypertension     Hypogonadism in male 01/30/2019    Nose irritation 02/27/2020    Nostril sore 02/27/2020    Periarthritis of shoulder     Rotator cuff syndrome     Thyroid disorder     Vitamin D deficiency          Current Outpatient Medications:     amLODIPine (NORVASC) 5 MG tablet, Take 1 tablet by mouth Every Night., Disp: , Rfl:     aspirin 81 MG chewable tablet, Chew 1 tablet Daily. PT REPORTED TO STOP 1 WEEK PRIOR TO SURGERY, LAST DOSE 10/04/21per Dr. Copeland's instruction, Disp: , Rfl:     atorvastatin (LIPITOR) 40 MG tablet, Take 1 tablet by mouth Every Night., Disp: , Rfl:     cetirizine (zyrTEC) 10 MG tablet, Take 1 tablet by mouth Every Morning., Disp: , Rfl:     cholecalciferol (VITAMIN D3) 25 MCG (1000 UT) tablet, Take 1 tablet by mouth Daily., Disp: , Rfl:     clindamycin (CLEOCIN) 150 MG capsule, TAKE 4 CAPSULES BY MOUTH 1 HOUR PRIOR TO APPOINTMENT, Disp: , Rfl:     Coenzyme Q10 100 MG tablet, Take 1 tablet by mouth Daily., Disp: , Rfl:     cyclobenzaprine (FLEXERIL) 10 MG tablet, Take 1 tablet by mouth 3 (Three) Times a Day As Needed for Muscle Spasms., Disp: 90  tablet, Rfl: 1    diclofenac (VOLTAREN) 75 MG EC tablet, Take 1 tablet by mouth 2 (Two) Times a Day., Disp: 60 tablet, Rfl: 0    empagliflozin (JARDIANCE) 25 MG tablet tablet, Take 1 tablet by mouth Every Morning. Taking half tablet daily, Disp: , Rfl:     ezetimibe (ZETIA) 10 MG tablet, Take 1 tablet by mouth Daily., Disp: , Rfl:     FLUoxetine (PROzac) 20 MG capsule, Take 1 capsule by mouth Daily., Disp: , Rfl:     fluticasone (FLONASE) 50 MCG/ACT nasal spray, Administer 2 sprays into the nostril(s) as directed by provider Daily As Needed., Disp: , Rfl:     Fluticasone-Umeclidin-Vilant (Trelegy Ellipta) 200-62.5-25 MCG/ACT aerosol powder , Inhale 1 puff Daily., Disp: 3 each, Rfl: 3    glipizide (GLUCOTROL) 5 MG tablet, Take 1 tablet by mouth Daily. Patient reports taking before evening meal, Disp: , Rfl:     hydrocortisone 2.5 % cream, , Disp: , Rfl:     Ketoconazole 1 % shampoo, Apply  topically to the appropriate area as directed., Disp: , Rfl:     levothyroxine (SYNTHROID, LEVOTHROID) 50 MCG tablet, Take 1 tablet by mouth., Disp: , Rfl:     lisinopril-hydrochlorothiazide (PRINZIDE,ZESTORETIC) 10-12.5 MG per tablet, Take 1 tablet by mouth Every Morning. inst per anesthesia protocol, Disp: , Rfl:     metFORMIN ER (GLUCOPHAGE-XR) 500 MG 24 hr tablet, Take 2 tablets by mouth Daily., Disp: , Rfl:     omeprazole (priLOSEC) 20 MG capsule, Take 1 capsule by mouth Daily As Needed., Disp: , Rfl:     Semaglutide, 1 MG/DOSE, (Ozempic, 1 MG/DOSE,) 2 MG/1.5ML solution pen-injector, Inject 1 mg under the skin into the appropriate area as directed., Disp: , Rfl:     sildenafil (VIAGRA) 25 MG tablet, Take 1 tablet by mouth., Disp: , Rfl:     sodium chloride 0.65 % nasal spray, 4 (Four) Times a Day., Disp: , Rfl:     Testosterone Cypionate (DEPOTESTOTERONE CYPIONATE) 200 MG/ML injection, Inject 0.88 mL into the appropriate muscle as directed by prescriber Every 14 (Fourteen) Days., Disp: 6 mL, Rfl: 1    Medications Discontinued  "During This Encounter   Medication Reason    desonide (DESOWEN) 0.05 % ointment     mupirocin (BACTROBAN) 2 % ointment     traMADol (ULTRAM) 50 MG tablet     Diclofenac Sodium (VOLTAREN) 1 % gel gel     meloxicam (MOBIC) 15 MG tablet      Allergies   Allergen Reactions    Penicillins Itching     PT REPORTED CAN TAKE KEFLEX NO REACTIONS     Penicillin G Rash        Social History     Tobacco Use    Smoking status: Former     Current packs/day: 0.00     Average packs/day: 0.5 packs/day for 9.0 years (4.5 ttl pk-yrs)     Types: Cigarettes     Start date: 1986     Quit date: 1995     Years since quittin.5     Passive exposure: Past    Smokeless tobacco: Never    Tobacco comments:     0.5 ppd, smoked 6-10 years   Vaping Use    Vaping status: Never Used   Substance Use Topics    Alcohol use: Yes     Alcohol/week: 2.0 standard drinks of alcohol     Types: 2 Cans of beer per week     Comment: with meal    Drug use: Never       Family History   Problem Relation Age of Onset    Hypertension Mother     Arthritis Mother     Stroke Father     Diabetes Father     Hypertension Father     Hypertension Sister     Arthritis Sister     Hypertension Other     Diabetes Other     Malig Hyperthermia Neg Hx         Objective     /66   Pulse 72   Ht 167.6 cm (65.98\")   Wt 87.1 kg (192 lb)   BMI 31.00 kg/m²       Physical Exam    General Appearance:   no acute distress  Alert and oriented x3  HENT:   lips not cyanotic  Atraumatic  Neck:  No jvd   supple  Respiratory:  no respiratory distress  normal breath sounds  no rales  Cardiovascular:  Regular rate and rhythm  no S3, no S4   no murmur  no rub  Extremities  No cyanosis  lower extremity edema: none    Skin:   warm, dry  No rashes      Result Review :     proBNP   Date Value Ref Range Status   2024 <50 0 - 899 pg/mL Final     CMP          9/10/2024    08:47 3/14/2025    07:41   CMP   Glucose 137  120    BUN 16  12    Creatinine 0.89  0.98    EGFR 94.5  84.5  " "  Sodium 138  139    Potassium 4.4  4.6    Chloride 101  103    Calcium 9.2  8.9    Total Protein  7.5    Albumin  4.4    Globulin  3.1    Total Bilirubin  0.7    Alkaline Phosphatase  50    AST (SGOT)  25    ALT (SGPT)  27    Albumin/Globulin Ratio  1.4    BUN/Creatinine Ratio 18.0  12.2    Anion Gap 13.0  11.8         Lab Results   Component Value Date    TSH 3.690 07/18/2024      Lab Results   Component Value Date    FREET4 1.42 07/18/2024      No results found for: \"DDIMERQUANT\"  Magnesium   Date Value Ref Range Status   05/10/2024 2.5 (H) 1.9 - 2.4 mg/dL Final      No results found for: \"DIGOXIN\"   No results found for: \"TROPONINT\"          No results found for: \"POCTROP\"    Results for orders placed during the hospital encounter of 07/09/25    Adult Transthoracic Echo Complete W/ Cont if Necessary Per Protocol 07/11/2025  9:56 AM    Interpretation Summary    Left ventricular systolic function is normal. Calculated left ventricular EF = 61.5%    Left ventricular wall thickness is consistent with mild to moderate concentric hypertrophy more prominent on the lateral and posterior myocardium    Left ventricular diastolic function was normal.    Aortic valve mean pressure gradient is 19 mmHg.  Unchanged from prior echocardiogram    There is a TAVR valve present. The prosthetic aortic valve peak and mean gradients are elevated.    Estimated right ventricular systolic pressure from tricuspid regurgitation is normal (<35 mmHg).                 Diagnoses and all orders for this visit:    1. S/p TAVR (Primary)  Assessment & Plan:  Patient with previous TAVR no changing symptoms no significant murmur on exam continue chronic aspirin 81 daily      2. Essential hypertension  Assessment & Plan:  Pressure well-controlled at goal range at home log continue with amlodipine 5 mg once a day and lisinopril HCTZ 10/12.5 daily      3. Dyslipidemia  Assessment & Plan:  Patient with that LDL at goal continue on Zetia 10 mg daily and " atorvastatin 40 mg daily              Follow Up     Return in about 6 months (around 1/16/2026) for Follow with Orly Perez.          Patient was given instructions and counseling regarding his condition or for health maintenance advice. Please see specific information pulled into the AVS if appropriate.

## 2025-08-05 ENCOUNTER — OFFICE VISIT (OUTPATIENT)
Dept: ORTHOPEDIC SURGERY | Facility: CLINIC | Age: 65
End: 2025-08-05
Payer: OTHER GOVERNMENT

## 2025-08-05 ENCOUNTER — PREP FOR SURGERY (OUTPATIENT)
Dept: OTHER | Facility: HOSPITAL | Age: 65
End: 2025-08-05
Payer: OTHER GOVERNMENT

## 2025-08-05 VITALS
WEIGHT: 193 LBS | HEIGHT: 66 IN | DIASTOLIC BLOOD PRESSURE: 81 MMHG | HEART RATE: 83 BPM | OXYGEN SATURATION: 95 % | BODY MASS INDEX: 31.02 KG/M2 | SYSTOLIC BLOOD PRESSURE: 143 MMHG

## 2025-08-05 DIAGNOSIS — M77.8 SHOULDER TENDONITIS, LEFT: Primary | ICD-10-CM

## 2025-08-05 DIAGNOSIS — M19.012 PRIMARY OSTEOARTHRITIS OF LEFT SHOULDER: ICD-10-CM

## 2025-08-05 DIAGNOSIS — M75.42 IMPINGEMENT SYNDROME OF LEFT SHOULDER: ICD-10-CM

## 2025-08-07 RX ORDER — LIDOCAINE HYDROCHLORIDE 10 MG/ML
5 INJECTION, SOLUTION INFILTRATION; PERINEURAL
Status: COMPLETED | OUTPATIENT
Start: 2025-08-07 | End: 2025-08-07

## 2025-08-07 RX ORDER — TRIAMCINOLONE ACETONIDE 40 MG/ML
40 INJECTION, SUSPENSION INTRA-ARTICULAR; INTRAMUSCULAR
Status: COMPLETED | OUTPATIENT
Start: 2025-08-07 | End: 2025-08-07

## 2025-08-07 RX ADMIN — TRIAMCINOLONE ACETONIDE 40 MG: 40 INJECTION, SUSPENSION INTRA-ARTICULAR; INTRAMUSCULAR at 10:20

## 2025-08-07 RX ADMIN — LIDOCAINE HYDROCHLORIDE 5 ML: 10 INJECTION, SOLUTION INFILTRATION; PERINEURAL at 10:20

## 2025-08-08 ENCOUNTER — LAB (OUTPATIENT)
Dept: LAB | Facility: HOSPITAL | Age: 65
End: 2025-08-08
Payer: MEDICARE

## 2025-08-08 DIAGNOSIS — E29.1 HYPOGONADISM IN MALE: ICD-10-CM

## 2025-08-08 DIAGNOSIS — Z12.5 PROSTATE CANCER SCREENING: ICD-10-CM

## 2025-08-08 LAB
ALBUMIN SERPL-MCNC: 4.5 G/DL (ref 3.5–5.2)
ALBUMIN/GLOB SERPL: 1.6 G/DL
ALP SERPL-CCNC: 52 U/L (ref 39–117)
ALT SERPL W P-5'-P-CCNC: 15 U/L (ref 1–41)
ANION GAP SERPL CALCULATED.3IONS-SCNC: 11.3 MMOL/L (ref 5–15)
AST SERPL-CCNC: 11 U/L (ref 1–40)
BILIRUB SERPL-MCNC: 0.6 MG/DL (ref 0–1.2)
BUN SERPL-MCNC: 18 MG/DL (ref 8–23)
BUN/CREAT SERPL: 18.4 (ref 7–25)
CALCIUM SPEC-SCNC: 8.8 MG/DL (ref 8.6–10.5)
CHLORIDE SERPL-SCNC: 101 MMOL/L (ref 98–107)
CO2 SERPL-SCNC: 22.7 MMOL/L (ref 22–29)
CREAT SERPL-MCNC: 0.98 MG/DL (ref 0.76–1.27)
DEPRECATED RDW RBC AUTO: 49.5 FL (ref 37–54)
EGFRCR SERPLBLD CKD-EPI 2021: 84.5 ML/MIN/1.73
ERYTHROCYTE [DISTWIDTH] IN BLOOD BY AUTOMATED COUNT: 15.3 % (ref 12.3–15.4)
GLOBULIN UR ELPH-MCNC: 2.8 GM/DL
GLUCOSE SERPL-MCNC: 153 MG/DL (ref 65–99)
HCT VFR BLD AUTO: 48.5 % (ref 37.5–51)
HGB BLD-MCNC: 15.6 G/DL (ref 13–17.7)
MCH RBC QN AUTO: 28.2 PG (ref 26.6–33)
MCHC RBC AUTO-ENTMCNC: 32.2 G/DL (ref 31.5–35.7)
MCV RBC AUTO: 87.7 FL (ref 79–97)
PLATELET # BLD AUTO: 213 10*3/MM3 (ref 140–450)
PMV BLD AUTO: 10.5 FL (ref 6–12)
POTASSIUM SERPL-SCNC: 4.3 MMOL/L (ref 3.5–5.2)
PROT SERPL-MCNC: 7.3 G/DL (ref 6–8.5)
PSA SERPL-MCNC: 0.42 NG/ML (ref 0–4)
RBC # BLD AUTO: 5.53 10*6/MM3 (ref 4.14–5.8)
SODIUM SERPL-SCNC: 135 MMOL/L (ref 136–145)
TESTOST SERPL-MCNC: 996 NG/DL (ref 193–740)
WBC NRBC COR # BLD AUTO: 13.04 10*3/MM3 (ref 3.4–10.8)

## 2025-08-08 PROCEDURE — G0103 PSA SCREENING: HCPCS

## 2025-08-08 PROCEDURE — 84403 ASSAY OF TOTAL TESTOSTERONE: CPT

## 2025-08-08 PROCEDURE — 36415 COLL VENOUS BLD VENIPUNCTURE: CPT

## 2025-08-08 PROCEDURE — 85027 COMPLETE CBC AUTOMATED: CPT

## 2025-08-08 PROCEDURE — 80053 COMPREHEN METABOLIC PANEL: CPT

## 2025-08-15 ENCOUNTER — OFFICE VISIT (OUTPATIENT)
Dept: UROLOGY | Age: 65
End: 2025-08-15
Payer: MEDICARE

## 2025-08-15 VITALS — WEIGHT: 190.6 LBS | BODY MASS INDEX: 30.63 KG/M2 | HEIGHT: 66 IN

## 2025-08-15 DIAGNOSIS — E29.1 HYPOGONADISM IN MALE: Primary | ICD-10-CM

## 2025-08-22 ENCOUNTER — TELEPHONE (OUTPATIENT)
Dept: CARDIOLOGY | Facility: CLINIC | Age: 65
End: 2025-08-22
Payer: OTHER GOVERNMENT

## (undated) DEVICE — GLV SURG ULTRAFREE MAX LTX PF 8

## (undated) DEVICE — EXTREMITY-LF: Brand: MEDLINE INDUSTRIES, INC.

## (undated) DEVICE — UNDERCAST PADDING: Brand: DEROYAL

## (undated) DEVICE — INTENDED FOR TISSUE SEPARATION, AND OTHER PROCEDURES THAT REQUIRE A SHARP SURGICAL BLADE TO PUNCTURE OR CUT.: Brand: BARD-PARKER ® CARBON RIB-BACK BLADES

## (undated) DEVICE — 1010 S-DRAPE TOWEL DRAPE 10/BX: Brand: STERI-DRAPE™

## (undated) DEVICE — BLD OPTH BEAVER/MINIBLADE STR 180DEG DBL/BVL SS

## (undated) DEVICE — DRSNG GZ PETROLTM XEROFORM CURAD 1X8IN STRL

## (undated) DEVICE — BNDG ESMARK 4IN 12FT LF STRL BLU

## (undated) DEVICE — GLV SURG SENSICARE W/ALOE PF LF 7 STRL

## (undated) DEVICE — BNDG ELAS DELUXE REINF 10CM 5MTR STRL

## (undated) DEVICE — GAUZE,SPONGE,4"X4",16PLY,STRL,LF,10/TRAY: Brand: MEDLINE

## (undated) DEVICE — STERILE POLYISOPRENE POWDER-FREE SURGICAL GLOVES: Brand: PROTEXIS

## (undated) DEVICE — BNDG ELAS ECON W/CLIP 3IN 5YD LF STRL

## (undated) DEVICE — GLV SURG SENSICARE SLT PF LF 7 STRL

## (undated) DEVICE — SUT ETHLN 4/0 FS2 18IN 662H

## (undated) DEVICE — APPL CHLORAPREP HI/LITE 26ML ORNG